# Patient Record
(demographics unavailable — no encounter records)

---

## 2017-01-24 NOTE — CONSULT
Consult Detox [facility]


Reason for Current Admission/Consult: ALCOHOL WITHDRAWAL SX.?


Referred by:: RYAN BULLARD MD





- History


History of Present Illness: 


61 Y/O MAN WITH A LONG HX. OF ALCOHOLISM WAS BROUGHT TO ED BECAUSE OF CONFUSION 

AND AGITATION.


ACCORDING TO MEDICAL RECORD HE WAS ON RESTORIL BUT RAN OUT A FEW DAYS AGO. HE 

RECEIVED VALIUM 10MG 


IM 2X. DURING MY INTERVIEW WITH PT. HE WAS CALMER BUT ANXIOUS,HE INSISTS THAT 

HE HAS NOT BEEN DRINKING 


IN 3 1/2 YRS.





- History Source


History Provided By: Patient, Medical Record


Limitations to Obtaining History: No Limitations





- Alcohol/Substance Use


Hx Alcohol Use: No (X4 YEARS AGO STOPPED)





- Current Drug/Alcohol Use


  ** Alcohol


Route: Oral


Frequency: Daily


Amount used: BEER 4(6PACKS)


Age of first use: 18 (LAST USED 3 1/2 YRS. AGO)





- Past Medical History


Cardio/Vascular: Yes: HTN


Psych: Yes: Addictions





- Significant


Medical Findings: 


 Laboratory Last Values











WBC  9.3 K/mm3 (4.0-10.0)  D 17  22:20    


 


RBC  4.66 M/mm3 (4.00-5.60)   17  22:20    


 


Hgb  14.6 GM/dL (11.7-16.9)  D 17  22:20    


 


Hct  42.3 % (35.4-49)  D 17  22:20    


 


MCV  90.9 fl (80-96)   17  22:20    


 


MCHC  34.4 g/dl (32.0-35.9)   17  22:20    


 


RDW  13.1 % (11.9-15.9)   17  22:20    


 


Plt Count  184 K/MM3 (134-434)  D 17  22:20    


 


MPV  8.4 fl (7.5-11.1)   17  22:20    


 


Neutrophils %  79.5 % (42.8-82.8)  D 17  22:20    


 


Lymphocytes %  12.7 % (8-40)  D 17  22:20    


 


Monocytes %  7.3 % (3.8-10.2)   17  22:20    


 


Eosinophils %  0.1 % (0-4.5)  D 17  22:20    


 


Basophils %  0.4 % (0-2.0)   17  22:20    


 


Sodium  133 mmol/L (136-145)  L  17  22:20    


 


Potassium  4.6 mmol/L (3.5-5.1)   17  22:20    


 


Chloride  96 mmol/L ()  L D 17  22:20    


 


Carbon Dioxide  25 mmol/L (21-32)   17  22:20    


 


Anion Gap  12  (8-16)   17  22:20    


 


BUN  40 mg/dL (7-18)  H D 17  22:20    


 


Creatinine  1.6 mg/dL (0.7-1.3)  H D 17  22:20    


 


Creat Clearance w eGFR  44.31  (>60)   17  22:20    


 


POC Glucometer  311 UNITS (())   17  13:14    


 


Random Glucose  283 mg/dL ()  H D 17  22:20    


 


Calcium  9.4 mg/dL (8.5-10.1)   17  22:20    


 


Total Bilirubin  1.0 mg/dL (0.2-1.0)  D 17  22:20    


 


AST  41 U/L (15-37)  H D 17  22:20    


 


ALT  44 U/L (12-78)  D 17  22:20    


 


Alkaline Phosphatase  138 U/L ()  H D 17  22:20    


 


Ammonia  < 10.0 umol/L (11-32)  L  17  22:20    


 


Total Protein  9.2 g/dl (6.4-8.2)  H D 17  22:20    


 


Albumin  4.7 g/dl (3.4-5.0)  D 17  22:20    


 


Opiates Screen  Negative ng/ml (BHABDK=010)   17  00:05    


 


Methadone Screen  Negative ng/ml (EAFFQN=381)   17  00:05    


 


Barbiturate Screen  Negative ng/ml (EDRIYO=721)   17  00:05    


 


Phencyclidine Screen  Negative ng/ml (CUTOFF=25)   17  00:05    


 


Ur Amphetamines Screen  Negative ng/ml (MMMSFJ=373)   17  00:05    


 


MDMA (Ecstasy) Screen  Negative ng/ml (ERRHYS=067)   17  00:05    


 


Benzodiazepines Screen  Negative ng/ml (DHGNHL=339)   17  00:05    


 


Cocaine Screen  Negative ng/ml (GCGUCC=854)   17  00:05    


 


U Marijuana (THC) Screen  Negative ng/ml (CUTOFF=50)   17  00:05    


 


Alcohol, Quantitative  < 5.0 mg/dl (0-5)   17  22:20    








LABS NOTED





CIWA Score





- CIWA Score


Nausea/Vomitin-No Nausea/No Vomiting


Muscle Tremors: 3


Anxiety: 4-Mod. Anxious/Guarded


Agitation: 3


Paroxysmal Sweats: 3


Orientation: 0-Oriented


Tacttile Disturbances: 0-None


Auditory Disturbances: 0-None


Visual Disturbances: 0-None


Headache: 0-None Present


CIWA-Ar Total Score: 13





Assessment Plan





- Diagnosis


(1) Sedative, hypnotic or anxiolytic dependence with withdrawal, uncomplicated


Status: Acute








- Plan


Plan: 


GIVEN PRESENTING SYMPTOMS AND RESPONSE TO VALIUM, IT IS REASONABLE TO ASSUME 

THAT PT. WITHDRAWAL SX. IS FROM NOT TAKING RESTORIL(TEMAZEPAM) WHICH A 

BENZODIAZEPINE.





- Medication


Detox Regimen/Protocol: Librium

## 2017-01-24 NOTE — HP
Admitting History and Physical





- Admission


Chief Complaint: brought in for agitation


History of Present Illness: 


60 Y M DM, HTN, depresion, HX OF ETOH ABUSE.  HAS NOT had a drink in 3.5 YEARS 

BUT RECENT ADMIT CASTS SOME DOUBT. came to ER after an episode of agitation, 

pacing, decreased sleep. pt on ambien. ran out of it several days ago. denies 

drinking or using any other meds or drugs of abuse. in ed, confused, 

disoriented in time. hypertensive and tachycardic. no fever/ denies head ache. 

tremor of upper extremities, per patient he does not remember anything and was 

told by his son that he was pacing the house and was agitated so cleve  to ER


found to have elevated bun/cr 


started on librium protocol


History Source: Medical Record





- Past Medical History


Cardiovascular: Yes: HTN


Psych: Yes: Addictions





- Smoking History


Smoking history: Never smoked


Have you smoked in the past 12 months: No


Aproximately how many cigarettes per day: 0





- Alcohol/Substance Use


Hx Alcohol Use: No (X4 YEARS AGO STOPPED)





Home Medications





- Allergies


Allergies/Adverse Reactions: 


 Allergies











Allergy/AdvReac Type Severity Reaction Status Date / Time


 


No Known Allergies Allergy   Verified 01/23/17 22:06














- Home Medications


Home Medications: 


Ambulatory Orders





Ambien 2.5 mg PO HS 11/15/16 


Cholecalciferol (Vitamin D3) [Vitamin D3] 50,000 unit PO WEEKLY 11/15/16 


Empagliflozin [Jardiance] 25 mg PO DAILY 11/15/16 


Escitalopram Oxalate [Lexapro -] 10 mg PO DAILY 11/15/16 


Gabapentin 400 mg PO BID 11/15/16 


Linaclotide [Linzess] 145 mcg PO DAILY 11/15/16 


Lisinopril [Zestril] 30 mg PO DAILY 11/15/16 


Nifedipine [Adalat cc] 60 mg PO DAILY 11/15/16 


Oxycodone HCl/Acetaminophen [Percocet 5-325 mg Tablet] 1 tab PO Q4H 11/15/16 


Pantoprazole Sodium 40 mg PO DAILY 11/15/16 


Quetiapine Fumarate [Seroquel] 100 tab DAILY 11/15/16 


Temazepam [Restoril] 30 mg PO DAILY 11/15/16 


Vitamin B Comp W-C [Total B with C] 1 each PO DAILY 11/15/16 











Review of Systems





- Review of Systems


Constitutional: reports: No Symptoms


Eyes: reports: No Symptoms


Cardiovascular: reports: No Symptoms


Respiratory: reports: No Symptoms


Gastrointestinal: reports: No Symptoms





Physical Examination


Vital Signs: 


 Vital Signs











Temperature  98.0 F   01/23/17 22:07


 


Pulse Rate  92 H  01/24/17 10:24


 


Respiratory Rate  18   01/24/17 10:24


 


Blood Pressure  165/103   01/24/17 10:24


 


O2 Sat by Pulse Oximetry (%)  97   01/24/17 10:24














Problem List





- Problems


(1) EMILIE (acute kidney injury)


Assessment/Plan: 


iv hydration


hyponatermia sec to dehydration


Code(s): N17.9 - ACUTE KIDNEY FAILURE, UNSPECIFIED





(2) Alcoholic liver disease


Assessment/Plan: 


ultrasound of liver


Code(s): K70.9 - ALCOHOLIC LIVER DISEASE, UNSPECIFIED





(3) Diabetes


Assessment/Plan: 


bgm


hga1c


contineu meds


Code(s): E11.9 - TYPE 2 DIABETES MELLITUS WITHOUT COMPLICATIONS   Qualifiers: 


     Diabetes mellitus type: type 2





(4) Hypertension


Assessment/Plan: 


adalat and lisinoril


Code(s): I10 - ESSENTIAL (PRIMARY) HYPERTENSION   Qualifiers: 


   





(5) Withdrawal symptoms, alcohol


Assessment/Plan: 


librium protocol


atrivan as needed


Code(s): F10.239 - ALCOHOL DEPENDENCE WITH WITHDRAWAL, UNSPECIFIED   Qualifiers

: 


     Complication of substance-induced condition: uncomplicated     Qualified 

Code(s): F10.230 - Alcohol dependence with withdrawal, uncomplicated  





(6) Depression


Assessment/Plan: 


lexapro,restoril


Code(s): F32.9 - MAJOR DEPRESSIVE DISORDER, SINGLE EPISODE, UNSPECIFIED   





(7) Peripheral neuropathy


Assessment/Plan: 


neurontin


Code(s): G62.9 - POLYNEUROPATHY, UNSPECIFIED   Qualifiers:

## 2017-01-24 NOTE — CONSULT
Admitting History and Physical





- Primary Care Physician


PCP: Roopa Reeder





- Admission


History of Present Illness: 


Per EMR:


"Admission


Chief Complaint: brought in for agitation


History of Present Illness: 


60 Y M DM, HTN, depresion, HX OF ETOH ABUSE.  HAS NOT had a drink in 3.5 YEARS 

BUT RECENT ADMIT CASTS SOME DOUBT. came to ER after an episode of agitation, 

pacing, decreased sleep. pt on ambien. ran out of it several days ago. denies 

drinking or using any other meds or drugs of abuse. in ed, confused, 

disoriented in time. hypertensive and tachycardic. no fever/ denies head ache. 

tremor of upper extremities, per patient he does not remember anything and was 

told by his son that he was pacing the house and was agitated so cleve  to ER


found to have elevated bun/cr 


started on librium protocol"


History Source: Patient, Medical Record


Limitations to Obtaining History: No Limitations





- Past Medical History


Cardiovascular: Yes: HTN


Psych: Yes: Addictions





- Smoking History


Smoking history: Never smoked


Have you smoked in the past 12 months: No


Aproximately how many cigarettes per day: 0





- Alcohol/Substance Use


Hx Alcohol Use: No (X4 YEARS AGO STOPPED)





History





- Admission


Reason For Visit: ETOH WITHDRAWAL SYMPTOMS





- Diagnostics


CT Scan: Report Reviewed





- General


Mental Status: Alert and Oriented, Awake and Alert, Able to Follow Commands


Attention: Intact


Ability to Follow Directions: Good


Head/Neck Control: WFL





- Hearing


Hearing: Normal





Speech Evaluation





- Communication


Primary Language: Turkish


Secondary Language: ENGLISH (ESL)


Communication: Yes: Within Normal Limits, Language Barrier


Oral Expression Ability: Yes: No Impairment





- Speech Production


Able to Make Needs Known: Yes: WNL


Intelligibility: Yes: WNL (ESL)





- Speech Characteristics


Voice Loudness: Normal


Voice Pitch: Yes: Normal


Voice Phonatory-based Quality: Yes: Normal


Speech Pattern: Normal


Nasal Resonance: Normal


Articulation: Yes: Precise


Rate of Speech: Intact





- Language/Auditory Comprehension


Follows: Yes: 1 Stage Simple Commands





- Language/Verbal Expression


Able to Communicate Wants and Needs: Yes: WNL


Functional Communication Status: Yes: WNL


Attention: Yes: Intact





- Swallow Evaluation/Bedside Assessment


Current Nutritional Intake: Regular, Thin Liquids


Oral Secretions: Yes: WFL


Dentition: Yes: Missing Teeth


Facial Symmetry at Rest: Symmetrical


Facial Symmetry on Retraction: Symmetrical


Facial Movement: Controlled


Sensation: Normal


Against Resistance Opening: Normal


Against Resistance Closing: Normal


Pucker Lips: Normal


Smile: Normal


Lingual Movement: Normal


Lingual Speed of Movement: Normal


Lingual Movement Strgth Against Opposition: Normal


Lingual Movement Characteristics: Normal


Velopharyngeal Movement: Normal


Laryngeal Elevation: WFL


Laryngeal Movement: Able to Palpate


Rate of Intake: WFL


Bolus Size: WFL


Labial Seal: WFL


Chewing: WFL


Oral Prep Time: WFL


A-P Transit: WFL


Pocketing: None


Timing of Swallow: WFL


Coughing/Throat Clear: No


Change in Voice: No





Recommendations





- Speech Evaluation, Impression/Plan


Impression: Difficult at times to understand sec to English as a second 

language.  Swallowing overtly intact. Missing dentition





- Dysphagia Impressions/Plan


Swallowing Skills: WFL


Dysphagia Impressions: No Impairment, Ongoing Evaluation


*Silent aspiration: cannot be R/O at bedside





- Recommendations


Diet Consistency: Regular (soft. Missing dentition)


Medication Administration: Whole with water


Liquids: Thin Liquids

## 2017-01-24 NOTE — CONSULT
Consult - text type





- Consultation


Consultation Note: 


NEUROLOGY CONSULTATION is greatly appreciated:





This 59 yo RH man with h/o DM, HTN, GERD, depression and pain has extensive 

ETOH history but claims none in 3 1/2 years.


Meds include: Jardiance, lexapro, gabapentine (400 BID), linzess, lisinopril, 

nifedipine, pantoprazole, oxycodone, temazepam and zolpidem.


Apparently ran out of meds including temazepam, about 1 week ago.


Admitted with 3 days of sleeplessness, tachycardia, agitation, and confusion 

which notes suggest improved after Valium (10 mg IV).





CT of head (reviewed): normal


Labs: Elevated glucose, Cr/BUN





JEFFREY: No head trauma, No bruits, Cor: Reg, afebrile.


NEURO: Mild OMS. Repeats himself. Recalls 1 of 3 at 3, - Frontal release 

findings.


           Speech fluent


           CN's normal without nystagmus.


           Motor: +sustention tremor. No drift. Normal strength. Brisk 

reflexes. Toes downgoing.


           Coord: Minimal FTN dystaxia wilth tremor.


           Sensory:Normal


           Gait: Sl wide-based and shortened strides.





IMP: Non-focal exam sig for mild encephalopathy with tremors.


       Clinical picture c/w ETOH and/or benzodiazepine withdrawal.





SUGGEST: Encourage compliance with Librium detox protocol.


              Check B12, B1, Mg++, TSH


              Keep well-hydrated.


              Give thiamine 250 mg IVSS in 100 cc NS over 1 hour via pump q 8 

hrs x 3 days.


              Mobilize OOB to chair. [place] with TV, radio.





Thank you very much,


Felix Cao MD

## 2017-01-24 NOTE — PDOC
History of Present Illness





- General


History Source: Patient


Exam Limitations: Clinical Condition





- History of Present Illness


Initial Comments: 





01/24/17 00:28


60 Y M DM, HTN, depresion, HX OF ETOH ABUSE. STS HAS NOT DRENK FOR 3 YEARS BUT 

RECENT ADMIT CASTS SOME DOUBT. pte after an episode of agitation, pacing, 

decreased sleep. pt on ambien. ran out of it several days ago. denies drinking 

or using any other meds or drugs of abuse. in ed, confused, disoriented in 

time. hypertensive and tachycardic. no fever/ denies head ache. tremor of upper 

extremities





<Jeffy Henderson - Last Filed: 01/24/17 00:53>





<Sharlene Kaba - Last Filed: 01/24/17 01:46>





- General


Chief Complaint: Blood Pressure Problem


Stated Complaint: AMS


Time Seen by Provider: 01/23/17 22:10





Past History





- Past Medical History


Diabetes: Yes


Dialysis: Yes


HTN: Yes


Liver Disease: Yes


Seizures: Yes





- Immunization History


Immunization Up to Date: Yes





- Psycho/Social/Smoking Cessation Hx


Anxiety: No


Suicidal Ideation: No


Smoking Status: No


Smoking History: Unknown if ever smoked


Have you smoked in the past 12 months: No


Number of Cigarettes Smoked Daily: 0


Cigars Per Day: 0


Information on smoking cessation initiated: No


Hx Alcohol Use: No


Drug/Substance Use Hx: No


Substance Use Type: None


Hx Substance Use Treatment: No





<Jeffy Henderson - Last Filed: 01/24/17 00:53>





<Sharlene Kaba - Last Filed: 01/24/17 01:46>





- Past Medical History


Allergies/Adverse Reactions: 


 Allergies











Allergy/AdvReac Type Severity Reaction Status Date / Time


 


No Known Allergies Allergy   Verified 01/23/17 22:06











Home Medications: 


Ambulatory Orders





Ambien 2.5 mg PO HS 11/15/16 


Cholecalciferol (Vitamin D3) [Vitamin D3] 50,000 unit PO WEEKLY 11/15/16 


Empagliflozin [Jardiance] 25 mg PO DAILY 11/15/16 


Escitalopram Oxalate [Lexapro -] 10 mg PO DAILY 11/15/16 


Gabapentin 400 mg PO BID 11/15/16 


Linaclotide [Linzess] 145 mcg PO DAILY 11/15/16 


Lisinopril [Zestril] 30 mg PO DAILY 11/15/16 


Nifedipine [Adalat cc] 60 mg PO DAILY 11/15/16 


Oxycodone HCl/Acetaminophen [Percocet 5-325 mg Tablet] 1 tab PO Q4H 11/15/16 


Pantoprazole Sodium 40 mg PO DAILY 11/15/16 


Quetiapine Fumarate [Seroquel] 100 tab DAILY 11/15/16 


Temazepam [Restoril] 30 mg PO DAILY 11/15/16 


Vitamin B Comp W-C [Total B with C] 1 each PO DAILY 11/15/16 











**Review of Systems





- Review of Systems


Able to Perform ROS?: No





<Jeffy Henderson - Last Filed: 01/24/17 00:53>





*Physical Exam





- Vital Signs


 Last Vital Signs











Temp Pulse Resp BP Pulse Ox


 


 98.0 F   116 H  20   178/124   100 


 


 01/23/17 22:07  01/23/17 22:07  01/23/17 22:07  01/23/17 22:07  01/23/17 22:07














- Physical Exam


General Appearance: Yes: Nourished, Appropriately Dressed, Moderate Distress


HEENT: positive: Normal ENT Inspection, Other (tongue fasciculation)


Neck: positive: Supple.  negative: Tender


Respiratory/Chest: positive: Lungs Clear, Normal Breath Sounds.  negative: 

Respiratory Distress


Cardiovascular: positive: Regular Rhythm, Regular Rate, Tachycardia


Gastrointestinal/Abdominal: positive: Soft.  negative: Tender


Musculoskeletal: positive: Normal Inspection.  negative: Vertebral Tenderness


Extremity: positive: Normal Capillary Refill, Normal Range of Motion


Integumentary: positive: Normal Color.  negative: Rash


Neurologic: positive: CNs II-XII NML intact, Alert, Motor Strength 5/5, Confused

, Disoriented.  negative: Fully Oriented





<Jeffy Henderson - Last Filed: 01/24/17 00:53>





- Vital Signs


 Last Vital Signs











Temp Pulse Resp BP Pulse Ox


 


 98.0 F   116 H  20   178/124   100 


 


 01/23/17 22:07  01/23/17 22:07  01/23/17 22:07  01/23/17 22:07  01/23/17 22:07














<Sharlene Kaba - Last Filed: 01/24/17 01:46>





ED Treatment Course





- LABORATORY


CBC & Chemistry Diagram: 


 01/23/17 22:20





 01/23/17 22:20





- ADDITIONAL ORDERS


Additional order review: 


 Laboratory  Results











  01/23/17 01/23/17 01/23/17





  22:20 22:20 22:20


 


Sodium    133 L


 


Potassium    4.6


 


Chloride    96 L D


 


Carbon Dioxide    25


 


Anion Gap    12


 


BUN    40 H D


 


Creatinine    1.6 H D


 


Creat Clearance w eGFR    44.31


 


POC Glucometer   


 


Random Glucose    283 H D


 


Calcium    9.4


 


Total Bilirubin    1.0  D


 


AST    41 H D


 


ALT    44  D


 


Alkaline Phosphatase    138 H D


 


Ammonia   < 10.0 L 


 


Total Protein    9.2 H D


 


Albumin    4.7  D


 


Alcohol, Quantitative  < 5.0  














  01/23/17





  22:08


 


Sodium 


 


Potassium 


 


Chloride 


 


Carbon Dioxide 


 


Anion Gap 


 


BUN 


 


Creatinine 


 


Creat Clearance w eGFR 


 


POC Glucometer  283.39554


 


Random Glucose 


 


Calcium 


 


Total Bilirubin 


 


AST 


 


ALT 


 


Alkaline Phosphatase 


 


Ammonia 


 


Total Protein 


 


Albumin 


 


Alcohol, Quantitative 








 











  01/23/17 01/23/17





  22:20 22:08


 


RBC  4.66 


 


MCV  90.9 


 


MCHC  34.4 


 


RDW  13.1 


 


MPV  8.4 


 


Neutrophils %  79.5  D 


 


Lymphocytes %  12.7  D 


 


Monocytes %  7.3 


 


Eosinophils %  0.1  D 


 


Basophils %  0.4 


 


POC Glucometer   283.37474














- Medications


Given in the ED: 


ED Medications














Discontinued Medications














Generic Name Dose Route Start Last Admin





  Trade Name Freq  PRN Reason Stop Dose Admin


 


Diazepam  10 mg 01/23/17 22:23 01/23/17 22:31





  Valium Injection -  IVPUSH 01/23/17 22:24  10 mg





  ONCE ONE   Administration


 


Sodium Chloride  1,000 mls @ 1,000 mls/hr 01/23/17 22:14 01/23/17 22:30





  Normal Saline -  IV 01/23/17 23:13  1,000 mls/hr





  ASDIR STA   Administration














<Jeffy Henderson - Last Filed: 01/24/17 00:53>





- LABORATORY


CBC & Chemistry Diagram: 


 01/23/17 22:20





 01/23/17 22:20





- ADDITIONAL ORDERS


Additional order review: 


 Laboratory  Results











  01/24/17 01/23/17 01/23/17





  00:05 22:20 22:20


 


Sodium   


 


Potassium   


 


Chloride   


 


Carbon Dioxide   


 


Anion Gap   


 


BUN   


 


Creatinine   


 


Creat Clearance w eGFR   


 


POC Glucometer   


 


Random Glucose   


 


Calcium   


 


Total Bilirubin   


 


AST   


 


ALT   


 


Alkaline Phosphatase   


 


Ammonia    < 10.0 L


 


Total Protein   


 


Albumin   


 


Opiates Screen  Negative  


 


Methadone Screen  Negative  


 


Barbiturate Screen  Negative  


 


Phencyclidine Screen  Negative  


 


Ur Amphetamines Screen  Negative  


 


MDMA (Ecstasy) Screen  Negative  


 


Benzodiazepines Screen  Negative  


 


Cocaine Screen  Negative  


 


U Marijuana (THC) Screen  Negative  


 


Alcohol, Quantitative   < 5.0 














  01/23/17 01/23/17





  22:20 22:08


 


Sodium  133 L 


 


Potassium  4.6 


 


Chloride  96 L D 


 


Carbon Dioxide  25 


 


Anion Gap  12 


 


BUN  40 H D 


 


Creatinine  1.6 H D 


 


Creat Clearance w eGFR  44.31 


 


POC Glucometer   283.48148


 


Random Glucose  283 H D 


 


Calcium  9.4 


 


Total Bilirubin  1.0  D 


 


AST  41 H D 


 


ALT  44  D 


 


Alkaline Phosphatase  138 H D 


 


Ammonia  


 


Total Protein  9.2 H D 


 


Albumin  4.7  D 


 


Opiates Screen  


 


Methadone Screen  


 


Barbiturate Screen  


 


Phencyclidine Screen  


 


Ur Amphetamines Screen  


 


MDMA (Ecstasy) Screen  


 


Benzodiazepines Screen  


 


Cocaine Screen  


 


U Marijuana (THC) Screen  


 


Alcohol, Quantitative  








 











  01/23/17 01/23/17





  22:20 22:08


 


RBC  4.66 


 


MCV  90.9 


 


MCHC  34.4 


 


RDW  13.1 


 


MPV  8.4 


 


Neutrophils %  79.5  D 


 


Lymphocytes %  12.7  D 


 


Monocytes %  7.3 


 


Eosinophils %  0.1  D 


 


Basophils %  0.4 


 


POC Glucometer   283.93844














- RADIOLOGY


Radiograph Interpretation: 





01/24/17 01:45


EXAM: CT HEAD WITHOUT CONTRAST


Reviewed by Imaging on Call: 


IMPRESSION: 


No CT evidence of acute infarct, hemorrhage, or mass effect.








- Medications


Given in the ED: 


ED Medications














Discontinued Medications














Generic Name Dose Route Start Last Admin





  Trade Name Freq  PRN Reason Stop Dose Admin


 


Diazepam  10 mg 01/23/17 22:23 01/23/17 22:31





  Valium Injection -  IVPUSH 01/23/17 22:24  10 mg





  ONCE ONE   Administration


 


Diazepam  10 mg 01/24/17 00:00 01/24/17 00:17





  Valium Injection -  IVPUSH 01/24/17 00:01  10 mg





  ONCE ONE   Administration


 


Sodium Chloride  1,000 mls @ 1,000 mls/hr 01/23/17 22:14 01/23/17 22:30





  Normal Saline -  IV 01/23/17 23:13  1,000 mls/hr





  ASDIR STA   Administration














<Sharlene Kaba - Last Filed: 01/24/17 01:46>





Medical Decision Making





- Medical Decision Making





01/24/17 00:35


unclear trigger to his presentaton which suggest etoh/benzo withdrawal


could be explained by acute psychosis +/-ambien withdrawal


will cover w/ benzos/ivf/admit





<Jeffy Henderson - Last Filed: 01/24/17 00:53>





- Medical Decision Making


01/24/17 00:05


Paged Dr. Matteo Wallace covering for Dr. Marsha Hernandez (via answering service) at 

24:05, 24:46


Awaiting call back


Patient's case was discussed with Dr. Wallace at 24:50








<Sharlene Kaba - Last Filed: 01/24/17 01:46>





*DC/Admit/Observation/Transfer





- Discharge Dispostion


Admit: Yes





<Jeffy Henderson - Last Filed: 01/24/17 00:53>





<Sharlene Kaba - Last Filed: 01/24/17 01:46>


Diagnosis at time of Disposition: 


Withdrawal symptoms, alcohol


Qualifiers:


 Complication of substance-induced condition: uncomplicated Qualified Code(s): 

F10.230 - Alcohol dependence with withdrawal, uncomplicated





- Discharge Dispostion


Condition at time of disposition: Guarded





- Referrals


Referrals: 


STAFF,NOT ON [Primary Care Provider] -

## 2017-01-25 NOTE — EKG
Test Reason : 

Blood Pressure : ***/*** mmHG

Vent. Rate : 081 BPM     Atrial Rate : 081 BPM

   P-R Int : 164 ms          QRS Dur : 102 ms

    QT Int : 380 ms       P-R-T Axes : [phone] degrees

   QTc Int : 441 ms

 

NORMAL SINUS RHYTHM

NORMAL ECG

WHEN COMPARED WITH ECG OF 23-JAN-2017 22:09,

NO SIGNIFICANT CHANGE WAS FOUND

Confirmed by HIREN MARY MD ([id number]) on 1/25/2017 3:34:19 PM

 

Referred By: ROEL COOL           Confirmed By:HIREN MARY MD

## 2017-01-26 NOTE — PN
Progress Note, Physician





- Current Medication List


Current Medications: 


Active Medications





Acetaminophen (Tylenol -)  650 mg PO Q6H PRN


   PRN Reason: FEVER OR PAIN


Chlordiazepoxide HCl (Librium -)  25 mg PO Q4H PRN


   PRN Reason: WITHDRAWAL(CONT SUBST)


   Stop: 01/27/17 14:49


Chlordiazepoxide HCl (Librium -)  25 mg PO [id number] [facility]


   Stop: 01/26/17 11:01


   Last Admin: 01/26/17 05:23 Dose:  25 mg


Chlordiazepoxide HCl (Librium -)  15 mg PO [id number] [facility]


   Stop: 01/27/17 11:01


Escitalopram Oxalate (Lexapro -)  10 mg PO DAILY [facility]


   Last Admin: 01/25/17 10:28 Dose:  10 mg


Gabapentin (Neurontin -)  100 mg PO TID [facility]


   Last Admin: 01/26/17 05:23 Dose:  100 mg


Sodium Chloride (Normal Saline -)  1,000 mls @ 75 mls/hr IV ASDIR [facility]


   Last Admin: 01/25/17 11:24 Dose:  Not Given


Insulin Aspart (Novolog Vial Sliding Scale -)  1 vial SQ ACHS [facility]


   PRN Reason: Protocol


   Last Admin: 01/26/17 06:38 Dose:  Not Given


Insulin Detemir (Levemir Vial)  30 units SQ AM [facility]


   Last Admin: 01/26/17 07:41 Dose:  30 units


Lisinopril (Prinivil)  5 mg PO DAILY [facility]


   Last Admin: 01/25/17 10:28 Dose:  5 mg


Lorazepam (Ativan Injection -)  1 mg IM Q6H PRN


   PRN Reason: ANXIETY


Nifedipine (Procardia Xl -)  60 mg PO DAILY [facility]


   Last Admin: 01/25/17 10:32 Dose:  60 mg


Pantoprazole Sodium (Protonix -)  40 mg PO DAILY [facility]


   Last Admin: 01/25/17 10:28 Dose:  40 mg


Quetiapine Fumarate (Seroquel -)  100 mg PO [facility]


   Last Admin: 01/25/17 21:28 Dose:  100 mg


Sitagliptin Phosphate (Januvia -)  50 mg PO DAILY@0700 [facility]


   Last Admin: 01/25/17 06:05 Dose:  50 mg


Temazepam (Restoril -)  15 mg PO HS [facility]


   Last Admin: 01/25/17 21:29 Dose:  15 mg


Thiamine HCl (Vitamin B1 Injection -)  200 mg IVPB DAILY [facility]


   Last Admin: 01/25/17 10:32 Dose:  200 mg











- Objective


Vital Signs: 


 Vital Signs











Temperature  97.7 F   01/26/17 06:00


 


Pulse Rate  82   01/26/17 06:00


 


Respiratory Rate  18   01/26/17 06:00


 


Blood Pressure  143/88   01/26/17 06:00


 


O2 Sat by Pulse Oximetry (%)  97   01/25/17 22:00











Cardiovascular: Yes: Regular Rate and Rhythm


Respiratory: Yes: Regular, CTA Bilaterally


Gastrointestinal: Yes: Normal Bowel Sounds, Soft


Labs: 


 CBC, BMP





 01/26/17 05:35 











Assessment/Plan


- Problems


(1) EMILIE (acute kidney injury)


Assessment/Plan: 


iv hydration


hyponatermia sec to dehydration


us


Code(s): N17.9 - ACUTE KIDNEY FAILURE, UNSPECIFIED





(2) Alcoholic liver disease


Assessment/Plan: 


ultrasound of liver


Code(s): K70.9 - ALCOHOLIC LIVER DISEASE, UNSPECIFIED





(3) Diabetes


Assessment/Plan: 


bgm


hga1c


continue meds


endo


Code(s): E11.9 - TYPE 2 DIABETES MELLITUS WITHOUT COMPLICATIONS   Qualifiers: 


     Diabetes mellitus type: type 2





(4) Hypertension


Assessment/Plan: 


adalat and lisinoril


Code(s): I10 - ESSENTIAL (PRIMARY) HYPERTENSION   Qualifiers: 


   





(5) Withdrawal symptoms, alcohol


Assessment/Plan: 


librium protocol


Ativan as needed


detox and neuro consults noted


Code(s): F10.239 - ALCOHOL DEPENDENCE WITH WITHDRAWAL, UNSPECIFIED   Qualifiers

: 


     Complication of substance-induced condition: uncomplicated     Qualified 

Code(s): F10.230 - Alcohol dependence with withdrawal, uncomplicated  





(6) Depression


Assessment/Plan: 


lexapro,restoril


Code(s): F32.9 - MAJOR DEPRESSIVE DISORDER, SINGLE EPISODE, UNSPECIFIED   





(7) Peripheral neuropathy


Assessment/Plan: 


neurontin


Code(s): G62.9 - POLYNEUROPATHY, UNSPECIFIED   Qualifiers: 


   


(8) Torn Chordae --on echo


cardio

## 2017-01-26 NOTE — EKG
Test Reason : 

Blood Pressure : ***/*** mmHG

Vent. Rate : 116 BPM     Atrial Rate : 116 BPM

   P-R Int : 158 ms          QRS Dur : 090 ms

    QT Int : 334 ms       P-R-T Axes : 065 037 053 degrees

   QTc Int : 464 ms

 

SINUS TACHYCARDIA

POSSIBLE LEFT ATRIAL ENLARGEMENT

BORDERLINE ECG

WHEN COMPARED WITH ECG OF 15-NOV-2016 16:13,

NONSPECIFIC T WAVE ABNORMALITY HAS REPLACED INVERTED T WAVES IN 

INFERIOR LEADS

Confirmed by RADHA RAMIREZ, DAGMAR ([id number]) on 1/26/2017 2:23:02 PM

 

Referred By:             Confirmed By:DAGMAR GARCIA MD

## 2017-01-26 NOTE — CONSULT
Consult


Consult Specialty:: Cardiology


Referred by:: Dr Hernandez


Reason for Consultation:: Abnormal echocardiogram





- History of Present Illness


Chief Complaint: Agitation, insomnia, confusion


History of Present Illness: 


61 yo male, son-smoker, with hx of DM 2, HTN, depresion, insomnia, past alcohol 

abuse -. last drank 3 1/1 yo, here with episode of agitation, confusion , 

insomnia


Patient has been on ambien and says he ran out 3 days ago -. had had been 

taking extra pills sometimes.


He was place don withdrawal protocol (? etoh vs benzo withdrawal) -. seen by 

neuro who thought him to be mildly encephalopathic





Patient has no cardiac history -. he denies hx of MI, CHF or CP syndrome.  he 

walks an hour daily an dtakes stairs in his house daily, w/o limitations.


He has no HOOD, PND or orthopnea.





He had an echo -. read as redundant or torn chordae; hence this consultation





- History Source


History Provided By: Patient





- Past Medical History


Cardio/Vascular: Yes: HTN


Psych: Yes: Addictions


Endocrine: Yes: Diabetes Mellitus


Additional Medical History: falls -> neck pain





- Alcohol/Substance Use


Hx Alcohol Use: Yes (X4 YEARS AGO STOPPED)





- Smoking History


Smoking history: Never smoked


Have you smoked in the past 12 months: No


Aproximately how many cigarettes per day: 0





- Social History


Usual Living Arrangement: With Spouse (lives with wife in apartment in house 

with 20-24 steps to enter; previously independent in ADLs, ambulated without 

Assistive device)


Place of Birth: [place] ([place])





Home Medications





- Allergies


Allergies/Adverse Reactions: 


 Allergies











Allergy/AdvReac Type Severity Reaction Status Date / Time


 


No Known Allergies Allergy   Verified 01/23/17 22:06














- Home Medications


Home Medications: 


Ambulatory Orders





Ambien 2.5 mg PO HS 11/15/16 


Cholecalciferol (Vitamin D3) [Vitamin D3] 50,000 unit PO WEEKLY 11/15/16 


Empagliflozin [Jardiance] 25 mg PO DAILY 11/15/16 


Escitalopram Oxalate [Lexapro -] 10 mg PO DAILY 11/15/16 


Gabapentin 400 mg PO BID 11/15/16 


Linaclotide [Linzess] 145 mcg PO DAILY 11/15/16 


Lisinopril [Zestril] 30 mg PO DAILY 11/15/16 


Nifedipine [Adalat cc] 60 mg PO DAILY 11/15/16 


Oxycodone HCl/Acetaminophen [Percocet 5-325 mg Tablet] 1 tab PO Q4H 11/15/16 


Pantoprazole Sodium 40 mg PO DAILY 11/15/16 


Quetiapine Fumarate [Seroquel] 100 tab DAILY 11/15/16 


Temazepam [Restoril] 30 mg PO DAILY 11/15/16 


Vitamin B Comp W-C [Total B with C] 1 each PO DAILY 11/15/16 


Insulin (Levemir) [Levemir Vial] 100 units ACBK 01/24/17 











Family Disease History





- Family Disease History


Family History: Denies (premature CAD)





Review of Systems





- Review of Systems


Constitutional: reports: No Symptoms


Eyes: reports: No Symptoms


HENT: reports: No Symptoms


Neck: reports: Other (neck pain)


Cardiovascular: reports: No Symptoms


Respiratory: reports: No Symptoms


Gastrointestinal: reports: No Symptoms


Genitourinary: reports: No Symptoms


Musculoskeletal: reports: Back Pain


Neurological: reports: Tremors (mild)


Psychiatric: reports: Depression





Physical Exam


Vital Signs: 


 Vital Signs











Temperature  97.9 F   01/26/17 10:00


 


Pulse Rate  80   01/26/17 10:00


 


Respiratory Rate  18   01/26/17 10:00


 


Blood Pressure  135/83   01/26/17 10:00


 


O2 Sat by Pulse Oximetry (%)  97   01/26/17 10:00











Constitutional: Yes: No Distress


Eyes: Yes: Conjunctiva Clear


HENT: Yes: Atraumatic


Neck: Yes: Supple


Cardiovascular: Yes: Regular Rate and Rhythm.  No: Murmur


Respiratory: Yes: CTA Bilaterally


Gastrointestinal: Yes: Normal Bowel Sounds, Soft.  No: Tenderness


Extremities: Yes: Other (warm)


Edema: No


Peripheral Pulses WNL: Yes


Neurological: Yes: Alert, Oriented, Tremors (mild)


Psychiatric: Yes: Alert, Oriented


Labs: 


 CBC, BMP





 01/26/17 05:35 











Imaging





- Results


EKG: Report Reviewed, Image Reviewed (SR)


Other: Other (tele -> SR, artifacts  Echo -. Normal LV size and systolic 

function, mild DD, mild concentric LVH, Torn or redundant chordae with trace MR

, PPM in RV)





Assessment/Plan


61 yo male with the above history, here with ? etoh vs benzo withdrawal -. on 

precaution, found with mild encephalopathy


He is more alert now, oer nursing


He denies any cardiac history and has no cardiac complaints





He was mildly tachycardic on admission (ST) -. c/w presentation





Echo read as torn or redundant chordae, but only with trace MR -. no 

intervention or further evaluation warranted at this time.  He will need follow-

up however.


Also pacemaker leads reported-. likely artifact, as pt has no PPM





Rec:


No further cardiac evaluation


I will review echo and have report amended as necessary


Continue DM and BP meds


Check lipids if no recent ones -. should be on statin given DM, if no 

contraindications (LFTs normal now, though mildly elevated on admission)


May d/c tele -> HR has been controlled


Per IM/neuro





Thanks! Will see prn!

## 2017-01-27 NOTE — CONSULT
Consult


Consult Specialty:: endocrine/diabetes mellitus


Referred by:: dr.saba khoury


Reason for Consultation:: uncontrolled diabetes mellitus





- History of Present Illness


Chief Complaint: confused and restless


History of Present Illness: 


60 Y M DM, HTN, depresion, HX OF ETOH ABUSE.  HAS NOT had a drink in 3.5 YEARS 

BUT RECENT ADMIT CASTS SOME DOUBT. came to ER after an episode of agitation, 

pacing, decreased sleep. pt on ambien. ran out of it several days ago. denies 

drinking or using any other meds or drugs of abuse. in ed, confused, has 

elevated bs for past several days,unsure of taking insulin and doses,has 

episode sugar in 300mg/dl from poor diet and restriction incarbs,no 

hypoglycemia in past several months.





- History Source


History Provided By: Patient





- Past Medical History


Cardio/Vascular: Yes: HTN


Psych: Yes: Addictions


Endocrine: Yes: Diabetes Mellitus


Additional Medical History: falls -> neck pain





- Alcohol/Substance Use


Hx Alcohol Use: Yes (X4 YEARS AGO STOPPED)





- Smoking History


Smoking history: Never smoked


Have you smoked in the past 12 months: No


Aproximately how many cigarettes per day: 0





- Social History


Usual Living Arrangement: With Spouse (lives with wife in apartment in house 

with 20-24 steps to enter; previously independent in ADLs, ambulated without 

Assistive device)





Home Medications





- Allergies


Allergies/Adverse Reactions: 


 Allergies











Allergy/AdvReac Type Severity Reaction Status Date / Time


 


No Known Allergies Allergy   Verified 01/23/17 22:06














- Home Medications


Home Medications: 


Ambulatory Orders





Cholecalciferol (Vitamin D3) [Vitamin D3] 50,000 unit PO WEEKLY 11/15/16 


Empagliflozin [Jardiance] 25 mg PO DAILY 11/15/16 


Escitalopram Oxalate [Lexapro -] 10 mg PO DAILY 11/15/16 


Linaclotide [Linzess] 145 mcg PO DAILY 11/15/16 


Nifedipine [Adalat cc] 60 mg PO DAILY 11/15/16 


Pantoprazole Sodium 40 mg PO DAILY 11/15/16 


Quetiapine Fumarate [Seroquel] 100 tab DAILY 11/15/16 


Vitamin B Comp W-C [Total B with C -] 1 each PO DAILY 11/15/16 


Gabapentin [Neurontin -] 100 mg PO TID #90 capsule 01/27/17 


Insulin (Levemir) [Levemir Vial] 35 units SQ AM  ml 01/27/17 


Lisinopril [Prinivil] 5 mg PO DAILY #30 tablet 01/27/17 


Temazepam [Restoril -] 15 mg PO HS  capsule MDD 1 01/27/17 











Review of Systems





- Review of Systems


Constitutional: reports: Loss of Appetite


Eyes: reports: Recent Change in Vision


HENT: reports: No Symptoms


Neck: reports: No Symptoms


Cardiovascular: reports: No Symptoms


Respiratory: reports: No Symptoms


Gastrointestinal: reports: Bloating, Constipation


Genitourinary: reports: No Symptoms


Breasts: reports: No Symptoms Reported


Musculoskeletal: reports: Muscle Cramps, Muscle Weakness


Integumentary: reports: No Symptoms


Neurological: reports: Tremors, Unsteady Gait, Weakness





Physical Exam


Vital Signs: 


 Vital Signs











Temperature  97.8 F   01/27/17 16:25


 


Pulse Rate  69   01/27/17 16:25


 


Respiratory Rate  20   01/27/17 16:25


 


Blood Pressure  120/69   01/27/17 16:25


 


O2 Sat by Pulse Oximetry (%)  98   01/27/17 09:00











Constitutional: Yes: Anxious


Eyes: Yes: EOM Intact


HENT: Yes: Normocephalic


Neck: Yes: Trachea Midline


Cardiovascular: Yes: Regular Rate and Rhythm


Respiratory: Yes: CTA Bilaterally


Gastrointestinal: Yes: Normal Bowel Sounds


...Rectal Exam: Yes: Deferred


Renal/: Yes: WNL


Breast(s): Yes: WNL


Musculoskeletal: Yes: WNL


Extremities: Yes: WNL


Edema: No


Peripheral Pulses WNL: Yes


Neurological: Yes: WNL, Alert, Oriented


Labs: 


 CBC, BMP





 01/26/17 05:35 











Problem List





- Problems


(1) Alcoholic liver disease


Code(s): K70.9 - ALCOHOLIC LIVER DISEASE, UNSPECIFIED





(2) Diabetes


Code(s): E11.9 - TYPE 2 DIABETES MELLITUS WITHOUT COMPLICATIONS   Qualifiers: 


     Diabetes mellitus type: type 2





(3) Hypertension


Code(s): I10 - ESSENTIAL (PRIMARY) HYPERTENSION   Qualifiers: 


   





(4) Alcohol dependence with uncomplicated withdrawal


Code(s): F10.230 - ALCOHOL DEPENDENCE WITH WITHDRAWAL, UNCOMPLICATED








Assessment/Plan


Current Active Problems





Alcoholic liver disease (Acute) 


Diabetes (Acute) 


Hypertension (Acute) 


Syncope and collapse (Acute) 





 Laboratory Results - last 24 hr











  01/27/17 01/27/17 01/27/17





  06:30 13:18 17:58


 


POC Glucometer  196  272  260








diabetes mellitus uncontrolle\diabetic neuropathy


alcoholic sequella


ckd\


 Laboratory Tests











  01/25/17 01/26/17 01/26/17





  05:35 05:35 11:34


 


Sodium   138 


 


Potassium   4.1 


 


Chloride   104 


 


Carbon Dioxide   23 


 


Anion Gap   11 


 


POC Glucometer    286


 


Random Glucose   244 H D 


 


Hemoglobin A1c %  8.8 H D  








plan:


levemir 35 units am will need follow up as outpatient for titration insulin dose

\


continue novlog coverage achs scale

## 2017-01-27 NOTE — DS
Physical Examination


Vital Signs: 


 Vital Signs











Temperature  97.6 F   01/27/17 06:00


 


Pulse Rate  69   01/27/17 06:00


 


Respiratory Rate  20   01/27/17 06:00


 


Blood Pressure  112/65   01/27/17 06:00


 


O2 Sat by Pulse Oximetry (%)  97   01/26/17 22:00











Neck: Yes: Supple


Cardiovascular: Yes: Regular Rate and Rhythm, Murmur


Respiratory: Yes: Regular, CTA Bilaterally


Edema: No


Neurological: Yes: Alert, Oriented


Labs: 


 CBC, BMP





 01/26/17 05:35 











Discharge Summary


Reason For Visit: ETOH WITHDRAWAL SYMPTOMS


Current Active Problems





Alcohol dependence with uncomplicated withdrawal (Acute) 


Alcoholic liver disease (Acute) 


Diabetes (Acute) 


Hypertension (Acute) 


Sedative, hypnotic or anxiolytic dependence with withdrawal, uncomplicated (

Acute) 


Syncope and collapse (Acute) 


Withdrawal symptoms, alcohol (Acute) 








Hospital Course: 


60 Y M DM, HTN, depresion, HX OF ETOH ABUSE.  HAS NOT had a drink in 3.5 YEARS 

BUT RECENT ADMIT CASTS SOME DOUBT. came to ER after an episode of agitation, 

pacing, decreased sleep. pt on ambien. ran out of it several days ago. denies 

drinking or using any other meds or drugs of abuse. in ed, confused, 

disoriented in time. hypertensive and tachycardic. no fever/ denies head ache. 

tremor of upper extremities, per patient he does not remember anything and was 

told by his son that he was pacing the house and was agitated so cleve  to ER


found to have elevated bun/cr 


started on librium protocol


History Source: Medical Record





- Past Medical History


Cardiovascular: Yes: HTN


Psych: Yes: Addictions





- Smoking History


Smoking history: Never smoked


Have you smoked in the past 12 months: No


Aproximately how many cigarettes per day: 0





- Alcohol/Substance Use


Hx Alcohol Use: No (X4 YEARS AGO STOPPED)





- Problems


(1) EMILIE (acute kidney injury)


Assessment/Plan: 


IMPROVED


 Laboratory Tests











  01/25/17 01/26/17





  05:35 05:35


 


BUN  42 H  36 H


 


Creatinine  1.5 H  1.3











Code(s): N17.9 - ACUTE KIDNEY FAILURE, UNSPECIFIED





(2) Alcoholic liver disease


Assessment/Plan: 


ultrasound of liver


Code(s): K70.9 - ALCOHOLIC LIVER DISEASE, UNSPECIFIED





(3) Diabetes


Assessment/Plan: 


bgm


 Laboratory Tests











  01/26/17 01/26/17 01/26/17





  11:34 16:48 22:35


 


POC Glucometer  286  298  259














  01/27/17





  06:30


 


POC Glucometer  196








INCREASE LEVEMIR TO 35


hga1c


continue meds


endo


Code(s): E11.9 - TYPE 2 DIABETES MELLITUS WITHOUT COMPLICATIONS   Qualifiers: 


     Diabetes mellitus type: type 2





(4) Hypertension


Assessment/Plan: 


adalat and lisinoril


Code(s): I10 - ESSENTIAL (PRIMARY) HYPERTENSION   Qualifiers: 


   





(5) Withdrawal symptoms, alcohol


Assessment/Plan: 


librium protocol FINISHED


detox and neuro consults noted


Code(s): F10.239 - ALCOHOL DEPENDENCE WITH WITHDRAWAL, UNSPECIFIED   Qualifiers

: 


     Complication of substance-induced condition: uncomplicated     Qualified 

Code(s): F10.230 - Alcohol dependence with withdrawal, uncomplicated  





(6) Depression


Assessment/Plan: 


lexapro,restoril


Code(s): F32.9 - MAJOR DEPRESSIVE DISORDER, SINGLE EPISODE, UNSPECIFIED   





(7) Peripheral neuropathy


Assessment/Plan: 


neurontin


Code(s): G62.9 - POLYNEUROPATHY, UNSPECIFIED   Qualifiers: 


   


(8) Torn Chordae --on echo


cardio noted--outpatient follow up


Condition: Improved





- Instructions


Diet, Activity, Other Instructions: 








follow up with dr vang--cardiology follow up


Referrals: 


STAFF,NOT ON [Primary Care Provider] - 


Milena Vang MD [Staff Physician] - 


Disposition: HOME





- Home Medications


Comprehensive Discharge Medication List: 


Ambulatory Orders





Cholecalciferol (Vitamin D3) [Vitamin D3] 50,000 unit PO WEEKLY 11/15/16 


Empagliflozin [Jardiance] 25 mg PO DAILY 11/15/16 


Escitalopram Oxalate [Lexapro -] 10 mg PO DAILY 11/15/16 


Linaclotide [Linzess] 145 mcg PO DAILY 11/15/16 


Nifedipine [Adalat cc] 60 mg PO DAILY 11/15/16 


Pantoprazole Sodium 40 mg PO DAILY 11/15/16 


Quetiapine Fumarate [Seroquel] 100 tab DAILY 11/15/16 


Vitamin B Comp W-C [Total B with C -] 1 each PO DAILY 11/15/16 


Gabapentin [Neurontin -] 100 mg PO TID #90 capsule 01/27/17 


Insulin (Levemir) [Levemir Vial] 35 units SQ AM  ml 01/27/17 


Lisinopril [Prinivil] 5 mg PO DAILY #30 tablet 01/27/17 


Temazepam [Restoril -] 15 mg PO HS  capsule MDD 1 01/27/17

## 2017-02-04 NOTE — PDOC
History of Present Illness





- General


Chief Complaint: Urinary Problem


Stated Complaint: UNABLE TO PASS URINE


Time Seen by Provider: 02/04/17 15:34


History Source: Patient


Exam Limitations: No Limitations





- History of Present Illness


Travel History: No


Initial Comments: 


02/04/17 16:18


60-year-old male presents to the emergency department with complaints of 

urinary retention and difficulty over the past 3 days only dribbling when he 

tries to go. Patient denies hematuria, fever or chills but does complain of 

suprapubic pressure presently. Patient states history of BPH and is followed by 

Dr. Jose Manuel Tong . patient also states has not had a good bowel movement in 6 

days and complaining of generalized abdominal distention without decreased 

flatulence, nausea, or change in appetite patient states has had constipation 

before and has not taken anything for this episode.   





Timing/Duration: reports: constant, getting worse


Quality: reports: moderate, cramping


Abdominal Pain Onset Location: reports: suprapubic, generalized abdomen


Aggravating Factors: improves with: None


Alleviating Factors: improves with: None





Past History





- Past Medical History


Allergies/Adverse Reactions: 


 Allergies











Allergy/AdvReac Type Severity Reaction Status Date / Time


 


No Known Allergies Allergy   Verified 02/04/17 15:28











Home Medications: 


Ambulatory Orders





Cholecalciferol (Vitamin D3) [Vitamin D3] 50,000 unit PO WEEKLY 11/15/16 


Empagliflozin [Jardiance] 25 mg PO DAILY 11/15/16 


Escitalopram Oxalate [Lexapro -] 10 mg PO DAILY 11/15/16 


Linaclotide [Linzess] 145 mcg PO DAILY 11/15/16 


Nifedipine [Adalat cc] 60 mg PO DAILY 11/15/16 


Pantoprazole Sodium 40 mg PO DAILY 11/15/16 


Quetiapine Fumarate [Seroquel] 100 tab DAILY 11/15/16 


Vitamin B Comp W-C [Total B with C -] 1 each PO DAILY 11/15/16 


Gabapentin [Neurontin -] 100 mg PO TID #90 capsule 01/27/17 


Insulin (Levemir) [Levemir Vial] 35 units SQ AM  ml 01/27/17 


Lisinopril [Prinivil] 5 mg PO DAILY #30 tablet 01/27/17 


Temazepam [Restoril -] 15 mg PO HS  capsule MDD 1 01/27/17 


Polyethylene Glycol 3350 [Miralax (For Bowel Prep) -] 17 gm PO ONCE #1 bottle 02 /04/17 








Diabetes: Yes


HTN: Yes


Liver Disease: Yes


Psychiatric Problems: Yes


Seizures: Yes





- Immunization History


Immunization Up to Date: Yes





- Psycho/Social/Smoking Cessation Hx


Anxiety: No


Suicidal Ideation: No


Smoking Status: No


Smoking History: Never smoked


Have you smoked in the past 12 months: No


Number of Cigarettes Smoked Daily: 0


Cigars Per Day: 0


Hx Alcohol Use: Yes (IN RECOVERY)


Drug/Substance Use Hx: No


Substance Use Type: None


Hx Substance Use Treatment: No


Patient Lives Alone: No


Lives with/in: spouse/SO





**Review of Systems





- Review of Systems


Able to Perform ROS?: Yes


Constitutional: No: Symptoms Reported


HEENTM: No: Symptoms Reported


Respiratory: No: Symptoms reported


Cardiac (ROS): No: Symptoms Reported


ABD/GI: Yes: Constipated, Abdominal cramping


: Yes: See HPI


Musculoskeletal: No: Symptoms Reported


Integumentary: No: Symptoms Reported


Neurological: No: Symptoms reported


Endocrine: No: Symptoms Reported


Hematologic/Lymphatic: No: Symptoms Reported





*Physical Exam





- Vital Signs


 Last Vital Signs











Temp Pulse Resp BP Pulse Ox


 


 97.6 F   100 H  20   102/69   100 


 


 02/04/17 15:22  02/04/17 15:22  02/04/17 15:22  02/04/17 15:22  02/04/17 15:22














- Physical Exam


General Appearance: Yes: Nourished, Appropriately Dressed.  No: Apparent 

Distress


HEENT: negative: Pale Conjunctivae


Neck: positive: Supple


Respiratory/Chest: positive: Lungs Clear, Normal Breath Sounds, Respiratory 

Distress.  negative: Accessory Muscle Use


Cardiovascular: positive: Regular Rhythm, Regular Rate.  negative: Murmur


Gastrointestinal/Abdominal: positive: Normal Bowel Sounds, Soft, Tenderness (

generalized wiuprapubic distention).  negative: Guarding, Rebound


Male Genitalia: positive: normal genitalia


Musculoskeletal: negative: CVA Tenderness


Extremity: positive: Normal Capillary Refill.  negative: Pedal Edema


Integumentary: positive: Normal Color, Dry, Warm


Neurologic: positive: Motor Strength 5/5 (ambulatory)





ED Treatment Course





- LABORATORY


CBC & Chemistry Diagram: 


 02/04/17 15:54





 02/04/17 15:54





- RADIOLOGY


Radiology Studies Ordered: 














 Category Date Time Status


 


 KUB (KID UR & BLAD) [RAD] Stat Radiology  02/04/17 15:35 Ordered














Medical Decision Making





- Medical Decision Making


02/04/17 16:21


Patient with history of end-stage renal disease presents with urinary dribbling 

for the past 3 days associated now with constipation for the past 6 days. 

Patient has no other complaints at this time including fever, nausea or 

weakness.


Patient on exam did have suprapubic distention.


Velez catheter placed without difficulty and drained immediately 700 mL of 

clear yellow urine. Patient ordered for labs including a CBC, comp, urinalysis 

urine culture and ordered for a KUB to assess for abdominal obstruction or 

colonic dilatation.





02/04/17 17:14


 Laboratory Tests











  02/04/17 02/04/17 02/04/17





  15:54 15:54 15:54


 


WBC  6.1  D  


 


Hgb  12.7  D  


 


Hct  37.6  


 


Neutrophils %  57.8  D  


 


Sodium    139


 


Potassium    4.1


 


Chloride    103


 


Carbon Dioxide    26


 


Anion Gap    10


 


BUN    15  D


 


Creatinine    1.3


 


Creat Clearance w eGFR    56.31


 


Calcium    9.1


 


AST    19  D


 


ALT    29  D


 


Alkaline Phosphatase    133 H


 


Urine Glucose (UA)   3+ H 


 


Urine Nitrite   Negative 


 


Ur Leukocyte Esterase   Negative 














02/04/17 17:20


KUB shows retained stool throughout the colon without signs of obstruction with 

diffuse gas pattern predominantly in the sigmoid region. Patient ordered for 

MiraLAX and will be discharged home with a velez to leg bag. Pt to follow up 

with his urologist on Monday. Case also discussed with Dr. Jose Manuel Tong upon 

patient's arrival to the ED and agrees if labs are normal, patient may follow-

up. Patient with an additional 300 mL of clear yellow urine in the canister.





*DC/Admit/Observation/Transfer


Diagnosis at time of Disposition: 


 Urinary retention due to benign prostatic hyperplasia, Encounter for ureteral 

catheter placement





Constipation


Qualifiers:


 Constipation type: other constipation type Qualified Code(s): K59.09 - Other 

constipation





- Discharge Dispostion


Disposition: HOME


Condition at time of disposition: Improved





- Prescriptions


Prescriptions: 


Polyethylene Glycol 3350 [Miralax (For Bowel Prep) -] 17 gm PO ONCE #1 bottle





- Referrals


Referrals: 


Milena Vang MD [Primary Care Provider] - 


Jose Manuel Tong MD [Staff Physician] - 





- Patient Instructions


Printed Discharge Instructions:  DI for Constipation, DI for Urinary Retention 

in Men, How to Care for Your Velez Catheter -- Male


Additional Instructions: 


Please take MiraLAX as prescribed and may repeat the dose in one hour if no 

bowel movement is produced.


Please keep Velez catheter bag empty to avoid overfilling and follow-up with 

urologist on Monday. 


Return to ED if symptoms worsen

## 2017-02-04 NOTE — PDOC
[id number] TO PASS URINE


Time Seen by Provider: 02/04/17 15:34


History Source: Patient, Old Records


Exam Limitations: No Limitations





Past History





- Past Medical History


Allergies/Adverse Reactions: 


 Allergies











Allergy/AdvReac Type Severity Reaction Status Date / Time


 


No Known Allergies Allergy   Verified 02/04/17 15:28











Home Medications: 


Ambulatory Orders





Cholecalciferol (Vitamin D3) [Vitamin D3] 50,000 unit PO WEEKLY 11/15/16 


Empagliflozin [Jardiance] 25 mg PO DAILY 11/15/16 


Escitalopram Oxalate [Lexapro -] 10 mg PO DAILY 11/15/16 


Linaclotide [Linzess] 145 mcg PO DAILY 11/15/16 


Nifedipine [Adalat cc] 60 mg PO DAILY 11/15/16 


Pantoprazole Sodium 40 mg PO DAILY 11/15/16 


Quetiapine Fumarate [Seroquel] 100 tab DAILY 11/15/16 


Vitamin B Comp W-C [Total B with C -] 1 each PO DAILY 11/15/16 


Gabapentin [Neurontin -] 100 mg PO TID #90 capsule 01/27/17 


Insulin (Levemir) [Levemir Vial] 35 units SQ AM  ml 01/27/17 


Lisinopril [Prinivil] 5 mg PO DAILY #30 tablet 01/27/17 


Temazepam [Restoril -] 15 mg PO HS  capsule MDD 1 01/27/17 


Polyethylene Glycol 3350 [Miralax (For Bowel Prep) -] 17 gm PO ONCE #1 bottle 02 /04/17 








Diabetes: Yes


Dialysis: Yes


HTN: Yes


Liver Disease: Yes


Seizures: Yes





- Immunization History


Immunization Up to Date: Yes





- Psycho/Social/Smoking Cessation Hx


Anxiety: No


Suicidal Ideation: No


Smoking Status: No


Smoking History: Never smoked


Have you smoked in the past 12 months: No


Number of Cigarettes Smoked Daily: 0


Cigars Per Day: 0


Hx Alcohol Use: Yes (IN RECOVERY)


Drug/Substance Use Hx: No


Substance Use Type: None


Hx Substance Use Treatment: No





*Physical Exam





- Vital Signs


 Last Vital Signs











Temp Pulse Resp BP Pulse Ox


 


 97.6 F   100 H  20   102/69   100 


 


 02/04/17 15:22  02/04/17 15:22  02/04/17 15:22  02/04/17 15:22  02/04/17 15:22














ED Treatment Course





- LABORATORY


CBC & Chemistry Diagram: 


 02/04/17 15:54





 02/04/17 15:54





*DC/Admit/Observation/Transfer


Diagnosis at time of Disposition: 


 Urinary retention due to benign prostatic hyperplasia, Encounter for ureteral 

catheter placement





Constipation


Qualifiers:


 Constipation type: other constipation type Qualified Code(s): K59.09 - Other 

constipation





- Discharge Dispostion


Disposition: HOME


Condition at time of disposition: Improved





- Prescriptions


Prescriptions: 


Polyethylene Glycol 3350 [Miralax (For Bowel Prep) -] 17 gm PO ONCE #1 bottle





- Referrals


Referrals: 


Milena Vang MD [Primary Care Provider] - 


Jose Manuel Tong MD [Staff Physician] - 





- Patient Instructions


Printed Discharge Instructions:  How to Care for Your Hanley Catheter -- Male, 

DI for Constipation, DI for Urinary Retention in Men


Additional Instructions: 


Please take MiraLAX as prescribed and may repeat the dose in one hour if no 

bowel movement is produced.


Please keep Hanley catheter bag empty to avoid overfilling and follow-up with 

urologist on Monday. 


Return to ED if symptoms worsen

## 2017-07-27 NOTE — PDOC
History of Present Illness





- General


Chief Complaint: Altered Mental Status


Stated Complaint: SEIZURE


Time Seen by Provider: 07/27/17 15:23





- History of Present Illness


Initial Comments: 





07/27/17 20:15


Patient is a 61 year old male with a history of HTN, DM who presents following 

a seizure with AMS.  Patient reportedly had a seizure at home before his son 

called EMS to take him to the ED.  The patient states that he has not slept in 

3 days because he ran out of a medication he usually takes for sleep.  He does 

not know what medication he takes.  He states that he otherwise feels fine.  He 

denies any tongue biting, fevers, chills, chest pain, SOB, abdominal pain, or 

changes with urination or bowel movements.  The patient has a history of 

alcohol abuse, but states that he has been sober for 3-4 years now.  Of note, 

the patient had an admission in January for the exact same complaints with the 

same history provided.  He was determined to be in benzo withdrawal and 

responded well to valium.





Past History





- Past Medical History


Allergies/Adverse Reactions: 


 Allergies











Allergy/AdvReac Type Severity Reaction Status Date / Time


 


No Known Allergies Allergy   Verified 02/04/17 15:28











Home Medications: 


Ambulatory Orders





Cholecalciferol (Vitamin D3) [Vitamin D3] 50,000 unit PO WEEKLY 11/15/16 


Empagliflozin [Jardiance] 25 mg PO DAILY 11/15/16 


Escitalopram Oxalate [Lexapro -] 10 mg PO DAILY 11/15/16 


Linaclotide [Linzess] 145 mcg PO DAILY 11/15/16 


Nifedipine [Adalat cc] 60 mg PO DAILY 11/15/16 


Pantoprazole Sodium 40 mg PO DAILY 11/15/16 


Quetiapine Fumarate [Seroquel] 100 tab DAILY 11/15/16 


Vitamin B Comp W-C [Total B with C -] 1 each PO DAILY 11/15/16 


Gabapentin [Neurontin -] 100 mg PO TID #90 capsule 01/27/17 


Insulin (Levemir) [Levemir Vial] 35 units SQ AM  ml 01/27/17 


Lisinopril [Prinivil] 5 mg PO DAILY #30 tablet 01/27/17 


Temazepam [Restoril -] 15 mg PO HS  capsule MDD 1 01/27/17 


Polyethylene Glycol 3350 [Miralax (For Bowel Prep) -] 17 gm PO ONCE #1 bottle 02 /04/17 








Diabetes: Yes


Dialysis: Yes


HTN: Yes


Liver Disease: Yes


Psychiatric Problems: Yes


Seizures: Yes





- Immunization History


Immunization Up to Date: Yes





- Psycho/Social/Smoking Cessation Hx


Anxiety: No


Suicidal Ideation: No


Smoking Status: No


Smoking History: Never smoked


Have you smoked in the past 12 months: No


Number of Cigarettes Smoked Daily: 0


Cigars Per Day: 0


Information on smoking cessation initiated: No


Hx Alcohol Use: Yes


Drug/Substance Use Hx: Yes


Substance Use Type: None


Hx Substance Use Treatment: No





**Review of Systems





- Review of Systems


Constitutional: No: Chills, Fever


HEENTM: No: Recent change in vision


Respiratory: No: Cough, Shortness of Breath


Cardiac (ROS): No: Chest Pain, Palpitations, Chest Tightness


ABD/GI: No: Constipated, Diarrhea, Nausea, Vomiting


: No: Burning, Dysuria


Integumentary: No: Rash


Neurological: No: Headache, Numbness, Tingling, Weakness, Dizziness





*Physical Exam





- Vital Signs


 Last Vital Signs











Temp Pulse Resp BP Pulse Ox


 


 98.8 F   90   22   110/65   99 


 


 07/27/17 15:34  07/27/17 15:34  07/27/17 15:34  07/27/17 15:34  07/27/17 15:34














- Physical Exam


Comments: 





07/27/17 20:32


General Appearance: Nourished.  No Apparent Distress


HEENT:  ALVIN.  No Pharyngeal Erythema, Tonsillar Exudate, Tonsillar Erythema


Respiratory/Chest:  Lungs Clear, Normal Breath Sounds.  No Crackles, Rales, 

Rhonchi, Wheezing


Cardiovascular:  Regular Rhythm, Regular Rate.  No Murmur, Gallop/S3, Gallop/S4


Gastrointestinal/Abdominal:  Normal Bowel Sounds, Soft.  No Guarding, Rebound, 

Tenderness


Extremity:  Normal Capillary Refill


Integumentary:  Normal Color, Dry, Warm


Neurologic:  CNs II-XII NML intact, Fully Oriented, Alert, Normal Mood/Affect, 

Normal Response, Motor Strength 5/5





ED Treatment Course





- LABORATORY


CBC & Chemistry Diagram: 


 07/28/17 07:00





 07/28/17 07:00





- ADDITIONAL ORDERS


Additional order review: 


 Laboratory  Results











  07/27/17





  15:27


 


POC Glucometer  211.24778








 











  07/27/17





  15:27


 


POC Glucometer  211.17012














- RADIOLOGY


Radiology Studies Ordered: 














 Category Date Time Status


 


 HEAD CT WITHOUT CONTRAST [CT] Stat CT Scan  07/27/17 17:00 Ordered














Medical Decision Making





- Medical Decision Making


07/27/17 20:33


Patient is a 61 year old male who presents with AMS following a possible 

seizure.  It is unclear from the patient's history what the events were that 

led to his presentation or if he did have a seizure.  The patient is oriented 

x3 here in the ED and does not appear post ictal on exam.  He has no focal 

neurological deficits and his physical exam is benign.  He does not have any 

complaints, however he appears to be somewhat slurring his speech during 

interview.  He is a poor historian and we will need to contact the family to 

obtain a better history of the events that led to his presentation.  However 

given that his presentation is very similar to his presentation in January, we 

suspect that he is once again experiencing benzo withdrawal.  We will get a CT 

head to evaluate for intracranial process as well as a cbc, cmp, alcohol level, 

ammonia level, UA, and urine tox for further evaluation.  We will treat with 

10mg of PO Valium to evaluate for his response.











07/27/17 20:57


Patient's cbc, cmp, alcohol level, and ammonia level are unremarkable.  CT head 

is unremarkable as read by our radiologist.  UA and urine tox are pending.  

Patient appears somewhat improved with 10mg of Valium.  We are still waiting to 

get in contact with the family for better history.





07/27/17 20:58


Patient signed out to Dr. Finn.





*DC/Admit/Observation/Transfer


Diagnosis at time of Disposition: 


 Seizure disorder





- Discharge Dispostion


Disposition: AGAINST MEDICAL ADVICE





- Attestations


Physician Attestion: 





07/31/17 19:19








I, Dr. Damaso Gonzalez, attest that this document has been prepared under my 

direction and personally reviewed by me in its entirety.   I further attest, 

that it accurately reflects all work, treatment, procedures and medical decision

-making performed by me.

## 2017-07-28 NOTE — PDOC
*Physical Exam





- Vital Signs


 Last Vital Signs











Temp Pulse Resp BP Pulse Ox


 


 98.1 F   84   20   158/95   99 


 


 07/27/17 23:31  07/27/17 23:31  07/27/17 23:31  07/27/17 23:31  07/27/17 23:44














07/28/17 00:02


GENERAL: Awake, alert, in no acute distress


HEAD: No signs of trauma, normocephalic, atraumatic 


EYES: PERRLA, EOMI, sclera anicteric, conjunctiva clear


ENT: Auricles normal inspection, hearing grossly normal, nares patent, 

oropharynx clear without


exudates. Moist mucosa


NECK: Normal ROM, supple, no lymphadenopathy, JVD, or masses


LUNGS: No distress, speaks full sentences, clear to auscultation bilaterally 


HEART: Regular rate and rhythm, normal S1 and S2, no murmurs, rubs or gallops, 

peripheral pulses normal and equal bilaterally. 


ABDOMEN: Soft, nontender, normoactive bowel sounds.  No guarding, no rebound.  

No masses


EXTREMITIES: Normal inspection, Normal range of motion, no edema.  No clubbing 

or cyanosis. 


NEUROLOGICAL: Cranial nerves II through XII grossly intact.  Normal speech, 

normal gait, no focal sensorimotor deficits 


SKIN: Warm, Dry, normal turgor, no rashes or lesions noted. 








- Physical Exam


Comments: 


07/28/17 01:15


GENERAL: Awake, and in no acute distress


HEAD: No signs of trauma, normocephalic, atraumatic 


EYES: PERRLA, EOMI, sclera anicteric, conjunctiva clear


ENT: Auricles normal inspection, hearing grossly normal, nares patent, 

oropharynx clear without


exudates. Moist mucosa


NECK: Normal ROM, supple, no lymphadenopathy, JVD, or masses


LUNGS: No distress, speaks full sentences, clear to auscultation bilaterally 


HEART: Regular rate and rhythm, normal S1 and S2, no murmurs, rubs or gallops, 

peripheral pulses normal and equal bilaterally. 


ABDOMEN: Soft, nontender, normoactive bowel sounds.  No guarding, no rebound.  

No masses


EXTREMITIES: Normal inspection, Normal range of motion, no edema.  No clubbing 

or cyanosis. 


NEUROLOGICAL: Cranial nerves II through XII grossly intact.  Normal speech, 

normal gait, no focal sensorimotor deficits 


SKIN: Warm, Dry, normal turgor, no rashes or lesions noted. 











ED Treatment Course





- LABORATORY


CBC & Chemistry Diagram: 


 07/27/17 17:11





 07/27/17 17:11





- ADDITIONAL ORDERS


Additional order review: 


 Laboratory  Results











  07/27/17 07/27/17 07/27/17





  17:11 17:11 15:27


 


Sodium   131 L 


 


Potassium   4.5 


 


Chloride   96 L 


 


Carbon Dioxide   28 


 


Anion Gap   7 L 


 


BUN   18 


 


Creatinine   1.2 


 


Creat Clearance w eGFR   > 60 


 


POC Glucometer    211.37180


 


Random Glucose   166 H D 


 


Calcium   9.5 


 


Total Bilirubin   1.0  D 


 


AST   29  D 


 


ALT   37  D 


 


Alkaline Phosphatase   114 


 


Ammonia  21.55  


 


Total Protein   7.8 


 


Albumin   4.0 








 











  07/27/17 07/27/17





  17:11 15:27


 


RBC  4.41 


 


MCV  89.6 


 


MCHC  33.7 


 


RDW  13.1 


 


MPV  8.2 


 


Neutrophils %  76.7  D 


 


Lymphocytes %  13.2  D 


 


Monocytes %  9.5 


 


Eosinophils %  0.5 


 


Basophils %  0.1 


 


POC Glucometer   211.53216














- Medications


Given in the ED: 


ED Medications














Discontinued Medications














Generic Name Dose Route Start Last Admin





  Trade Name Eliezer  PRN Reason Stop Dose Admin


 


Diazepam  10 mg 07/27/17 16:59 07/27/17 17:37





  Valium -  PO 07/27/17 17:00  10 mg





  ONCE ONE   Administration














Progress Note





- Progress Note


Progress Note: 


62 yo M with h/o HTN, DM, Insomnia, and alcohol abuse who presents with AMS. 

Pt. Italian speaking and daughter at bedside to assist in history. States that 

her father has not been sleeping for past 3 days and has been acting out of 

character. He has been repeatedly walking up the steps and making incoherent 

statements. She did not witness pt. seizure, but reports him having h/o drop 

attacks to floor. He recently ran out of Temazepam 100 mg QD, and has had 

trouble sleeping as a result. H/o recent admission 1/2017 for benzodiazepene 

withdrawal. Denies SOB, cough, lightheadedness, N/V, fevers/chills, GI 

complaints, or urinary complaints. Denies recent alcohol use or illicit drug 

use. 





Medical Decision Making





- Medical Decision Making


07/28/17 01:18


62 yo M with h/o HTN, DM, alcohol abuse who presents with AMS. Pt. currently 

stable, and physical exam is benign. No associated symptoms. Per pt. family he 

has been experiencing change in behavior for the past 3 days. He has also been 

experiencing insomnia since running out of medication Temazepam 100 mg PO QD. 

There are conflicting reports of witnessing pt. seizure. Per pt. daughter she 

states that her father experiences frequent drop attacks, and has had one 

episode witnessed convulsions. Recently no one has witnessed pt. seizure and it 

is believed he has had a drop attack as opposed to generalized tonic clonic 

seizure.  Per check out from Dr. Gonzalez pt has received full metabolic workup 

with unremarkable laboratory values, and a negative head CT scan. He has 

received 10 mg Diazepam. 





ED Course: 


07/28/17 02:05


UDS Negative 


Urine: 1 + Leuk Esterase, Negative Nitrite


Alcohol Quant <5





Admit pt. to Med/Surg





*DC/Admit/Observation/Transfer


Diagnosis at time of Disposition: 


 Seizure disorder

## 2017-07-28 NOTE — EKG
Test Reason : 

Blood Pressure : ***/*** mmHG

Vent. Rate : 081 BPM     Atrial Rate : 081 BPM

   P-R Int : 166 ms          QRS Dur : 094 ms

    QT Int : 384 ms       P-R-T Axes : 068 032 035 degrees

   QTc Int : 446 ms

 

NORMAL SINUS RHYTHM

NORMAL ECG

WHEN COMPARED WITH ECG OF 25-JAN-2017 12:18,

NO SIGNIFICANT CHANGE WAS FOUND

Confirmed by XANDER TRACY MD ([id number]) on 7/28/2017 9:18:30 AM

 

Referred By:             Confirmed By:XANDER TRACY MD

## 2017-07-28 NOTE — EKG
Test Reason : 

Blood Pressure : ***/*** mmHG

Vent. Rate : 069 BPM     Atrial Rate : 069 BPM

   P-R Int : 168 ms          QRS Dur : 098 ms

    QT Int : 406 ms       P-R-T Axes : 063 020 016 degrees

   QTc Int : 435 ms

 

NORMAL SINUS RHYTHM

NORMAL ECG

WHEN COMPARED WITH ECG OF 27-JUL-2017 22:52,

NO SIGNIFICANT CHANGE WAS FOUND

Confirmed by XANDER TRACY MD ([id number]) on 7/28/2017 9:16:07 AM

 

Referred By: YASMANI KIRBY           Confirmed By:XANDER TRACY MD

## 2017-07-28 NOTE — HP
Admitting History and Physical





- Admission


History of Present Illness: 


62 yo M with h/o HTN, DM, alcohol abuse who presents with AMS.  Per pt. family 

he has been experiencing change in behavior for the past 3 days. He has also 

been experiencing insomnia since running out of medication Temazepam 100 mg PO 

QD. There are conflicting reports of witnessing pt. seizure. Per pt. daughter 

she states that her father experiences frequent drop attacks, and has had one 

episode witnessed convulsions. Recently no one has witnessed pt. seizure and it 

is believed he has had a drop attack as opposed to generalized tonic clonic 

seizure.   pt has received full metabolic workup with unremarkable laboratory 

values, and a negative head CT scan. He has received 10 mg Diazepam. 








- Past Medical History


Cardiovascular: Yes: HTN


Psych: Yes: Addictions


Endocrine: Yes: Diabetes Mellitus





- Smoking History


Smoking history: Never smoked


Have you smoked in the past 12 months: No


Aproximately how many cigarettes per day: 0





- Alcohol/Substance Use


Hx Alcohol Use: Yes





Home Medications





- Allergies


Allergies/Adverse Reactions: 


 Allergies











Allergy/AdvReac Type Severity Reaction Status Date / Time


 


No Known Allergies Allergy   Verified 02/04/17 15:28














- Home Medications


Home Medications: 


Ambulatory Orders





Cholecalciferol (Vitamin D3) [Vitamin D3] 50,000 unit PO WEEKLY 11/15/16 


Empagliflozin [Jardiance] 25 mg PO DAILY 11/15/16 


Escitalopram Oxalate [Lexapro -] 10 mg PO DAILY 11/15/16 


Linaclotide [Linzess] 145 mcg PO DAILY 11/15/16 


Nifedipine [Adalat cc] 60 mg PO DAILY 11/15/16 


Pantoprazole Sodium 40 mg PO DAILY 11/15/16 


Quetiapine Fumarate [Seroquel] 100 tab DAILY 11/15/16 


Vitamin B Comp W-C [Total B with C -] 1 each PO DAILY 11/15/16 


Gabapentin [Neurontin -] 100 mg PO TID #90 capsule 01/27/17 


Insulin (Levemir) [Levemir Vial] 35 units SQ AM  ml 01/27/17 


Lisinopril [Prinivil] 5 mg PO DAILY #30 tablet 01/27/17 


Temazepam [Restoril -] 15 mg PO HS  capsule MDD 1 01/27/17 


Polyethylene Glycol 3350 [Miralax (For Bowel Prep) -] 17 gm PO ONCE #1 bottle 02 /04/17 











Review of Systems





- Review of Systems


Cardiovascular: denies: Chest Pain, Edema


Respiratory: denies: SOB, Wheezing


Gastrointestinal: reports: No Symptoms


Genitourinary: reports: No Symptoms


Neurological: reports: Change in LOC, Seizure, Syncope





Physical Examination


Vital Signs: 


 Vital Signs











Temperature  97.1 F L  07/28/17 07:48


 


Pulse Rate  78   07/28/17 07:48


 


Respiratory Rate  20   07/28/17 07:48


 


Blood Pressure  159/98   07/28/17 07:48


 


O2 Sat by Pulse Oximetry (%)  99   07/27/17 23:44











Cardiovascular: Yes: S1, S2


Respiratory: Yes: Regular, CTA Bilaterally


Gastrointestinal: Yes: Normal Bowel Sounds, Soft


Edema: No


Neurological: Yes: Alert, Oriented, Tremors





Problem List





- Problems


(1) Diabetes


Assessment/Plan: 


SAME MEDS


MONITOR BGM


Code(s): E11.9 - TYPE 2 DIABETES MELLITUS WITHOUT COMPLICATIONS   Qualifiers: 


     Diabetes mellitus type: type 2





(2) Hypertension


Assessment/Plan: 


MONITOR ON MEDS


Code(s): I10 - ESSENTIAL (PRIMARY) HYPERTENSION   Qualifiers: 


   





(3) Seizure disorder


Assessment/Plan: 


NEURO CONSULT


POSSIBLE WITHDRAWAL


Code(s): G40.909 - EPILEPSY, UNSP, NOT INTRACTABLE, WITHOUT STATUS EPILEPTICUS





(4) Syncope and collapse


Assessment/Plan: 


HOLTER


ECHO


CARDIO


Code(s): R55 - SYNCOPE AND COLLAPSE





(5) Alcohol dependence with uncomplicated withdrawal


Assessment/Plan: 


PT WITH TREMORS


DETOX CONSULT


LIBRIUM


MVI/THIAMINE


Code(s): F10.230 - ALCOHOL DEPENDENCE WITH WITHDRAWAL, UNCOMPLICATED

## 2017-07-28 NOTE — CON.CARD
Consult


Consult Specialty:: Cardiology


Referred by:: Dr Hernandez


Reason for Consultation:: changing mental status





- History of Present Illness


Chief Complaint: unresponsive


History of Present Illness: 


60 yo M with h/o HTN, DM, alcohol abuse who presents with AMS.  


Echo 7/27/17 nlef 





- History Source


History Provided By: Medical Record


Limitations to Obtaining History: Intoxication





- Past Medical History


Cardio/Vascular: Yes: HTN


Psych: Yes: Addictions


Endocrine: Yes: Diabetes Mellitus


Additional Medical History: falls -> neck pain





- Alcohol/Substance Use


Hx Alcohol Use: Yes





- Smoking History


Smoking history: Never smoked


Have you smoked in the past 12 months: No


Aproximately how many cigarettes per day: 0





- Social History


Usual Living Arrangement: With Spouse (lives with wife in apartment in house 

with 20-24 steps to enter; previously independent in ADLs, ambulated without 

Assistive device)





Home Medications





- Allergies


Allergies/Adverse Reactions: 


 Allergies











Allergy/AdvReac Type Severity Reaction Status Date / Time


 


No Known Allergies Allergy   Verified 02/04/17 15:28














- Home Medications


Home Medications: 


Ambulatory Orders





Cholecalciferol (Vitamin D3) [Vitamin D3] 50,000 unit PO WEEKLY 11/15/16 


Empagliflozin [Jardiance] 25 mg PO DAILY 11/15/16 


Escitalopram Oxalate [Lexapro -] 10 mg PO DAILY 11/15/16 


Linaclotide [Linzess] 145 mcg PO DAILY 11/15/16 


Nifedipine [Adalat cc] 60 mg PO DAILY 11/15/16 


Pantoprazole Sodium 40 mg PO DAILY 11/15/16 


Quetiapine Fumarate [Seroquel] 100 tab DAILY 11/15/16 


Vitamin B Comp W-C [Total B with C -] 1 each PO DAILY 11/15/16 


Gabapentin [Neurontin -] 100 mg PO TID #90 capsule 01/27/17 


Insulin (Levemir) [Levemir Vial] 35 units SQ AM  ml 01/27/17 


Lisinopril [Prinivil] 5 mg PO DAILY #30 tablet 01/27/17 


Temazepam [Restoril -] 15 mg PO HS  capsule MDD 1 01/27/17 


Polyethylene Glycol 3350 [Miralax (For Bowel Prep) -] 17 gm PO ONCE #1 bottle 02 /04/17 








Vital Signs: 


 Vital Signs











Temperature  97.9 F   07/28/17 11:40


 


Pulse Rate  70   07/28/17 11:40


 


Respiratory Rate  20   07/28/17 11:40


 


Blood Pressure  161/67   07/28/17 11:40


 


O2 Sat by Pulse Oximetry (%)  99   07/28/17 09:00











Constitutional: Yes: No Distress, Calm


Eyes: Yes: Conjunctiva Clear, EOM Intact


HENT: Yes: Atraumatic, Normocephalic


Respiratory: Yes: CTA Bilaterally


Gastrointestinal: Yes: Normal Bowel Sounds, Soft


Cardiovascular: Yes: Regular Rate and Rhythm


JVD: No


Carotid Bruit: No


PMI: Non-Displaced


Heart Sounds: Yes: S1, S2


Edema: No


Peripheral Pulses WNL: Yes





- Other Data


Labs, Other Data: 


 CBC, BMP





 07/28/17 07:00 





 07/28/17 07:00 





 Troponin, BNP











  07/28/17





  07:00


 


Troponin I  < 0.02








 Troponin, BNP











  07/28/17





  07:00


 


Troponin I  < 0.02














Imaging





- Results


EKG: Report Reviewed (normal ECG.)





Problem List





- Problems


(1) Syncope and collapse


Assessment/Plan: 


ECG and echo unremarkable.


Tele negative.


Likely this is due to withdrawl.


No evidence of ACS or arrhythmia. 





Code(s): R55 - SYNCOPE AND COLLAPSE

## 2017-07-28 NOTE — PDOC
Attending Attestation





- Resident


Resident Name: Damaso Gonzalez





- ED Attending Attestation


I have performed the following: I have examined & evaluated the patient, The 

case was reviewed & discussed with the resident, I agree w/resident's findings 

& plan, Exceptions are as noted





- HPI


HPI: 





07/28/17 09:19


60 yo male with hx of alcohol abuse ran out of benzodiazepine a few days ago 

and had a seizure at home just prior to the family summoning 911-





- Physicial Exam


PE: 





07/28/17 09:20


Non Toxic, NAD, Non-focal neuro exam





- Medical Decision Making





07/28/17 09:20


I agree with the residents assessment, workup and plan

## 2017-11-26 NOTE — PDOC
*Physical Exam





- Vital Signs


 Last Vital Signs











Temp Pulse Resp BP Pulse Ox


 


 97.9 F   94 H  18   149/94   99 


 


 11/26/17 02:01  11/26/17 02:01  11/26/17 02:01  11/26/17 02:01  11/26/17 02:01














- Physical Exam


General Appearance: Yes: Appropriately Dressed.  No: Apparent Distress


Respiratory/Chest: positive: Lungs Clear, Normal Breath Sounds.  negative: 

Respiratory Distress, Accessory Muscle Use


Cardiovascular: positive: Regular Rhythm, Regular Rate


Gastrointestinal/Abdominal: positive: Normal Bowel Sounds, Soft.  negative: 

Tender


Extremity: positive: Normal Capillary Refill, Normal Inspection


Integumentary: positive: Normal Color, Dry


Neurologic: positive: Alert, Normal Mood/Affect





ED Treatment Course





- LABORATORY


CBC & Chemistry Diagram: 


 11/26/17 03:47





 11/26/17 06:31





- ADDITIONAL ORDERS


Additional order review: 


 Laboratory  Results











  11/26/17 11/26/17





  06:31 03:47


 


Sodium  138  135 L


 


Potassium  3.9  3.9


 


Chloride  106  101


 


Carbon Dioxide  25  27


 


Anion Gap  7 L  7 L


 


BUN  17  20 H D


 


Creatinine  1.1  D  1.4 H D


 


Creat Clearance w eGFR  > 60  51.52


 


Random Glucose  255 H D  341 H* D


 


Calcium  8.1 L  8.7


 


Total Bilirubin  0.5  D  0.4  D


 


AST  16  14 L D


 


ALT  29  29  D


 


Alkaline Phosphatase  112  118 H


 


Total Protein  7.1  7.2


 


Albumin  3.5  3.5








 











  11/26/17





  03:47


 


RBC  4.07


 


MCV  92.3


 


MCHC  34.2


 


RDW  13.0


 


MPV  8.5


 


Neutrophils %  53.6


 


Lymphocytes %  32.8


 


Monocytes %  11.5 H


 


Eosinophils %  1.8


 


Basophils %  0.3














- Medications


Given in the ED: 


ED Medications














Discontinued Medications














Generic Name Dose Route Start Last Admin





  Trade Name Freq  PRN Reason Stop Dose Admin


 


Sodium Chloride  1,000 mls @ 1,000 mls/hr  11/26/17 03:13  11/26/17 03:45





  Normal Saline -  IV  11/26/17 04:12  1,000 mls/hr





  ASDIR STA   Administration














Medical Decision Making





- Medical Decision Making





11/26/17 08:04


I have received report from [Tonia DIANA] regarding this patient. 





Pt's initial chief complaint: [Hyperglycemia]





Pt's work up completed prior to sign out:  [ CMP, CBC]





Pt treatment given from prior staff:  [IV fluids, repeat CMP ]





Pt plan to be completed:  [Patient is insulin-dependent, complaining, repeat 

CMP showed blood sugar of 255 will DC patient home to follow up with endocrine 

strict monitoring of blood glucose]











*DC/Admit/Observation/Transfer


Diagnosis at time of Disposition: 


 Hyperglycemia








- Discharge Dispostion


Disposition: HOME


Condition at time of disposition: Stable


Admit: No





- Referrals


Referrals: 


Milena Vang MD [Primary Care Provider] - 





- Patient Instructions


Printed Discharge Instructions:  DI for Hyperglycemia -- Adult


Additional Instructions: 


Be sure to follow up with your endocrinologist


Return to the Er for any concerns


Strict monitoring of your blood glucose and coverage with insulin as per your 

endocrinologist





- Post Discharge Activity

## 2017-11-26 NOTE — PDOC
History of Present Illness





- General


Chief Complaint: Blood Sugar Problem


Stated Complaint: HYPERGLYCEMIA


Time Seen by Provider: 11/26/17 03:02


History Source: Patient


Exam Limitations: No Limitations





- History of Present Illness


Initial Comments: 





11/26/17 04:55


61-year-old male with a history of IDDM presents to the emergency department 

complaining of hyperglycemia. Patient states when he took his glucose at home 

registered at 399. Patient states he knew he was going to be high because he 

was eating bread all day yesterday. Patient denies fever, chills, nausea/

vomiting, body aches, general malaise, visual disturbance, diplopia, facial 

pains, neck pains, back pains, chest pain, shortness of breath, abdominal pains

, urinary symptoms, extremity numbness or tingling sensation. Patient states he 

feels fine and has no physical complaints


Timing/Duration: 1-3 hours





Past History





- Past Medical History


Allergies/Adverse Reactions: 


 Allergies











Allergy/AdvReac Type Severity Reaction Status Date / Time


 


No Known Allergies Allergy   Verified 11/26/17 02:04











Home Medications: 


Ambulatory Orders





Cholecalciferol (Vitamin D3) [Vitamin D3] 50,000 unit PO WEEKLY 11/15/16 


Empagliflozin [Jardiance] 25 mg PO DAILY 11/15/16 


Escitalopram Oxalate [Lexapro -] 10 mg PO DAILY 11/15/16 


Linaclotide [Linzess] 145 mcg PO DAILY 11/15/16 


Nifedipine [Adalat cc] 60 mg PO DAILY 11/15/16 


Pantoprazole Sodium 40 mg PO DAILY 11/15/16 


Quetiapine Fumarate [Seroquel] 100 tab DAILY 11/15/16 


Vitamin B Comp W-C [Total B with C -] 1 each PO DAILY 11/15/16 


Gabapentin [Neurontin -] 100 mg PO TID #90 capsule 01/27/17 


Insulin (Levemir) [Levemir Vial] 35 units SQ AM  ml 01/27/17 


Lisinopril [Prinivil] 5 mg PO DAILY #30 tablet 01/27/17 


Temazepam [Restoril -] 15 mg PO HS  capsule MDD 1 01/27/17 


Polyethylene Glycol 3350 [Miralax (For Bowel Prep) -] 17 gm PO ONCE #1 bottle 02 /04/17 








COPD: No


Diabetes: Yes


Dialysis: Yes


HTN: Yes


Liver Disease: Yes


Psychiatric Problems: Yes


Seizures: Yes





- Immunization History


Immunization Up to Date: Yes





- Suicide/Smoking/Psychosocial Hx


Smoking Status: No


Smoking History: Never smoked


Have you smoked in the past 12 months: No


Number of Cigarettes Smoked Daily: 0


Cigars Per Day: 0


Hx Alcohol Use: Yes


Drug/Substance Use Hx: Yes


Substance Use Type: None


Hx Substance Use Treatment: No





**Review of Systems





- Review of Systems


Able to Perform ROS?: Yes


Comments:: 





11/26/17 04:57


CONSTITUTIONAL: 


Absent: fever, chills, diaphoresis, generalized weakness, malaise, loss of 

appetite


HEENT: 


Absent: rhinorrhea, nasal congestion, throat pain, throat swelling, difficulty 

swallowing, mouth swelling, ear pain, eye pain, visual Changes


CARDIOVASCULAR: 


Absent: chest pain, loss of consciousness, palpitations, irregular heart rate, 

peripheral edema


RESPIRATORY: 


Absent: cough, shortness of breath, dyspnea with exertion, orthopnea, wheezing, 

stridor, hemoptysis


GASTROINTESTINAL:


Absent: abdominal pain, abdominal distension, nausea, vomiting, diarrhea, 

constipation, melena, hematochezia


GENITOURINARY: 


Absent: dysuria, frequency, urgency, hesitancy, hematuria, flank pain, genital 

pain


MUSCULOSKELETAL: 


Absent: myalgia, arthralgia, joint swelling


SKIN: 


Absent: rash, itching, pallor


HEMATOLOGIC/IMMUNOLOGIC: 


Absent: easy bleeding, easy bruising, lymphadenopathy, frequent infections


ENDOCRINE:


Absent: unexplained weight gain, unexplained weight loss, heat intolerance, 

cold intolerance


NEUROLOGIC: 


Absent: headache, focal weakness or paresthesias, dizziness, unsteady gait, 

seizure, mental status changes, bladder or bowel incontinence


PSYCHIATRIC: 


Absent: anxiety, depression, suicidal or homicidal ideation, hallucinations.


Is the patient limited English proficient: No





*Physical Exam





- Vital Signs


 Last Vital Signs











Temp Pulse Resp BP Pulse Ox


 


 97.9 F   94 H  18   149/94   99 


 


 11/26/17 02:01  11/26/17 02:01  11/26/17 02:01  11/26/17 02:01  11/26/17 02:01














- Physical Exam


Comments: 





11/26/17 04:57


GENERAL:


Well developed, well nourished. Awake and alert. No acute distress.


HEENT:


Normocephalic, atraumatic. PERRLA, EOMI. No conjunctival pallor. Sclera are non-

icteric. Moist mucous membranes. Oropharynx is clear.


NECK: 


Supple. Full ROM. No JVD. Carotid pulses 2+ and symmetric, without bruits. No 

thyromegaly. No lymphadenopathy.


CARDIOVASCULAR:


Regular rate and rhythm. No murmurs, rubs, or gallops. Distal pulses are 2+ and 

symmetric. 


PULMONARY: 


No evidence of respiratory distress. Lungs clear to auscultation bilaterally. 

No wheezing, rales or rhonchi.


ABDOMINAL:


Soft. Non-tender. Non-distended. No rebound or guarding. No organomegaly. 

Normoactive bowel sounds. 


MUSCULOSKELETAL 


Normal range of motion at all joints. No bony deformities or tenderness. No CVA 

tenderness.


EXTREMITIES: 


No cyanosis. No clubbing. No edema. No calf tenderness.


SKIN: 


Warm and dry. Normal capillary refill. No rashes. No jaundice. 


NEUROLOGICAL: 


Alert, awake, appropriate. Cranial nerves 2-12 intact. No deficits to light 

touch and temperature in face, upper extremities and lower extremities. No 

motor deficits in the in face, upper extremities and lower extremities. 

Normoreflexic in the upper and lower extremities. Normal speech. Toes are down-

going bilaterally. Gait is normal without ataxia.


PSYCHIATRIC: 


Cooperative. Good eye contact. Appropriate mood and affect.





ED Treatment Course





- LABORATORY


CBC & Chemistry Diagram: 


 11/26/17 03:47





 11/26/17 06:31





*DC/Admit/Observation/Transfer


Diagnosis at time of Disposition: 


 Hyperglycemia








- Discharge Dispostion


Disposition: HOME


Condition at time of disposition: Stable





- Referrals


Referrals: 


Milena Vang MD [Primary Care Provider] - 





- Patient Instructions


Printed Discharge Instructions:  DI for Hyperglycemia -- Adult


Additional Instructions: 


Be sure to follow up with your endocrinologist


Return to the Er for any concerns


Strict monitoring of your blood glucose and coverage with insulin as per your 

endocrinologist





- Post Discharge Activity

## 2018-02-22 NOTE — HP
Admitting History and Physical





- Primary Care Physician


PCP: Marsha Hernandez





- Admission


Chief Complaint: back pain, incontinence


History of Present Illness: 


HPI 


The patient is a 61 year old male with a significant PMH of HTN, HLD, DM II, 

seizures, hx of alcohol abuse. Per eval in ED, pt states he came to the ED with 

R lower back pain and incontinence. Per ED report the patient has been 

progressively weak in the past month with increased falls. The patient does 

endorse he doesn't like the hospital. Today, he complains of lower L back pain. 

He accepts velez insertion.


Denies nausea, vomiting, fever, chills, cp, sob. 


History Source: Patient, Medical Record


Limitations to Obtaining History: No Limitations





- Past Medical History


Cardiovascular: Yes: HTN


Psych: Yes: Addictions


Endocrine: Yes: Diabetes Mellitus





- Smoking History


Smoking history: Never smoked


Have you smoked in the past 12 months: No


Aproximately how many cigarettes per day: 0





- Alcohol/Substance Use


Hx Alcohol Use: Yes





Home Medications





- Allergies


Allergies/Adverse Reactions: 


 Allergies











Allergy/AdvReac Type Severity Reaction Status Date / Time


 


No Known Allergies Allergy   Verified 11/26/17 02:04














- Home Medications


Home Medications: 


Ambulatory Orders





Empagliflozin [Jardiance] 25 mg PO DAILY 11/15/16 


Nifedipine [Adalat cc] 60 mg PO DAILY 11/15/16 


Gabapentin [Neurontin -] 100 mg PO TID #90 capsule 01/27/17 


Insulin (Levemir) [Levemir Vial] 35 units SQ AM  ml 01/27/17 


Lisinopril [Prinivil] 5 mg PO DAILY #30 tablet 01/27/17 











Review of Systems





- Review of Systems


Constitutional: reports: Weakness


Eyes: reports: No Symptoms


HENT: reports: No Symptoms


Neck: reports: No Symptoms


Cardiovascular: reports: No Symptoms


Respiratory: reports: No Symptoms


Gastrointestinal: reports: No Symptoms


Genitourinary: reports: Incontinence


Musculoskeletal: reports: Back Pain


Integumentary: reports: No Symptoms


Neurological: reports: No Symptoms


Endocrine: reports: No Symptoms


Hematology/Lymphatic: reports: No Symptoms


Psychiatric: reports: No Symptoms





Physical Examination


Vital Signs: 


 Vital Signs











Temperature  98.2 F   02/22/18 10:58


 


Pulse Rate  114 H  02/22/18 10:58


 


Respiratory Rate  17   02/22/18 10:58


 


Blood Pressure  143/95   02/22/18 10:58


 


O2 Sat by Pulse Oximetry (%)  100   02/22/18 10:58











Constitutional: Yes: Calm


Eyes: Yes: Conjunctiva Clear


HENT: Yes: Atraumatic


Neck: Yes: Supple, Trachea Midline


Cardiovascular: Yes: Regular Rate and Rhythm, S1, S2


Respiratory: Yes: Regular, CTA Bilaterally


Gastrointestinal: Yes: Normal Bowel Sounds, Soft, Distention (mild)


Renal/: Yes: Other (no flank tenderness)


Musculoskeletal: Yes: Back Pain


Extremities: Yes: WNL


Edema: Yes


Edema: LLE: 2+, RLE: 2+


Neurological: Yes: Alert, Oriented


Labs: 


 CBC, BMP





 02/22/18 12:15 





 02/22/18 12:15 











Imaging





- Results


Cat Scan: Report Reviewed (CT head no acute pathology  CTAP: b/l hydro and 

urinary retention (800cc) mild prostate enlargement)





Problem List





- Problems


(1) Hyperglycemia


Code(s): R73.9 - HYPERGLYCEMIA, UNSPECIFIED   





(2) UTI (urinary tract infection)


Code(s): N39.0 - URINARY TRACT INFECTION, SITE NOT SPECIFIED   


Qualifiers: 


   Urinary tract infection type: site unspecified   Hematuria presence: without 

hematuria   Qualified Code(s): N39.0 - Urinary tract infection, site not 

specified   





(3) Urinary retention


Code(s): R33.9 - RETENTION OF URINE, UNSPECIFIED   





(4) EMILIE (acute kidney injury)


Code(s): N17.9 - ACUTE KIDNEY FAILURE, UNSPECIFIED   





(5) Alcoholic liver disease


Code(s): K70.9 - ALCOHOLIC LIVER DISEASE, UNSPECIFIED   





Assessment/Plan





Assessment: 61 year old male admitted with increased weakness, falls, 

incontinence 





Plan: 





1. UTI


- Obtain urine cx 


- Ceftriaxone x1, to continue daily 





2. Urinary retention 


- Place velez 





3. Bilateral hydro


- Urology eval (defer to Primary for consult placement) 


- Consider flomax for enlarged prostate 





4. Hyperglycemia 


- Repeat BMP now 


- Received 1 liter fluids in ED 


- Insulin 5 units 


- UA negative for ketones 





5. Hyperkalemia


- Repeat BMP now 





6. EMILIE


- Likely post renal due to above 


- Continue fluids 





7. Weakness/unsteady gait


-  to assign VNS 





8. HTN


- Nifedipine


- Lisinopril 





9. DM II 


- Levemir 35units in aM 


- ISS, BGM ACHS 


 


10. DVT


- heparin sq 





Visit type





- Emergency Visit


Emergency Visit: Yes


ED Registration Date: 02/22/18


Care time: The patient presented to the Emergency Department on the above date 

and was hospitalized for further evaluation of their emergent condition.





- New Patient


This patient is new to me today: Yes


Date on this admission: 02/22/18





- Critical Care


Critical Care patient: No





Hospitalist Screening





- Colonoscopy Questionnaire


Colonoscopy Questionnaire: 





Colonoscopy Questionnaire








-   Patient:


50 - 75 years old and never had a screening colonoscopy: Unknown


History of colon or rectal polyps, or CA: Unknown


History of IBD, Crohn's disease or UC: Unknown


History of abdominal radiation therapy as a child: Unknown





-   Relative:


1 with colon or rectal CA, or polyps at age 60 or younger: Unknown


Colon or rectal CA diagnosed at age 45 or younger: Unknown


Multiple relatives with colon or rectal CA: Unknown





-   Outcome:


Screening Result: Negative Screen

## 2018-02-22 NOTE — PDOC
History of Present Illness





- General


Stated Complaint: SUGAR PROBLEM


Time Seen by Provider: 02/22/18 10:54





- History of Present Illness


Initial Comments: 





02/22/18 10:55


Mr. Lloyd is a 62 yo male w/ pmh of HTN, DM, and alcohol abuse BIBA with 

altered speech. Family reports they called EMS as over the last month he has 

been experiencing increased falls, generalized weakness, dysarthric speech and 

had an episode of incontinence last night where he couldn't make it to the 

bathroom. He has not wanted to go to the doctor but as he is by himself during 

the day family is worried about him and his care.





The patient denies chest pain, shortness of breath, headache and dizziness. 

Denies fever, chills, nausea, vomit, diarrhea and constipation. Denies dysuria, 

frequency, urgency and hematuria.





Allergies: NKDA





Past History





- Past Medical History


Allergies/Adverse Reactions: 


 Allergies











Allergy/AdvReac Type Severity Reaction Status Date / Time


 


No Known Allergies Allergy   Verified 11/26/17 02:04











Home Medications: 


Ambulatory Orders





Empagliflozin [Jardiance] 25 mg PO DAILY 11/15/16 


Nifedipine [Adalat cc] 60 mg PO DAILY 11/15/16 


Gabapentin [Neurontin -] 100 mg PO TID #90 capsule 01/27/17 


Insulin (Levemir) [Levemir Vial] 35 units SQ AM  ml 01/27/17 


Lisinopril [Prinivil] 5 mg PO DAILY #30 tablet 01/27/17 








COPD: No


Diabetes: Yes


Dialysis: Yes


HTN: Yes


Liver Disease: Yes


Psychiatric Problems: Yes


Seizures: Yes





- Immunization History


Immunization Up to Date: Yes





- Suicide/Smoking/Psychosocial Hx


Smoking Status: No


Smoking History: Never smoked


Have you smoked in the past 12 months: No


Number of Cigarettes Smoked Daily: 0


Cigars Per Day: 0


Hx Alcohol Use: Yes


Drug/Substance Use Hx: Yes


Substance Use Type: None


Hx Substance Use Treatment: No





**Review of Systems





- Review of Systems


Comments:: 





02/22/18 11:17


GENERAL/CONSTITUTIONAL: No fever or chills. No weakness.


HEAD, EYES, EARS, NOSE AND THROAT: No change in vision. No ear pain or 

discharge. No sore throat.


CARDIOVASCULAR: No chest pain or shortness of breath


RESPIRATORY: No cough, wheezing, or hemoptysis.


GASTROINTESTINAL: No nausea, vomiting, diarrhea or constipation.


GENITOURINARY: No dysuria, frequency, or change in urination.


MUSCULOSKELETAL: No joint or muscle swelling or pain. No neck or back pain.


SKIN: No rash


NEUROLOGIC: No headache, vertigo, loss of consciousness, or change in strength/

sensation.


ENDOCRINE: No increased thirst. No abnormal weight change


HEMATOLOGIC/LYMPHATIC: No anemia, easy bleeding, or history of blood clots.


ALLERGIC/IMMUNOLOGIC: No hives or skin allergy.











*Physical Exam





- Physical Exam


Comments: 





02/22/18 11:17


GENERAL: Awake, alert, and fully oriented, in no acute distress


HEAD: No signs of trauma, normocephalic, atraumatic


EYES: PERRLA, EOMI, sclera anicteric, conjunctiva clear


ENT: Auricles normal inspection, hearing grossly normal, nares patent, 

oropharynx clear without exudates. Moist mucosa


NECK: Normal ROM, supple, no lymphadenopathy, JVD, or masses


LUNGS: No distress, speaks full sentences, clear to auscultation bilaterally


HEART: Regular rate and rhythm, normal S1 and S2, no murmurs, rubs or gallops, 

peripheral pulses normal and equal bilaterally.


ABDOMEN: +Abdomen generally painful to palpation. normoactive bowel sounds.  No 

guarding, no rebound.  No masses


EXTREMITIES: Normal inspection, Normal range of motion, no edema.  No clubbing 

or cyanosis.


NEUROLOGICAL: Cranial nerves II through XII grossly intact.  Normal speech, 

normal gait, no focal sensorimotor deficits


SKIN: Warm, Dry, normal turgor, no rashes or lesions noted.


02/22/18 16:22








ED Treatment Course





- LABORATORY


CBC & Chemistry Diagram: 


 02/22/18 12:15





 02/22/18 12:15





Medical Decision Making





- Medical Decision Making





02/22/18 16:35


Mr. Lloyd is a 62 yo male w/ pmh as described who presents for increasing 

slurred speech, weakness, and falls over the past month. UA positive for UTI; 

CT abdomen ordered to evaluate abdominal pain and positive for urinary 

retention and bilateral hydronephrosis. Will admit for observation for further 

care/workup.





02/22/18 17:09


1gm rocephin given to patient for treatment with placement of velez catheter.





*DC/Admit/Observation/Transfer


Diagnosis at time of Disposition: 


 Hyperglycemia, Urinary retention





UTI (urinary tract infection)


Qualifiers:


 Urinary tract infection type: site unspecified Hematuria presence: without 

hematuria Qualified Code(s): N39.0 - Urinary tract infection, site not specified








- Discharge Dispostion


Admit: Yes





- Referrals


Referrals: 


Milena Vang MD [Primary Care Provider] - 





- Patient Instructions





- Post Discharge Activity

## 2018-02-22 NOTE — PDOC
Attending Attestation





- [facility]


HPI: 





02/22/18 11:28


The patient is a 61 year old male with a significant PMH of HTN, diabetes, 

seizures, alcohol abuse who presents to the emergency department via EMS with 1 

month of worsening speech and other progressively worsening symptoms over the 

past month. The family reports the patient has been falling more frequently, 

has been generally weak with altered speech, and had an episode of urinary 

incontinence yesterday night and couldnt reach the bathroom. The family reports 

the patient has been reluctant to go the doctor or ED and the family is worried 

about their ability to properly care for him. 


 


Allergies: NKA











- Physicial Exam


PE: 





02/22/18 11:28


Vitals: Triage Vital signs reviewed


General Appearance:  no acute distress, well nourished well developed,


Head: Atraumatic, normocephalic


Cardiac: Regular rate and rhythm, no murmurs, no rubs, no gallops,


Lungs: Clear to auscultation bilateral, good air movement bilaterally,


Abdomen: (+) Diffuse tenderness to palpation. (+) Tympanitic abdomen. (+) 

Slightly distended. 


Rectal: Exam deferred


Extremities: Full range of motion to all extremities, no cyanosis, clubbing, or 

edema


Neuro: AOX3; Cranial Nerves 2-12 grossly intact, Strength intact to all 

extremities, Sensation intact to all extremities


Psych:  normal mood, normal affect








<Tai Chandler - Last Filed: 02/22/18 11:59>





- Resident


Resident Name: Filiberto Zavala





- ED Attending Attestation


I have performed the following: I have examined & evaluated the patient, The 

case was reviewed & discussed with the resident, I agree w/resident's findings 

& plan, Exceptions are as noted





- HPI


HPI: 








02/22/18 11:57





61-year-old gentleman past medical history significant for hypertension 

diabetes seizure alcohol abuse CVA in the past sent into the emergency 

department by family for increased falls at home week and last night he was 

unable to make it to the bathroom was incontinent home. Lives in the upstairs 

area of an apartment with wife who is unable to care appropriate for the 

patient sentences emergency department by daughters. Here in the emergency 

department patient with a nonfocal neurologic examination his speech does 

appear slurred blood per the family conversation on the phone this appears to 

be his baseline.





His examination is otherwise nonfocal except for somewhat diffuse abdominal 

tenderness we will check labs EKG head CT abdominal CT observe reassess








- Medical Decision Making





02/22/18 15:12





61 years old with past medical history significant for hypertension diabetes 

seizures alcohol abuse presents to the ED with several week history of 

increasing falls at home incontinence worsening speech





It appears that family is unable to care for patient adequately





His lab work is grossly unremarkable he had some tenderness on his abdominal 

examination a CAT scan of his abdomen is pending





Dr. Li to  follow up CAT scan plan is to admit








<Arnold Bermudez - Last Filed: 02/22/18 15:12>

## 2018-02-23 NOTE — PN
Progress Note, Physician


Chief Complaint: 





admitted for urinary incontinence and unsteady gait





- Current Medication List


Current Medications: 


Active Medications





Acetaminophen (Tylenol -)  650 mg PO Q4H PRN


   PRN Reason: PAIN LEVEL 1-5


Gabapentin (Neurontin -)  100 mg PO TID [facility]


   Last Admin: 02/23/18 06:33 Dose:  100 mg


Heparin Sodium (Porcine) (Heparin -)  5,000 unit SQ TID [facility]


   Last Admin: 02/23/18 06:33 Dose:  5,000 unit


CEFTRIAXONE 1 G/50 ML PREMIX (Ceftriaxone 1 Gm-D5w Bag)  50 mls @ 100 mls/hr 

IVPB DAILY [facility]


   Last Admin: 02/23/18 09:22 Dose:  100 mls/hr


Sodium Chloride (Normal Saline -)  1,000 mls @ 83 mls/hr IV ASDIR [facility]


   Last Admin: 02/22/18 22:02 Dose:  83 mls/hr


Insulin Aspart (Novolog Vial Sliding Scale -)  1 vial SQ ACHS [facility]


   PRN Reason: Protocol


   Last Admin: 02/23/18 11:21 Dose:  6 unit


Insulin Detemir (Levemir Vial)  35 units SQ AM [facility]


   Last Admin: 02/23/18 06:34 Dose:  35 unit


Insulin Detemir (Levemir Vial)  10 units SQ HS [facility]


   Last Admin: 02/22/18 22:03 Dose:  10 unit


Lisinopril (Prinivil)  5 mg PO DAILY [facility]


   Last Admin: 02/23/18 09:22 Dose:  5 mg


Nifedipine (Procardia Xl -)  60 mg PO DAILY [facility]


   Last Admin: 02/23/18 09:22 Dose:  60 mg


Non-Formulary Medication (Empagliflozin [Jardiance])  25 mg PO DAILY [facility]


Quetiapine Fumarate (Seroquel -)  200 mg PO HS [facility]


   Last Admin: 02/22/18 23:09 Dose:  200 mg











- Objective


Vital Signs: 


 Vital Signs











Temperature  98.4 F   02/23/18 09:00


 


Pulse Rate  94 H  02/23/18 09:00


 


Respiratory Rate  18   02/23/18 09:00


 


Blood Pressure  151/97   02/23/18 09:00


 


O2 Sat by Pulse Oximetry (%)  94 L  02/23/18 09:00











Constitutional: Yes: Calm


Neck: Yes: Trachea Midline


Cardiovascular: Yes: Regular Rate and Rhythm, S1, S2


Respiratory: Yes: CTA Bilaterally


Gastrointestinal: Yes: Normal Bowel Sounds, Soft


Genitourinary: Yes: Velez Present


Edema: Yes


Neurological: Yes: Alert, Oriented


Labs: 


 CBC, BMP





 02/23/18 10:17 











Problem List





- Problems


(1) Hyperglycemia


Assessment/Plan: 


levemir bid


januvia


bgm


sliding scale


hgba1c


Code(s): R73.9 - HYPERGLYCEMIA, UNSPECIFIED   





(2) UTI (urinary tract infection)


Assessment/Plan: 


iv abx


Code(s): N39.0 - URINARY TRACT INFECTION, SITE NOT SPECIFIED   


Qualifiers: 


   Urinary tract infection type: site unspecified   Hematuria presence: without 

hematuria   Qualified Code(s): N39.0 - Urinary tract infection, site not 

specified   





(3) Urinary retention


Assessment/Plan: 


velez placed


urology evaluation


repeat bmp pending


trial of flomax


Code(s): R33.9 - RETENTION OF URINE, UNSPECIFIED   





(4) Hypertension


Assessment/Plan: 


lisinopril dose increase to 10mg


porcardia


Code(s): I10 - ESSENTIAL (PRIMARY) HYPERTENSION   


Qualifiers: 


 





(5) Weakness


Assessment/Plan: 


PT evaluation


Code(s): R53.1 - WEAKNESS

## 2018-02-24 NOTE — PN
Progress Note, Physician


Chief Complaint: 





urinary retention





- Current Medication List


Current Medications: 


Active Medications





Acetaminophen (Tylenol -)  650 mg PO Q4H PRN


   PRN Reason: PAIN LEVEL 1-5


Gabapentin (Neurontin -)  100 mg PO TID [facility]


   Last Admin: 02/24/18 06:07 Dose:  100 mg


Heparin Sodium (Porcine) (Heparin -)  5,000 unit SQ TID [facility]


   Last Admin: 02/24/18 06:07 Dose:  5,000 unit


CEFTRIAXONE 1 G/50 ML PREMIX (Ceftriaxone 1 Gm-D5w Bag)  50 mls @ 100 mls/hr 

IVPB DAILY [facility]


   Last Admin: 02/24/18 09:09 Dose:  100 mls/hr


Sodium Chloride (Normal Saline -)  1,000 mls @ 83 mls/hr IV ASDIR [facility]


   Last Admin: 02/24/18 08:47 Dose:  83 mls/hr


Insulin Aspart (Novolog Vial Sliding Scale -)  1 vial SQ ACHS [facility]


   PRN Reason: Protocol


   Last Admin: 02/24/18 06:17 Dose:  2 unit


Insulin Detemir (Levemir Vial)  35 units SQ AM [facility]


   Last Admin: 02/24/18 06:16 Dose:  35 unit


Insulin Detemir (Levemir Vial)  10 units SQ HS [facility]


   Last Admin: 02/23/18 21:43 Dose:  10 unit


Lisinopril (Prinivil)  10 mg PO DAILY [facility]


   Last Admin: 02/24/18 09:10 Dose:  10 mg


Nifedipine (Procardia Xl -)  60 mg PO DAILY [facility]


   Last Admin: 02/24/18 09:10 Dose:  60 mg


Quetiapine Fumarate (Seroquel -)  200 mg PO HS [facility]


   Last Admin: 02/23/18 21:44 Dose:  200 mg


Sitagliptin Phosphate (Januvia -)  50 mg PO DAILY@0700 [facility]


   Last Admin: 02/24/18 06:07 Dose:  50 mg











- Objective


Vital Signs: 


 Vital Signs











Temperature  97.8 F   02/24/18 08:53


 


Pulse Rate  90   02/24/18 08:53


 


Respiratory Rate  18   02/24/18 08:53


 


Blood Pressure  118/71   02/24/18 08:53


 


O2 Sat by Pulse Oximetry (%)  99   02/23/18 21:00











Constitutional: Yes: Well Nourished, No Distress, Calm


Eyes: Yes: WNL, Conjunctiva Clear


HENT: Yes: WNL, Atraumatic


Neck: Yes: WNL, Supple, Trachea Midline


Cardiovascular: Yes: WNL, Regular Rate and Rhythm


Respiratory: Yes: WNL, Regular, CTA Bilaterally


Gastrointestinal: Yes: WNL, Normal Bowel Sounds, Soft


...Rectal Exam: Yes: Deferred


Edema: Yes


Edema: LLE: Trace, RLE: Trace


Peripheral Pulses WNL: Yes


Integumentary: Yes: WNL


Wound/Incision: Yes: Clean/Dry, Well Approximated


Neurological: Yes: WNL, Alert, Oriented


...Motor Strength: WNL


Psychiatric: Yes: WNL, Alert, Oriented


Labs: 


 CBC, BMP





 02/23/18 10:17 





 02/23/18 10:17 











Problem List





- Problems


(1) Hyperglycemia


Assessment/Plan: 


c/w sitaglipitn 


c/w insulin sliding scale 


DMII diet 


Code(s): R73.9 - HYPERGLYCEMIA, UNSPECIFIED   





(2) UTI (urinary tract infection)


Assessment/Plan: 


patient with BPH and acute urinary retention with EMILIE 


c/w ceftriaxone 1 g for complicated cyctitis x 7 days 





Code(s): N39.0 - URINARY TRACT INFECTION, SITE NOT SPECIFIED   


Qualifiers: 


   Urinary tract infection type: site unspecified   Hematuria presence: without 

hematuria   Qualified Code(s): N39.0 - Urinary tract infection, site not 

specified   





(3) EMILIE (acute kidney injury)


Assessment/Plan: 


EMILIE post-renal obstruction s/p velez catheter placement 


f/u with urology 


c/w antibiotivs for 7 days due to complicated UTI 


Code(s): N17.9 - ACUTE KIDNEY FAILURE, UNSPECIFIED   





(4) Alcohol dependence with uncomplicated withdrawal


Assessment/Plan: 


no signs of withdrawal noted on the patient 


lorazepam 2mg IVP prn for agitation 


Code(s): F10.230 - ALCOHOL DEPENDENCE WITH WITHDRAWAL, UNCOMPLICATED   





(5) Hypertension


Assessment/Plan: 


c/w nifedipine 


will hold lisinopril in the setting of EMILIE 


will restart it when the patient is back to normal levels 


Code(s): I10 - ESSENTIAL (PRIMARY) HYPERTENSION   


Qualifiers: 


 





(6) Seizure disorder


Assessment/Plan: 


possible 2/2 to alcohol withdrawal 


will have IVP lorazepam 2mg PRN 


Code(s): G40.909 - EPILEPSY, UNSP, NOT INTRACTABLE, WITHOUT STATUS EPILEPTICUS 

  





(7) Urinary retention due to benign prostatic hyperplasia


Assessment/Plan: 


s/p velez cath


f/u with urology 


c/w monitoring creatinine level 


Code(s): N28.89 - OTHER SPECIFIED DISORDERS OF KIDNEY AND URETER; R33.8 - OTHER 

RETENTION OF URINE

## 2018-02-25 NOTE — PN
Progress Note, Physician


Chief Complaint: 





urinary retention





- Current Medication List


Current Medications: 


Active Medications





Acetaminophen (Tylenol -)  650 mg PO Q4H PRN


   PRN Reason: PAIN LEVEL 1-5


Diphenhydramine HCl (Benadryl -)  25 mg PO [facility]


   Last Admin: 02/24/18 22:50 Dose:  25 mg


Gabapentin (Neurontin -)  100 mg PO TID [facility]


   Last Admin: 02/25/18 06:56 Dose:  100 mg


Heparin Sodium (Porcine) (Heparin -)  5,000 unit SQ TID [facility]


   Last Admin: 02/25/18 06:56 Dose:  5,000 unit


Insulin Aspart (Novolog Vial Sliding Scale -)  1 vial SQ ACHS [facility]


   PRN Reason: Protocol


   Last Admin: 02/25/18 06:56 Dose:  2 unit


Insulin Detemir (Levemir Vial)  35 units SQ AM [facility]


   Last Admin: 02/25/18 06:56 Dose:  35 unit


Insulin Detemir (Levemir Vial)  10 units SQ HS [facility]


   Last Admin: 02/24/18 21:15 Dose:  10 unit


Lisinopril (Prinivil)  10 mg PO DAILY [facility]


   Last Admin: 02/24/18 09:10 Dose:  10 mg


Lorazepam (Ativan Injection -)  2 mg IVPUSH Q6H PRN


   PRN Reason: AGITATION


Nifedipine (Procardia Xl -)  60 mg PO DAILY [facility]


   Last Admin: 02/25/18 10:10 Dose:  60 mg


Quetiapine Fumarate (Seroquel -)  200 mg PO [facility]


   Last Admin: 02/24/18 22:50 Dose:  200 mg


Sitagliptin Phosphate (Januvia -)  50 mg PO DAILY@0700 [facility]


   Last Admin: 02/25/18 06:56 Dose:  50 mg


Trimethoprim/Sulfamethoxazole (Bactrim Ds -)  1 each PO BID [facility]











- Objective


Vital Signs: 


 Vital Signs











Temperature  97.9 F   02/25/18 10:14


 


Pulse Rate  103 H  02/25/18 10:14


 


Respiratory Rate  18   02/25/18 10:14


 


Blood Pressure  152/86   02/25/18 10:14


 


O2 Sat by Pulse Oximetry (%)  97   02/24/18 21:00











Constitutional: Yes: Well Nourished, No Distress, Calm


Eyes: Yes: WNL, Conjunctiva Clear, EOM Intact


HENT: Yes: WNL, Atraumatic, Normocephalic


Neck: Yes: WNL


Cardiovascular: Yes: WNL, Regular Rate and Rhythm


Respiratory: Yes: WNL


Gastrointestinal: Yes: WNL


...Rectal Exam: Yes: Deferred


Genitourinary: Yes: WNL


Labs: 


 CBC, BMP





 02/25/18 06:00 





 02/25/18 06:00 











Problem List





- Problems


(1) Hyperglycemia


Assessment/Plan: 


c/w sitaglipitn 


c/w insulin sliding scale 


DMII diet 


Code(s): R73.9 - HYPERGLYCEMIA, UNSPECIFIED   





(2) UTI (urinary tract infection)


Assessment/Plan: 


patient with BPH and acute urinary retention with EMILIE 


d/c ceftriaxone will start the patient on bactrim DS q12hrs 


monitor K+ level as the drug tend to increase the K+ level if it interfere with 

the clearance of the K+, also it might falsely elevate the level of Cr. 


patient is stable to be switched to PO 


FLUROQUNILONES would interfere with seroquel as both drugs can can prolong QT 

thus increasing the chances of torsades 





Code(s): N39.0 - URINARY TRACT INFECTION, SITE NOT SPECIFIED   


Qualifiers: 


   Urinary tract infection type: site unspecified   Hematuria presence: without 

hematuria   Qualified Code(s): N39.0 - Urinary tract infection, site not 

specified   





(3) EMILIE (acute kidney injury)


Assessment/Plan: 


EMILIE post-renal obstruction s/p velez catheter placement 


f/u with urology 


c/w antibiotivs for 7 days due to complicated UTI 


Code(s): N17.9 - ACUTE KIDNEY FAILURE, UNSPECIFIED   





(4) Alcohol dependence with uncomplicated withdrawal


Assessment/Plan: 


no signs of withdrawal noted on the patient 


lorazepam 2mg IVP prn for agitation 


Code(s): F10.230 - ALCOHOL DEPENDENCE WITH WITHDRAWAL, UNCOMPLICATED   





(5) Hypertension


Assessment/Plan: 


c/w nifedipine 


will hold lisinopril in the setting of EMILIE 


will restart it when the patient is back to normal levels 


Code(s): I10 - ESSENTIAL (PRIMARY) HYPERTENSION   


Qualifiers: 


 





(6) Seizure disorder


Assessment/Plan: 


possible 2/2 to alcohol withdrawal 


will have IVP lorazepam 2mg PRN 


Code(s): G40.909 - EPILEPSY, UNSP, NOT INTRACTABLE, WITHOUT STATUS EPILEPTICUS 

  





(7) Urinary retention due to benign prostatic hyperplasia


Assessment/Plan: 


s/p velez cath


f/u with urology 


c/w monitoring creatinine level 


Code(s): N28.89 - OTHER SPECIFIED DISORDERS OF KIDNEY AND URETER; R33.8 - OTHER 

RETENTION OF URINE

## 2018-02-26 NOTE — PN
Progress Note, Physician





- Current Medication List


Current Medications: 


Active Medications





Acetaminophen (Tylenol -)  650 mg PO Q4H PRN


   PRN Reason: PAIN LEVEL 1-5


Diphenhydramine HCl (Benadryl -)  50 mg PO HS PRN


   PRN Reason: INSOMNIA


   Last Admin: 02/25/18 23:02 Dose:  50 mg


Gabapentin (Neurontin -)  100 mg PO TID [facility]


   Last Admin: 02/26/18 07:08 Dose:  100 mg


Heparin Sodium (Porcine) (Heparin -)  5,000 unit SQ TID [facility]


   Last Admin: 02/26/18 07:08 Dose:  5,000 unit


Insulin Aspart (Novolog Vial Sliding Scale -)  1 vial SQ ACHS [facility]


   PRN Reason: Protocol


   Last Admin: 02/26/18 07:09 Dose:  Not Given


Insulin Detemir (Levemir Vial)  35 units SQ AM [facility]


   Last Admin: 02/26/18 07:09 Dose:  35 unit


Insulin Detemir (Levemir Vial)  10 units SQ HS [facility]


   Last Admin: 02/25/18 23:04 Dose:  10 unit


Lisinopril (Prinivil)  10 mg PO DAILY [facility]


   Last Admin: 02/24/18 09:10 Dose:  10 mg


Lorazepam (Ativan Injection -)  2 mg IVPUSH Q6H PRN


   PRN Reason: AGITATION


Nifedipine (Procardia Xl -)  60 mg PO DAILY [facility]


   Last Admin: 02/25/18 10:10 Dose:  60 mg


Quetiapine Fumarate (Seroquel -)  200 mg PO HS [facility]


   Last Admin: 02/25/18 23:03 Dose:  200 mg


Sitagliptin Phosphate (Januvia -)  50 mg PO DAILY@0700 [facility]


   Last Admin: 02/26/18 07:08 Dose:  50 mg


Trimethoprim/Sulfamethoxazole (Bactrim Ds -)  1 each PO BID [facility]


   Last Admin: 02/25/18 23:02 Dose:  1 each











- Objective


Vital Signs: 


 Vital Signs











Temperature  98.3 F   02/26/18 06:00


 


Pulse Rate  91 H  02/26/18 06:00


 


Respiratory Rate  16   02/26/18 06:00


 


Blood Pressure  150/91   02/26/18 06:00


 


O2 Sat by Pulse Oximetry (%)  97   02/25/18 21:00











Cardiovascular: Yes: Regular Rate and Rhythm


Respiratory: Yes: Regular, CTA Bilaterally


Gastrointestinal: Yes: Normal Bowel Sounds, Soft


Labs: 


 CBC, BMP





 02/25/18 06:00 





 02/25/18 06:00 











Assessment/Plan





- Problems


(1) Hyperglycemia


Assessment/Plan: 


c/w sitaglipitn 


c/w insulin sliding scale 


DMII diet 


Code(s): R73.9 - HYPERGLYCEMIA, UNSPECIFIED   





(2) UTI (urinary tract infection)


Assessment/Plan: 


patient with BPH and acute urinary retention with EMILIE 


d/c ceftriaxone will start the patient on bactrim DS q12hrs 


monitor K+ level as the drug tend to increase the K+ level if it interfere with 

the clearance of the K+, also it might falsely elevate the level of Cr. 


patient is stable to be switched to PO 


FLUROQUNILONES would interfere with seroquel as both drugs can can prolong QT 

thus increasing the chances of torsades 





Code(s): N39.0 - URINARY TRACT INFECTION, SITE NOT SPECIFIED   


Qualifiers: 


   Urinary tract infection type: site unspecified   Hematuria presence: without 

hematuria   Qualified Code(s): N39.0 - Urinary tract infection, site not 

specified   





(3) EMILIE (acute kidney injury)


Assessment/Plan: 


EMILIE post-renal obstruction s/p velez catheter placement 


f/u with urology 


c/w antibiotivs for 7 days due to complicated UTI 


Code(s): N17.9 - ACUTE KIDNEY FAILURE, UNSPECIFIED   





(4) Alcohol dependence with uncomplicated withdrawal


Assessment/Plan: 


no signs of withdrawal noted on the patient 


lorazepam 2mg IVP prn for agitation 


Code(s): F10.230 - ALCOHOL DEPENDENCE WITH WITHDRAWAL, UNCOMPLICATED   





(5) Hypertension


Assessment/Plan: 


c/w nifedipine 


will hold lisinopril in the setting of EMILIE 


will restart it when the patient is back to normal levels 


Code(s): I10 - ESSENTIAL (PRIMARY) HYPERTENSION   


Qualifiers: 


 





(6) Seizure disorder


Assessment/Plan: 


possible 2/2 to alcohol withdrawal 


will have IVP lorazepam 2mg PRN 


Code(s): G40.909 - EPILEPSY, UNSP, NOT INTRACTABLE, WITHOUT STATUS EPILEPTICUS 

  





(7) Urinary retention due to benign prostatic hyperplasia


Assessment/Plan: 


s/p velez cath


f/u with urology 


c/w monitoring creatinine level 


satrt flomax


trial of voiding


Code(s): N28.89 - OTHER SPECIFIED DISORDERS OF KIDNEY AND URETER; R33.8 - OTHER 

RETENTION OF URINE

## 2018-02-27 NOTE — EKG
Test Reason : 

Blood Pressure : ***/*** mmHG

Vent. Rate : 106 BPM     Atrial Rate : 106 BPM

   P-R Int : 176 ms          QRS Dur : 096 ms

    QT Int : 346 ms       P-R-T Axes : [phone] degrees

   QTc Int : 459 ms

 

SINUS TACHYCARDIA

OTHERWISE NORMAL ECG

WHEN COMPARED WITH ECG OF 28-JUL-2017 08:58,

VENT. RATE HAS INCREASED BY  37 BPM

Confirmed by MD Edwards Daniel ([id number]) on 2/27/2018 1:31:30 PM

 

Referred By:             Confirmed By:Damaso Edwards MD

## 2018-02-27 NOTE — DS
Physical Examination


Vital Signs: 


 Vital Signs











Temperature  98.4 F   02/27/18 06:11


 


Pulse Rate  88   02/27/18 06:11


 


Respiratory Rate  18   02/27/18 06:11


 


Blood Pressure  104/59   02/27/18 06:11


 


O2 Sat by Pulse Oximetry (%)  99   02/26/18 21:00











Cardiovascular: Yes: Regular Rate and Rhythm


Respiratory: Yes: Regular, CTA Bilaterally


Gastrointestinal: Yes: Normal Bowel Sounds, Soft.  No: Tenderness


Labs: 


 CBC, BMP





 02/25/18 06:00 





 02/25/18 06:00 











Discharge Summary


Reason For Visit: UTI; HYPERGLYCEMIA; RETENTION OF URINE


Current Active Problems





Hyperglycemia (Acute)


UTI (urinary tract infection) (Acute)


Urinary retention (Acute)


Weakness (Acute)








Hospital Course: 





The patient is a 61 year old male with a significant PMH of HTN, HLD, DM II, 

seizures, hx of alcohol abuse. Per eval in ED, pt states he came to the ED with 

R lower back pain and incontinence. Per ED report the patient has been 

progressively weak in the past month with increased falls. The patient does 

endorse he doesn't like the hospital. Today, he complains of lower L back pain. 

He accepts velez insertion.


Denies nausea, vomiting, fever, chills, cp, sob. 


History Source: Patient, Medical Record


Limitations to Obtaining History: No Limitations





- Past Medical History


Cardiovascular: Yes: HTN


Psych: Yes: Addictions


Endocrine: Yes: Diabetes Mellitus





- Problems


(1) Hyperglycemia


Assessment/Plan: 


c/w sitaglipitn 


c/w insulin sliding scale 


DMII diet 


Code(s): R73.9 - HYPERGLYCEMIA, UNSPECIFIED   





(2) UTI (urinary tract infection)


Assessment/Plan: 


patient with BPH and acute urinary retention with EMILIE --improved velez out


d/c ceftriaxone will start the patient on bactrim DS q12hrs 


monitor K+ level as the drug tend to increase the K+ level if it interfere with 

the clearance of the K+, also it might falsely elevate the level of Cr. 


patient is stable to be switched to PO 


FLUROQUNILONES would interfere with seroquel as both drugs can can prolong QT 

thus increasing the chances of torsades 





Code(s): N39.0 - URINARY TRACT INFECTION, SITE NOT SPECIFIED   


Qualifiers: 


   Urinary tract infection type: site unspecified   Hematuria presence: without 

hematuria   Qualified Code(s): N39.0 - Urinary tract infection, site not 

specified   





(3) EMILIE (acute kidney injury)


Assessment/Plan: 


EMILIE post-renal obstruction s/p velez catheter placement --velez dc--urinating 

on flomax


f/u with urology 


c/w antibiotivs for 7 days due to complicated UTI 


Code(s): N17.9 - ACUTE KIDNEY FAILURE, UNSPECIFIED   





(4) Alcohol dependence with uncomplicated withdrawal


Assessment/Plan: 


no signs of withdrawal noted on the patient 


lorazepam 2mg IVP prn for agitation 


Code(s): F10.230 - ALCOHOL DEPENDENCE WITH WITHDRAWAL, UNCOMPLICATED   





(5) Hypertension


Assessment/Plan: 


c/w nifedipine 


will hold lisinopril in the setting of EMILIE 


will restart it when the patient is back to normal levels 


Code(s): I10 - ESSENTIAL (PRIMARY) HYPERTENSION   


Qualifiers: 


 





(6) Seizure disorder


Assessment/Plan: 


possible 2/2 to alcohol withdrawal 


will have IVP lorazepam 2mg PRN 


Code(s): G40.909 - EPILEPSY, UNSP, NOT INTRACTABLE, WITHOUT STATUS EPILEPTICUS 

  





(7) Urinary retention due to benign prostatic hyperplasia


Assessment/Plan: 


s/p velez cath--removed--urinating


on flomax


f/u with urology this week


c/w monitoring creatinine level 


satrt flomax


trial of voiding--pt states flow is good--bladder scan


Code(s): N28.89 - OTHER SPECIFIED DISORDERS OF KIDNEY AND URETER; R33.8 - OTHER 

RETENTION OF URINE   








Condition: Improved





- Instructions


Referrals: 


Milena Vang MD [Primary Care Provider] - 1 Week


Jose Manuel Tong MD [Staff Physician] - 02/27/18


Disposition: HOME





- Home Medications


Comprehensive Discharge Medication List: 


Ambulatory Orders





Empagliflozin [Jardiance] 25 mg PO DAILY 11/15/16 


Nifedipine [Adalat cc] 60 mg PO DAILY 11/15/16 


Gabapentin [Neurontin -] 100 mg PO TID #90 capsule 01/27/17 


Insulin (Levemir) [Levemir Vial] 35 units SQ AM  ml 01/27/17 


Lisinopril [Prinivil] 5 mg PO DAILY #30 tablet 01/27/17 


Quetiapine Fumarate [Seroquel] 200 mg PO HS 02/22/18 


Insulin (Levemir) [Levemir Vial] 10 units SQ HS  ml 02/26/18 


Sulfamethoxazole/Trimethoprim [Bactrim DS -] 1 each PO BID #10 tablet 02/26/18 


Tamsulosin HCl [Flomax -] 0.4 mg PO DAILY@0830 #30 cap.er.24h 02/26/18

## 2018-03-18 NOTE — CON.NEURO
Consult


Consult Specialty:: Neurology


Referred by:: RAFY Stewart


Reason for Consultation:: Altered Mentation





- History of Present Illness


Chief Complaint: Found Unresponsive in Home





- History Source


History Provided By: Medical Record


Limitations to Obtaining History: Clinical Condition





- Past Medical History


CNS: Yes: Seizure


Cardio/Vascular: Yes: HTN, Hyperlipdemia


Psych: Yes: Addictions


Endocrine: Yes: Diabetes Mellitus


Additional Medical History: falls -> neck pain





- Alcohol/Substance Use


Hx Alcohol Use: No





- Smoking History


Smoking history: Unknown if ever smoked


Have you smoked in the past 12 months: No


Aproximately how many cigarettes per day: 0





- Social History


Usual Living Arrangement: With Spouse





Home Medications





- Allergies


Allergies/Adverse Reactions: 


 Allergies











Allergy/AdvReac Type Severity Reaction Status Date / Time


 


No Known Allergies Allergy   Verified 11/26/17 02:04














- Home Medications


Home Medications: 


Ambulatory Orders





Nifedipine [Adalat cc] 60 mg PO DAILY 11/15/16 


Gabapentin [Neurontin -] 100 mg PO TID #90 capsule 01/27/17 


Insulin (Levemir) [Levemir Vial] 35 units SQ AM  ml 01/27/17 


Lisinopril [Prinivil] 5 mg PO DAILY #30 tablet 01/27/17 


Quetiapine Fumarate [Seroquel] 200 mg PO HS 02/22/18 


Insulin (Levemir) [Levemir Vial] 10 units SQ HS  ml 02/26/18 


Tamsulosin HCl [Flomax -] 0.4 mg PO DAILY@0830 #30 cap.er.24h 02/26/18 


Atorvastatin Ca [Lipitor] 10 mg PO HS 03/18/18 


Cholecalciferol (Vitamin D3) [Vitamin D3 -] 400 unit PO DAILY 03/18/18 


Citalopram Hydrobromide [Citalopram HBr] 10 mg PO DAILY 03/18/18 


Pantoprazole Sodium [Protonix -] 20 mg PO DAILY 03/18/18 


Thiamine HCl 50 mg PO DAILY 03/18/18 











Physical Exam-Neuro


Vital Signs: 


 Vital Signs











Temperature  94 F L  03/18/18 16:01


 


Pulse Rate  104 H  03/18/18 17:06


 


Respiratory Rate  16   03/18/18 17:06


 


Blood Pressure  130/88   03/18/18 17:06


 


O2 Sat by Pulse Oximetry (%)  100   03/18/18 16:01











Constitutional: Yes: Poor Hygeine


Neck: Yes: WNL, Supple


Labs: 


 CBC, BMP





 03/18/18 15:52 





 03/18/18 15:52 





 INR, PTT











INR  1.08  (0.82-1.09)   03/18/18  15:52    














- Neuro Exam


Level Of Consciousness: Yes: Obtunded (Patient lethargic, tends to go back to 

sleep, though increasingly awake as I examined him)


Eyes: Yes: ALVIN


Speech: Slurred (difficult to understand much of what he says, partially chewed 

food in mouth)


Cranial Nerves II-XII Intact: Yes


DTR's: 1+ Left Achilles, 1+ Right Achilles, 2+ Left Bicep, 2+ Right Bicep, 2+ 

Left Tricep, 2+ Right Tricep, 2+ Left Brachioradialis, 2+ Right Brachioradialis


Babinski: Absent


Motor Strength: 4/5: Left Arm, Right Arm, Left Leg, Right Leg (moves all limbs 

well, but cooperation with full strength limited)


Gait: Deferred





Imaging





- Results


Cat Scan: Report Reviewed, Image Reviewed (no acute pathology)





Problem List





- Problems


(1) Seizure


Code(s): R56.9 - UNSPECIFIED CONVULSIONS   





(2) Alcoholic liver disease


Code(s): K70.9 - ALCOHOLIC LIVER DISEASE, UNSPECIFIED   





(3) Delirium


Code(s): R41.0 - DISORIENTATION, UNSPECIFIED   





Assessment/Plan


Patient found in home unresponsive, hypothermic with gradual improvement in 

alertness.  No focal deficits and no evidence of structural abnormality on CT 

head.  he may be postictal, or may have overdosed on medication.  In any case, 

I would recommend against feeding him until he is more alert as he may be 

aspiration risk at this time. Resume medication and once he is more alert he 

should also be questioned about possible medication indiscretions or possible 

suicidal ideation.

## 2018-03-18 NOTE — HP
Admitting History and Physical





- Primary Care Physician


PCP: Marsha Hernandez





- Admission


Chief Complaint: Unresponsive, Hypothermia, Post Tictal


History of Present Illness: 





61 y/o man with Seizure Disorder, HTN, HLD, Alcohol Abuse. BIBa found 

unresponsive. On arrival patient was hypothermic 94.0


Patient now responsive requesting to leave. Patient is alert to name, , place

, month, year. Patient unsure of name of meds, but reports taking his 

medications today. Patient reports having a productive cough, chest congestion. 

Patient reports being seen at recently for Urinary Retention- BPH.


Patient denies recent alcohol or illicit drug use. Patient denies fever, SOB, CP

, palpitations, AP, N/V/D.








History Source: Patient, Medical Record


Limitations to Obtaining History: Poor Historian





- Past Medical History


CNS: Yes: Seizure


Cardiovascular: Yes: HTN, Hyperlipdemia


Renal/: Yes: BPH


Psych: Yes: Addictions


Endocrine: Yes: Diabetes Mellitus





- Smoking History


Smoking history: Unknown if ever smoked


Have you smoked in the past 12 months: No


Aproximately how many cigarettes per day: 0





- Alcohol/Substance Use


Hx Alcohol Use: No


History of Substance Use: reports: None





- Social History


Usual Living Arrangement: Yes: With Spouse


History of Recent Travel: No





Home Medications





- Allergies


Allergies/Adverse Reactions: 


 Allergies











Allergy/AdvReac Type Severity Reaction Status Date / Time


 


No Known Allergies Allergy   Verified 17 02:04














- Home Medications


Home Medications: 


Ambulatory Orders





Nifedipine [Adalat cc] 60 mg PO DAILY 11/15/16 


Gabapentin [Neurontin -] 100 mg PO TID #90 capsule 17 


Insulin (Levemir) [Levemir Vial] 35 units SQ AM  ml 17 


Lisinopril [Prinivil] 5 mg PO DAILY #30 tablet 17 


Quetiapine Fumarate [Seroquel] 200 mg PO HS 18 


Insulin (Levemir) [Levemir Vial] 10 units SQ HS  ml 18 


Tamsulosin HCl [Flomax -] 0.4 mg PO DAILY@0830 #30 cap.er.24h 18 


Atorvastatin Ca [Lipitor] 10 mg PO HS 18 


Cholecalciferol (Vitamin D3) [Vitamin D3 -] 400 unit PO DAILY 18 


Citalopram Hydrobromide [Citalopram HBr] 10 mg PO DAILY 18 


Pantoprazole Sodium [Protonix -] 20 mg PO DAILY 18 


Thiamine HCl 50 mg PO DAILY 18 











Family Disease History





- Family Disease History


Family History: Unable to Obtain





Review of Systems





- Review of Systems


Constitutional: reports: Chills


Eyes: reports: No Symptoms


HENT: reports: Throat Pain


Neck: reports: No Symptoms


Cardiovascular: reports: No Symptoms


Respiratory: reports: No Symptoms


Gastrointestinal: reports: No Symptoms


Genitourinary: reports: No Symptoms


Breasts: reports: No Symptoms Reported


Musculoskeletal: reports: No Symptoms


Integumentary: reports: No Symptoms


Neurological: reports: Confusion, Tremors


Endocrine: reports: No Symptoms


Hematology/Lymphatic: reports: No Symptoms


Psychiatric: reports: No Symptoms





Physical Examination


Vital Signs: 


 Vital Signs











Temperature  97.3 F L  18 18:26


 


Pulse Rate  122 H  18 19:20


 


Respiratory Rate  18   18 19:20


 


Blood Pressure  150/87   18 19:20


 


O2 Sat by Pulse Oximetry (%)  97   18 19:20











Constitutional: Yes: Anxious, Poor Hygeine


Eyes: Yes: WNL, Conjunctiva Clear, PERRL


HENT: Yes: WNL, Atraumatic, Normocephalic


Neck: Yes: WNL, Supple, Trachea Midline


Cardiovascular: Yes: Tachycardia, S1, S2


Respiratory: Yes: On Nasal O2, Rhonchi


Gastrointestinal: Yes: Soft, Abdomen, Obese, Hypoactive Bowel Sounds


Renal/: Yes: WNL


Breast(s): Yes: WNL


Musculoskeletal: Yes: WNL


Extremities: Yes: WNL


Edema: Yes


Edema: LLE: 1+, RLE: 2+


Peripheral Pulses WNL: Yes


Neurological: Yes: Alert, Oriented, Dysarthria (slurred speech- at baseline), 

Tremors, Unsteady Gait


...Motor Strength: WNL (4/5)


Psychiatric: Yes: Alert, Oriented


Labs: 


 CBC, BMP





 18 15:52 





 18 15:52 





 Laboratory Results - last 24 hr











  18





  15:50 15:52 15:52


 


WBC   10.3 H D 


 


RBC   3.77 L 


 


Hgb   12.0 


 


Hct   35.2 L 


 


MCV   93.4 


 


MCH   31.9 


 


MCHC   34.1 


 


RDW   13.6 


 


Plt Count   167 


 


MPV   8.9 


 


Neutrophils %   80.6  D 


 


Lymphocytes %   9.8  D 


 


Monocytes %   8.5 


 


Eosinophils %   1.0 


 


Basophils %   0.1 


 


PT with INR    12.20 H


 


INR    1.08


 


VBG pH   


 


POC VBG pCO2   


 


POC VBG pO2   


 


Mixed VBG HCO3   


 


Sodium   


 


Potassium   


 


Chloride   


 


Carbon Dioxide   


 


Anion Gap   


 


BUN   


 


Creatinine   


 


Creat Clearance w eGFR   


 


POC Glucometer  [phone]  


 


Random Glucose   


 


Lactic Acid   


 


Calcium   


 


Total Bilirubin   


 


AST   


 


ALT   


 


Alkaline Phosphatase   


 


Ammonia   


 


Creatine Kinase   


 


Creatine Kinase Index   


 


CK-MB (CK-2)   


 


Troponin I   


 


Total Protein   


 


Albumin   


 


Triglycerides   


 


Cholesterol   


 


Total LDL Cholesterol   


 


HDL Cholesterol   


 


Urine Color   


 


Urine Appearance   


 


Urine pH   


 


Ur Specific Gravity   


 


Urine Protein   


 


Urine Glucose (UA)   


 


Urine Ketones   


 


Urine Blood   


 


Urine Nitrite   


 


Urine Bilirubin   


 


Urine Urobilinogen   


 


Ur Leukocyte Esterase   


 


Salicylates   


 


Opiates Screen   


 


Methadone Screen   


 


Acetaminophen   


 


Barbiturate Screen   


 


Phencyclidine Screen   


 


Ur Amphetamines Screen   


 


MDMA (Ecstasy) Screen   


 


Benzodiazepines Screen   


 


Cocaine Screen   


 


U Marijuana (THC) Screen   


 


Alcohol, Quantitative   


 


Acetone, Qual   


 


Blood Type   


 


Antibody Screen   














  18





  15:52 15:52 15:52


 


WBC   


 


RBC   


 


Hgb   


 


Hct   


 


MCV   


 


MCH   


 


MCHC   


 


RDW   


 


Plt Count   


 


MPV   


 


Neutrophils %   


 


Lymphocytes %   


 


Monocytes %   


 


Eosinophils %   


 


Basophils %   


 


PT with INR   


 


INR   


 


VBG pH   


 


POC VBG pCO2   


 


POC VBG pO2   


 


Mixed VBG HCO3   


 


Sodium   138 


 


Potassium   4.2 


 


Chloride   104 


 


Carbon Dioxide   27 


 


Anion Gap   7 L 


 


BUN   27 H D 


 


Creatinine   1.6 H D 


 


Creat Clearance w eGFR   44.02 


 


POC Glucometer   


 


Random Glucose   226 H D 


 


Lactic Acid   


 


Calcium   8.9 


 


Total Bilirubin   0.3  D 


 


AST   18  D 


 


ALT   30  D 


 


Alkaline Phosphatase   126 H 


 


Ammonia   


 


Creatine Kinase   181 


 


Creatine Kinase Index   3.2 


 


CK-MB (CK-2)   5.901 H 


 


Troponin I   < 0.02 


 


Total Protein   7.5 


 


Albumin   3.9 


 


Triglycerides   205 H D 


 


Cholesterol   171 


 


Total LDL Cholesterol   92 


 


HDL Cholesterol   43 


 


Urine Color  Yellow  


 


Urine Appearance  Clear  


 


Urine pH  5.0  


 


Ur Specific Gravity  1.015  


 


Urine Protein  Negative  


 


Urine Glucose (UA)  3+ H  


 


Urine Ketones  Negative  


 


Urine Blood  Negative  


 


Urine Nitrite  Negative  


 


Urine Bilirubin  Negative  


 


Urine Urobilinogen  Negative  


 


Ur Leukocyte Esterase  Negative  


 


Salicylates   


 


Opiates Screen   


 


Methadone Screen   


 


Acetaminophen   


 


Barbiturate Screen   


 


Phencyclidine Screen   


 


Ur Amphetamines Screen   


 


MDMA (Ecstasy) Screen   


 


Benzodiazepines Screen   


 


Cocaine Screen   


 


U Marijuana (THC) Screen   


 


Alcohol, Quantitative   


 


Acetone, Qual   


 


Blood Type    O POSITIVE


 


Antibody Screen    Negative














  18





  15:52 15:52 15:52


 


WBC   


 


RBC   


 


Hgb   


 


Hct   


 


MCV   


 


MCH   


 


MCHC   


 


RDW   


 


Plt Count   


 


MPV   


 


Neutrophils %   


 


Lymphocytes %   


 


Monocytes %   


 


Eosinophils %   


 


Basophils %   


 


PT with INR   


 


INR   


 


VBG pH   


 


POC VBG pCO2   


 


POC VBG pO2   


 


Mixed VBG HCO3   


 


Sodium   


 


Potassium   


 


Chloride   


 


Carbon Dioxide   


 


Anion Gap   


 


BUN   


 


Creatinine   


 


Creat Clearance w eGFR   


 


POC Glucometer   


 


Random Glucose   


 


Lactic Acid    1.2


 


Calcium   


 


Total Bilirubin   


 


AST   


 


ALT   


 


Alkaline Phosphatase   


 


Ammonia  18.38  


 


Creatine Kinase   


 


Creatine Kinase Index   


 


CK-MB (CK-2)   


 


Troponin I   


 


Total Protein   


 


Albumin   


 


Triglycerides   


 


Cholesterol   


 


Total LDL Cholesterol   


 


HDL Cholesterol   


 


Urine Color   


 


Urine Appearance   


 


Urine pH   


 


Ur Specific Gravity   


 


Urine Protein   


 


Urine Glucose (UA)   


 


Urine Ketones   


 


Urine Blood   


 


Urine Nitrite   


 


Urine Bilirubin   


 


Urine Urobilinogen   


 


Ur Leukocyte Esterase   


 


Salicylates   < 1.700 


 


Opiates Screen   


 


Methadone Screen   


 


Acetaminophen   < 2.000 


 


Barbiturate Screen   


 


Phencyclidine Screen   


 


Ur Amphetamines Screen   


 


MDMA (Ecstasy) Screen   


 


Benzodiazepines Screen   


 


Cocaine Screen   


 


U Marijuana (THC) Screen   


 


Alcohol, Quantitative   < 5.0 


 


Acetone, Qual   Negative 


 


Blood Type   


 


Antibody Screen   














  18





  16:00 18:16 22:30


 


WBC   


 


RBC   


 


Hgb   


 


Hct   


 


MCV   


 


MCH   


 


MCHC   


 


RDW   


 


Plt Count   


 


MPV   


 


Neutrophils %   


 


Lymphocytes %   


 


Monocytes %   


 


Eosinophils %   


 


Basophils %   


 


PT with INR   


 


INR   


 


VBG pH  7.29 L  


 


POC VBG pCO2  52.7 H  


 


POC VBG pO2  37.5  


 


Mixed VBG HCO3  24.7  


 


Sodium   


 


Potassium   


 


Chloride   


 


Carbon Dioxide   


 


Anion Gap   


 


BUN   


 


Creatinine   


 


Creat Clearance w eGFR   


 


POC Glucometer   


 


Random Glucose   


 


Lactic Acid   


 


Calcium   


 


Total Bilirubin   


 


AST   


 


ALT   


 


Alkaline Phosphatase   


 


Ammonia   


 


Creatine Kinase   


 


Creatine Kinase Index   


 


CK-MB (CK-2)   


 


Troponin I    < 0.02


 


Total Protein   


 


Albumin   


 


Triglycerides   


 


Cholesterol   


 


Total LDL Cholesterol   


 


HDL Cholesterol   


 


Urine Color   


 


Urine Appearance   


 


Urine pH   


 


Ur Specific Gravity   


 


Urine Protein   


 


Urine Glucose (UA)   


 


Urine Ketones   


 


Urine Blood   


 


Urine Nitrite   


 


Urine Bilirubin   


 


Urine Urobilinogen   


 


Ur Leukocyte Esterase   


 


Salicylates   


 


Opiates Screen   Negative 


 


Methadone Screen   Negative 


 


Acetaminophen   


 


Barbiturate Screen   Negative 


 


Phencyclidine Screen   Negative 


 


Ur Amphetamines Screen   Negative 


 


MDMA (Ecstasy) Screen   Negative 


 


Benzodiazepines Screen   Positive 


 


Cocaine Screen   Negative 


 


U Marijuana (THC) Screen   Negative 


 


Alcohol, Quantitative   


 


Acetone, Qual   


 


Blood Type   


 


Antibody Screen   














Imaging





- Results


Chest X-ray: Image Reviewed


Cat Scan: Report Reviewed, Image Reviewed





Problem List





- Problems


(1) Seizure


Code(s): R56.9 - UNSPECIFIED CONVULSIONS   





(2) Encephalopathy acute


Code(s): G93.40 - ENCEPHALOPATHY, UNSPECIFIED   





(3) EMILIE (acute kidney injury)


Code(s): N17.9 - ACUTE KIDNEY FAILURE, UNSPECIFIED   





(4) Alcohol dependence with uncomplicated withdrawal


Code(s): F10.230 - ALCOHOL DEPENDENCE WITH WITHDRAWAL, UNCOMPLICATED   





(5) Alcoholic liver disease


Code(s): K70.9 - ALCOHOLIC LIVER DISEASE, UNSPECIFIED   





(6) Asterixis


Code(s): R27.8 - OTHER LACK OF COORDINATION   





(7) Depression


Code(s): F32.9 - MAJOR DEPRESSIVE DISORDER, SINGLE EPISODE, UNSPECIFIED   





(8) Diabetes


Code(s): E11.9 - TYPE 2 DIABETES MELLITUS WITHOUT COMPLICATIONS   


Qualifiers: 


   Diabetes mellitus type: type 2 





(9) GERD (gastroesophageal reflux disease)


Code(s): K21.9 - GASTRO-ESOPHAGEAL REFLUX DISEASE WITHOUT ESOPHAGITIS   


Qualifiers: 


   Esophagitis presence: esophagitis presence not specified   Qualified Code(s)

: K21.9 - Gastro-esophageal reflux disease without esophagitis   





(10) Hypertension


Code(s): I10 - ESSENTIAL (PRIMARY) HYPERTENSION   


Qualifiers: 


 





(11) Sedative, hypnotic or anxiolytic dependence with withdrawal, uncomplicated


Code(s): F13.230 - SEDATV/HYP/ANXIOLYTC DEPENDENCE W WITHDRAWAL, UNCOMPLICATED 

  





(12) Delirium


Code(s): R41.0 - DISORIENTATION, UNSPECIFIED   





(13) DVT prophylaxis


Code(s): [id number] -    





Assessment/Plan





This is a 61 y/o man with a PMHx of:  Seizures, HTN, HLD, DM, Alcohol Abuse, 

Urinary Retention. Admitted to Telemetry for Seizures, Hypothermia, Tachycardia





Plan:


1. Seizure- Likely secondary to non-compliance vs drug use vs metabolic. 

Continue cardiac monitoring, seizure precautions, Neurology following, CT Head- 

neg ICH, chronic microvascular changes, UDT- +benzodiazepine, ETOH- < 5, repeat 

CBC, BMP, seizure precautions, fall precautions.


2. Hypothermia-  now corrected, ?Sepsis, Blood cultures, Urine culture-pending, 

WBC mildly elevated, give warmed blankets in ED- temp now 97.6. monitor vitals


3. Sinus Tachycardia- likely due to infection vs arrhythmia vs PE, cardiac 

monitoring serial enzymes, Appreciate Cardiology consult, Wells Score 1.5, IVF 

given in ED, continue gentle IVF- concern for fluid overload. 


4. Acute Toxic Encephalopathy (ATE)- Likely secondary to medication vs metabolic

, +somnolence, +unsteady gait, +tremors, Ammonia 18, Fall Precautions, PT- gait 

strengthening, monitor CBC, BMP, neuro checks, Neuro following


5. Lower Extremity Edema- Lasix, duplex b/l LLE  r/o DVT, elevate extremities, 

strict INOs, daily weights


6. HTN- stable, monitor BP, continue home meds, monitor renal function


7. Diabetes Mellitus- stable, BGMs, ISS, continue Levemir once meals resumed


8. Hyperlipidemia- stable- resume home med


9. BPH- continue Flomax


10. Alcohol Abuse- continue Thiamine, Folic Acid, MVI


11. FEN- NS@42cc/hr, Replete lytes prn, NPO-tonight 


12. DVT ppx- SCDs, Heparin monitor for thrombocytopenia





Code Status: Full Code





Dispo: Requires Inpatient Care











Visit type





- Emergency Visit


Emergency Visit: Yes


ED Registration Date: 18


Care time: The patient presented to the Emergency Department on the above date 

and was hospitalized for further evaluation of their emergent condition.





- New Patient


This patient is new to me today: Yes


Date on this admission: 18





- Critical Care


Critical Care patient: No





Hospitalist Screening





- Colonoscopy Questionnaire


Colonoscopy Questionnaire: 





Colonoscopy Questionnaire








-   Patient:


50 - 75 years old and never had a screening colonoscopy: Unknown


History of colon or rectal polyps, or CA: Unknown


History of IBD, Crohn's disease or UC: Unknown


History of abdominal radiation therapy as a child: Unknown





-   Relative:


1 with colon or rectal CA, or polyps at age 60 or younger: Unknown


Colon or rectal CA diagnosed at age 45 or younger: Unknown


Multiple relatives with colon or rectal CA: Unknown





-   Outcome:


Screening Result: Negative Screen

## 2018-03-19 NOTE — DS
Physical Examination


Vital Signs: 


 Vital Signs











Temperature  98.9 F   03/19/18 01:42


 


Pulse Rate  113 H  03/19/18 10:30


 


Respiratory Rate  18   03/19/18 10:30


 


Blood Pressure  121/86   03/19/18 10:30


 


O2 Sat by Pulse Oximetry (%)  98   03/19/18 10:30











Midde aged man walking comfortably  wants to go home





HEENT: Mm moist, no anemia, PERRLA EOMI





NECK: No JVD No Bruit





CHEST: CTA B/L





CVS; S1S2 R





ABD: No distention, non tender





EXT: No Jeremiah afeet, no calf tenderness





CNS: AOX3 grossy non focal 


Labs: 


 CBC, BMP





 03/19/18 05:55 





 03/19/18 05:55 











Discharge Summary


Reason For Visit: SEIZURE


Current Active Problems





Seizure (Acute)








Hospital Course: 


62man with Seizure Disorder, HTN, HLD, Alcohol Abuse. BIBa found unresponsive. 

On arrival patient was hypothermic 94.0, patient  was found on the floor as per 

daughter Kristy  , patient lives with her she heard a  bang , he was found on 

the floor, confused staring , gradually improved BIB EMS to ED ,  CT head no 

acute changes labs are unremarkable, evaluated by Neurology recommended  

patient  hospitalization for further w/u 





Patient  wants to go home, hemodynamicaly stable, walking comfortably I 

discussed with the daughter about his decision, daughter aso communicated with 

him to stay but patient insisted to go home signing AMA , he understand risk  

of premature discharge, we educated to cont all his home meds, come to Ed if 

recurrence of symptoms and F/U with his PMD  


Condition: Fair





- Instructions


Diet, Activity, Other Instructions: 


Diabetic Diet


Disposition: AGAINST MEDICAL ADVICE





- Home Medications


Comprehensive Discharge Medication List: 


Ambulatory Orders





Nifedipine [Adalat cc] 60 mg PO DAILY 11/15/16 


Gabapentin [Neurontin -] 100 mg PO TID #90 capsule 01/27/17 


Insulin (Levemir) [Levemir Vial] 35 units SQ AM  ml 01/27/17 


Lisinopril [Prinivil] 5 mg PO DAILY #30 tablet 01/27/17 


Quetiapine Fumarate [Seroquel] 200 mg PO HS 02/22/18 


Insulin (Levemir) [Levemir Vial] 10 units SQ HS  ml 02/26/18 


Tamsulosin HCl [Flomax -] 0.4 mg PO DAILY@0830 #30 cap.er.24h 02/26/18 


Atorvastatin Ca [Lipitor] 10 mg PO HS 03/18/18 


Cholecalciferol (Vitamin D3) [Vitamin D -] 400 unit PO DAILY 03/18/18 


Citalopram Hydrobromide [Citalopram HBr] 10 mg PO DAILY 03/18/18 


Pantoprazole Sodium [Protonix -] 20 mg PO DAILY 03/18/18 


Thiamine HCl 50 mg PO DAILY 03/18/18

## 2018-04-02 NOTE — EKG
Test Reason : 

Blood Pressure : ***/*** mmHG

Vent. Rate : 097 BPM     Atrial Rate : 097 BPM

   P-R Int : 176 ms          QRS Dur : 094 ms

    QT Int : 376 ms       P-R-T Axes : 045 019 005 degrees

   QTc Int : 477 ms

 

NORMAL SINUS RHYTHM

NORMAL ECG

WHEN COMPARED WITH ECG OF 22-FEB-2018 14:27,

NO SIGNIFICANT CHANGE WAS FOUND

Confirmed by CAROLYNE MERCADO MD ([id number]) on 4/2/2018 11:45:08 AM

 

Referred By:             Confirmed By:CAROLYNE EMRCADO MD

## 2018-04-17 NOTE — PDOC
History of Present Illness





- General


Chief Complaint: Urinary Catheter Problem


Stated Complaint: PAIN


Time Seen by Provider: 04/17/18 15:52





- History of Present Illness


Initial Comments: 





04/17/18 16:44


"The patient is a 62 year old male with past medical history of DM, HTN, BPH s/

p velez catheter placement 2 weeks ago with complaints of pain in his penis. Pt 

states that the velez bag continuously slips down his leg, pulling on the 

catheter and causing significant pain. He states that when he pulls the bag up, 

the pain is relieved, but he is constantly pulling the bag up his leg. Upon 

arrival to ED, the nurse noted the patient did not have the straps placed 

properly around his leg. The bag was properly secured, and pt now reports 

complete resolusion of his pain. He denies any complaints at this time and 

reports he has an appointment with his urologist tomorrow. The patient denies 

any hematuria, urgency, or frequency. Denies any fevers or chills. Denies 

abdominal pain.





Urologist: Dr. Jose Manuel Tong 


"





Past History





- Past Medical History


Allergies/Adverse Reactions: 


 Allergies











Allergy/AdvReac Type Severity Reaction Status Date / Time


 


No Known Allergies Allergy   Verified 04/17/18 15:19











Home Medications: 


Ambulatory Orders





Nifedipine [Adalat cc] 60 mg PO DAILY 11/15/16 


Gabapentin [Neurontin -] 100 mg PO TID #90 capsule 01/27/17 


Insulin (Levemir) [Levemir Vial] 35 units SQ AM  ml 01/27/17 


Lisinopril [Prinivil] 5 mg PO DAILY #30 tablet 01/27/17 


Quetiapine Fumarate [Seroquel] 200 mg PO HS 02/22/18 


Insulin (Levemir) [Levemir Vial] 10 units SQ HS  ml 02/26/18 


Tamsulosin HCl [Flomax -] 0.4 mg PO DAILY@0830 #30 cap.er.24h 02/26/18 


Atorvastatin Ca [Lipitor] 10 mg PO HS 03/18/18 


Cholecalciferol (Vitamin D3) [Vitamin D -] 400 unit PO DAILY 03/18/18 


Citalopram Hydrobromide [Citalopram HBr] 10 mg PO DAILY 03/18/18 


Pantoprazole Sodium [Protonix -] 20 mg PO DAILY 03/18/18 


Thiamine HCl 50 mg PO DAILY 03/18/18 








CVA: Yes


COPD: No


Diabetes: Yes


Dialysis: Yes


HTN: Yes


Liver Disease: Yes


Psychiatric Problems: Yes


Seizures: Yes


Other medical history: velez catherer placed 2 weeks ago. 3/30/18





- Immunization History


Immunization Up to Date: Yes





- Suicide/Smoking/Psychosocial Hx


Smoking Status: No


Smoking History: Unknown if ever smoked


Have you smoked in the past 12 months: No


Number of Cigarettes Smoked Daily: 0


Cigars Per Day: 0


Information on smoking cessation initiated: No


Hx Alcohol Use: No


Drug/Substance Use Hx: No


Substance Use Type: None


Hx Substance Use Treatment: No





**Review of Systems





- Review of Systems


Comments:: 





04/17/18 16:47


"GENERAL/CONSTITUTIONAL: No fever or chills. No weakness.


HEAD, EYES, EARS, NOSE AND THROAT: No change in vision. No ear pain or 

discharge. No sore throat.


CARDIOVASCULAR: No chest pain or shortness of breath.


RESPIRATORY: No cough, wheezing, or hemoptysis.


GASTROINTESTINAL: No nausea, vomiting, diarrhea or constipation.


GENITOURINARY: + penis pain, No dysuria, frequency, or change in urination.


MUSCULOSKELETAL: No joint or muscle swelling or pain. No neck or back pain.


SKIN: No rash


NEUROLOGIC: No headache, vertigo, loss of consciousness, or change in strength/

sensation.


ENDOCRINE: No increased thirst. No abnormal weight change.


HEMATOLOGIC/LYMPHATIC: No anemia, easy bleeding, or history of blood clots.


ALLERGIC/IMMUNOLOGIC: No hives or skin allergy.


"





*Physical Exam





- Vital Signs


 Last Vital Signs











Temp Pulse Resp BP Pulse Ox


 


 97.6 F   104 H  18   151/95   96 


 


 04/17/18 15:11  04/17/18 15:11  04/17/18 15:11  04/17/18 15:11  04/17/18 15:11














- Physical Exam


Comments: 





04/17/18 16:47


"GENERAL: Awake, alert, and fully oriented, in no acute distress.


HEAD: No signs of trauma


EYES: PERRLA, EOMI, sclera anicteric, conjunctiva clear


ENT: Auricles normal inspection, hearing grossly normal, nares patent, 

oropharynx clear without exudates. Moist mucosa


NECK: Nontender, no stepoffs, Normal ROM, supple, no lymphadenopathy, JVD, or 

masses


LUNGS: Breath sounds equal, clear to auscultation bilaterally.  No wheezes, and 

no crackles


HEART: Regular rate and rhythm, normal S1 and S2, no murmurs, rubs or gallops


ABDOMEN: Soft, nontender, normoactive bowel sounds.  No guarding, no rebound.  

No masses


EXTREMITIES: Normal range of motion, no edema.  No clubbing or cyanosis. No 

cords, erythema, or tenderness


NEUROLOGICAL: Cranial nerves II through XII intact. 5/5 strength and sensation 

in all extremities, Normal speech, normal gait, normal cerebellar function


SKIN: Warm, Dry, normal turgor, no rashes or lesions noted.


: velez in place draining clear yellow urine, bag secured to R thigh, no 

lesions, erythema, bleeding, or purulence at urethral meatus





ED Treatment Course





- ADDITIONAL ORDERS


Additional order review: 


 Laboratory  Results











  04/17/18





  16:12


 


Urine Color  Straw


 


Urine Appearance  Clear


 


Urine pH  6.0


 


Ur Specific Gravity  1.009


 


Urine Protein  Negative


 


Urine Glucose (UA)  3+ H


 


Urine Ketones  Negative


 


Urine Blood  1+ H


 


Urine Nitrite  Negative


 


Urine Bilirubin  Negative


 


Urine Urobilinogen  Negative


 


Ur Leukocyte Esterase  Trace


 


Urine WBC (Auto)  3


 


Urine RBC (Auto)  5


 


Urine Mucus  Rare














Medical Decision Making





- Medical Decision Making





04/17/18 16:48


62 M with penile pain 2/2 velez catheter, now resolved after securing bag to 

leg.


- UA, UCx


- F/u urology tomorrow





Pt is well appearing, with normal vitals. Clinically stable for DC at this time.


I discussed the physical exam findings, ancillary test results and final 

diagnoses with the patient. I answered all of the patient's questions. The 

patient was satisfied with the care received and felt comfortable with the 

discharge plan and treatment plan.  The patient agrees to follow up with the 

primary care physician within 24-72 hours.








*DC/Admit/Observation/Transfer


Diagnosis at time of Disposition: 


 Velez catheter problem








- Discharge Dispostion


Disposition: HOME





- Referrals


Referrals: 


Milena Vang MD [Primary Care Provider] - 





- Patient Instructions


Printed Discharge Instructions:  How to Care for Your Velez Catheter -- Male


Additional Instructions: 


Follow up with Dr. Tong tomorrow as scheduled.


If you experience any worsening pain, bleeding, fevers, or any other concerning 

symptoms, return to the emergency department immediately.





- Post Discharge Activity





- Attestations


Physician Attestion: 





04/17/18 16:49








I, Dr. Yomi Li MD, attest that this document has been prepared under my 

direction and personally reviewed by me in its entirety.   I further attest, 

that it accurately reflects all work, treatment, procedures and medical decision

-making performed by me.

## 2018-04-20 NOTE — PDOC
Patient Follow-up (Call Back)





- Post ED Follow - Up


Condition at time of discharge: Good


Disposition at time of original discharge: HOME


Reason for Call Back: Abnwl. Microbiology (Spoke with family member who states 

patient returned to the hospital today for admission)

## 2018-04-20 NOTE — PDOC
Patient Follow-up (Call Back)





- Post ED Follow - Up


Condition at time of discharge: Good


Disposition at time of original discharge: HOME


Reason for Call Back: Abnwl. Microbiology (urine cx preliminary shows group d 

or entero coccus 80-90,000 cfu/ml. Has velez catheter. Seem by urology on 4/18 (

dr. Tong). Will await final report.)

## 2018-08-07 NOTE — PDOC
History of Present Illness





- General


Chief Complaint: Altered Mental Status


Stated Complaint: AMS


Time Seen by Provider: 08/07/18 19:17


History Source: Patient





- History of Present Illness


Initial Comments: 





08/07/18 19:32


Patient is a 62 male with a PMH of NIDDM, HTN, GERD, MDD and alcohol abuse 

presents to the ED for AMS.  Patient is a limited historian 2/2 to his AMS but 

notes he got in an argument with his daughter this morning and that is why he 

believes he is in the hospital.  





Allergies: as per EMR, none


Surgical: unable to provide


Social endorses alcohol, denies recreational drgus.





Medications:  Gabapentin, Lisinopril, Mirtazapine, ASA, Tamsulosin, Biscadoyl, 

Folic Acid, Pantaprolazole, Quietiapine





As per EMR, patient was evaluated in our ED 06/29 for a complaint of AMS at 

which time he had an elevated Cr and was admitted for EMILIE. Head and abdominal 

CT were negative. 





08/07/18 20:38


Patient's daughter provides additional history that patient was agitated today 

and had episodes of tangential speech.  Was taken to [facility] where he was 

evaluated and medically cleared for discharge.  Patient has h/o multiple 

episode of AMS and has been evaluated by a neurologist on prior occasion 

however daughter is unaware of any formal diagnoses.  











Past History





- Past Medical History


Allergies/Adverse Reactions: 


 Allergies











Allergy/AdvReac Type Severity Reaction Status Date / Time


 


No Known Allergies Allergy   Verified 08/07/18 18:28











Home Medications: 


Ambulatory Orders





Aspirin [Aspirin EC] 81 mg PO DAILY 08/07/18 


Bisacodyl [Dulcolax] 5 mg PO BID PRN 08/07/18 


Folic Acid 1 mg PO DAILY 08/07/18 


Gabapentin [Neurontin] 400 mg PO BID 08/07/18 


Lisinopril 30 mg PO DAILY 08/07/18 


Mirtazapine [Remeron -] 30 mg PO DAILY 08/07/18 


Pantoprazole Sodium [Protonix] 40 mg PO DAILY 08/07/18 


Quetiapine Fumarate [Seroquel -] 200 mg PO HS 08/07/18 


Tamsulosin HCl [Flomax] 0.4 mg PO HS 08/07/18 








CVA: Yes


COPD: No


DVT: No


Diabetes: Yes


Dialysis: Yes


HTN: Yes


Liver Disease: Yes


Psychiatric Problems: Yes


Seizures: Yes





- Immunization History


Immunization Up to Date: Yes





- Suicide/Smoking/Psychosocial Hx


Smoking Status: No


Smoking History: Unknown if ever smoked


Have you smoked in the past 12 months: No


Number of Cigarettes Smoked Daily: 0


Cigars Per Day: 0


Information on smoking cessation initiated: No


Hx Alcohol Use: No


Drug/Substance Use Hx: No


Substance Use Type: None


Hx Substance Use Treatment: No





**Review of Systems





- Review of Systems


Able to Perform ROS?: No





*Physical Exam





- Vital Signs


 Last Vital Signs











Temp Pulse Resp BP Pulse Ox


 


 99.4 F   106 H  20   110/41   97 


 


 08/07/18 18:29  08/07/18 18:29  08/07/18 18:29  08/07/18 18:29  08/07/18 18:29














- Physical Exam


General Appearance: Yes: Disheveled


Neck: positive: Trachea midline, Supple


Respiratory/Chest: positive: Lungs Clear, Normal Breath Sounds


Cardiovascular: positive: Regular Rate, S1, S2


Vascular Pulses: Dorsalis-Pedis (R): 2+, Doralis-Pedis (L): 2+


Gastrointestinal/Abdominal: positive: Normal Bowel Sounds, Soft.  negative: 

Distended, Guarding, Rebound, Tenderness


Extremity: positive: Normal Capillary Refill, Normal Inspection


Integumentary: positive: Normal Color, Dry, Warm


Neurologic: positive: Alert, Other (slurred speech, A&O x2 - able to self 

identify and identify that it is the month of August, unable to identify 

location)


Deep Tendon Reflexes: Knee (L): 1+, Knee (R): 2+





ED Treatment Course





- LABORATORY


CBC & Chemistry Diagram: 


 08/07/18 20:15





 08/07/18 20:15





Medical Decision Making





- Medical Decision Making





08/07/18 19:38


62 year old male presents with AMS.  Disheveled, A&O x2 @ presentation.  

Documented h/o ETOH abuse.  PE significant for disheveled appearance, slurred 

speech.  Will obtain full w/u including infectious/metabolic for AMS.  Patient'

s daughter @ bedside.





08/07/18 20:44


Case d/w Dr. Resendiz, Psychiatry @ [facility] ED- patient was BIBEMS earlier today 

following a verbal argument with his daughter.  During course of evaluation, 

patient was calm, and noted to be non-psychotic/non-homicidal/non-suicidal.  No 

imaging or labs performed.  Patient was discharged home w/family @ 1730. 


Patient resting comfortably.  Labs pending. 





08/07/18 21:23


Head CT negative for acute intracranial pathology, however imaging notable for B

/L enlarged ventricles - possible NPH? 





08/07/18 21:44


Cr 2.4, will give additional 1 L IV NS.  Admit for EMILIE and further neurological 

evaluation.


Case discussed with inpatient hospitalist team.  Patient admitted to 

observation.














*DC/Admit/Observation/Transfer


Diagnosis at time of Disposition: 


 EMILIE (acute kidney injury)








- Discharge Dispostion


Condition at time of disposition: Fair


Decision to Admit order: Yes





- Referrals


Referrals: 


Raza Abbott [Primary Care Provider] - 





- Patient Instructions





- Post Discharge Activity

## 2018-08-07 NOTE — HP
CHIEF COMPLAINT: altered mental status





PCP: Dr. Vang





HISTORY OF PRESENT ILLNESS:





62 year old male with a hx of NIDDM, HTN, GERD, MDD, alcohol abuse, cirrhosis 

presents to the hospital for altered mental status. He was brought by his 

daughter, who is not present at bedside during the examination. Per record, 

patient had an argument with his daughter, who reported that he was not acting 

like himself and brought him to the ED. Patient reports that he fell earlier in 

the day, noting that he feels uneasy on his feet and felt dizzy. He 

additionally reports urinary incontinence. Denies chest pain, SOB, nausea, 

vomiting, diarrhea, fevers, chills, headache, blurry vision, changes to his 

vision. States that he had a similar presentation last year.





ER course was notable for:


(1) BUN/Cre (53/2.4)


(2) CT head: mild atrophy, ventricular dilation


(3) ammonia/B12 levels normal





Recent Travel: denies





PAST MEDICAL HISTORY: NIDDM, HTN, GERD, MDD, alcohol abuse, cirrhosis





PAST SURGICAL HISTORY: R carotid artery surgery?





Social History:


Smoking: denies


Alcohol: quit 5 years ago according to the patient


Drugs: denies





Family History: cancer in both parents


Allergies





No Known Allergies Allergy (Verified 08/07/18 18:28)


 








HOME MEDICATIONS:


 Home Medications











 Medication  Instructions  Recorded


 


Aspirin [Aspirin EC] 81 mg PO DAILY 08/07/18


 


Bisacodyl [Dulcolax] 5 mg PO BID PRN 08/07/18


 


Folic Acid 1 mg PO DAILY 08/07/18


 


Gabapentin [Neurontin] 400 mg PO BID 08/07/18


 


Lisinopril 30 mg PO DAILY 08/07/18


 


Mirtazapine [Remeron -] 30 mg PO DAILY 08/07/18


 


Pantoprazole Sodium [Protonix] 40 mg PO DAILY 08/07/18


 


Quetiapine Fumarate [Seroquel -] 200 mg PO HS 08/07/18


 


Tamsulosin HCl [Flomax] 0.4 mg PO HS 08/07/18








REVIEW OF SYSTEMS


CONSTITUTIONAL: 


Absent:  fever, chills, diaphoresis, generalized weakness, malaise, loss of 

appetite, weight change


HEENT: 


Absent:  rhinorrhea, nasal congestion, throat pain, throat swelling, difficulty 

swallowing, mouth swelling, ear pain, eye pain, visual changes


CARDIOVASCULAR: 


Absent: chest pain, syncope, palpitations, irregular heart rate, lightheadedness

, peripheral edema


RESPIRATORY: 


Absent: cough, shortness of breath, dyspnea with exertion, orthopnea, wheezing, 

stridor, hemoptysis


GASTROINTESTINAL:


Absent: abdominal pain, abdominal distension, nausea, vomiting, diarrhea, 

constipation, melena, hematochezia


GENITOURINARY: incontinence


Absent: dysuria, frequency, urgency, hesitancy, hematuria, flank pain, genital 

pain


MUSCULOSKELETAL: 


Absent: myalgia, arthralgia, joint swelling, back pain, neck pain


SKIN: 


Absent: rash, itching, pallor


HEMATOLOGIC/IMMUNOLOGIC: 


Absent: easy bleeding, easy bruising, lymphadenopathy, frequent infections


ENDOCRINE:


Absent: unexplained weight gain, unexplained weight loss, heat intolerance, 

cold intolerance


NEUROLOGIC: unsteady gait, dizziness


Absent: headache, focal weakness or paresthesias,  seizure, mental status 

changes, bladder or bowel incontinence


PSYCHIATRIC: 


Absent: anxiety, depression, suicidal or homicidal ideation, hallucinations.








PHYSICAL EXAMINATION


 Vital Signs - 24 hr











  08/07/18





  18:29


 


Temperature 99.4 F


 


Pulse Rate 106 H


 


Respiratory 20





Rate 


 


Blood Pressure 110/41


 


O2 Sat by Pulse 97





Oximetry (%) 











GENERAL: A&Ox4, no acute distress


NECK: R neck surgical scar noted on exam


EYES: PERRLA, EOMI


LUNGS: CTA, no wheezes


HEART: RRR, no murmurs


ABDOMEN: soft, nontender, bs present


NEUROLOGICAL:  Cranial nerves II-XII intact. Normal speech. Normal gait. No 

dysmetria, normal sensation


SKIN: Warm, dry, normal turgor, no rashes or lesions noted, normal capillary 

refill. 








 Laboratory Results - last 24 hr











  08/07/18 08/07/18 08/07/18





  20:15 20:15 20:15


 


WBC  6.6  


 


RBC  4.23  


 


Hgb  13.0  


 


Hct  37.9  D  


 


MCV  89.7  


 


MCH  30.8  


 


MCHC  34.3  


 


RDW  13.0  


 


Plt Count  207  D  


 


MPV  8.4  


 


Absolute Neuts (auto)  3.8  


 


Neutrophils %  57.5  


 


Lymphocytes %  29.1  


 


Monocytes %  10.7 H  


 


Eosinophils %  2.1  


 


Basophils %  0.6  


 


Nucleated RBC %  0  


 


Sodium   137 


 


Potassium   4.7 


 


Chloride   103 


 


Carbon Dioxide   23 


 


Anion Gap   11 


 


BUN   53 H 


 


Creatinine   2.4 H 


 


Creat Clearance w eGFR   27.57 


 


Random Glucose   104  D 


 


Lactic Acid   


 


Calcium   9.5 


 


Total Bilirubin   0.2 


 


AST   22 


 


ALT   32  D 


 


Alkaline Phosphatase   134 H 


 


Ammonia    19.15


 


Total Protein   8.1 


 


Albumin   4.2 


 


Vitamin B12   510  D 


 


TSH   1.46  D 


 


Urine Color   


 


Urine Appearance   


 


Urine pH   


 


Ur Specific Gravity   


 


Urine Protein   


 


Urine Glucose (UA)   


 


Urine Ketones   


 


Urine Blood   


 


Urine Nitrite   


 


Urine Bilirubin   


 


Urine Urobilinogen   


 


Ur Leukocyte Esterase   


 


Urine WBC (Auto)   


 


Urine RBC (Auto)   


 


Ur Epithelial Cells   


 


Urine Bacteria   


 


Hyaline Casts   


 


Urine Mucus   


 


Opiates Screen   


 


Methadone Screen   


 


Barbiturate Screen   


 


Phencyclidine Screen   


 


Ur Amphetamines Screen   


 


MDMA (Ecstasy) Screen   


 


Benzodiazepines Screen   


 


Cocaine Screen   


 


U Marijuana (THC) Screen   


 


Alcohol, Quantitative   














  08/07/18 08/07/18 08/07/18





  20:15 20:15 20:50


 


WBC   


 


RBC   


 


Hgb   


 


Hct   


 


MCV   


 


MCH   


 


MCHC   


 


RDW   


 


Plt Count   


 


MPV   


 


Absolute Neuts (auto)   


 


Neutrophils %   


 


Lymphocytes %   


 


Monocytes %   


 


Eosinophils %   


 


Basophils %   


 


Nucleated RBC %   


 


Sodium   


 


Potassium   


 


Chloride   


 


Carbon Dioxide   


 


Anion Gap   


 


BUN   


 


Creatinine   


 


Creat Clearance w eGFR   


 


Random Glucose   


 


Lactic Acid  0.9  


 


Calcium   


 


Total Bilirubin   


 


AST   


 


ALT   


 


Alkaline Phosphatase   


 


Ammonia   


 


Total Protein   


 


Albumin   


 


Vitamin B12   


 


TSH   


 


Urine Color   


 


Urine Appearance   


 


Urine pH   


 


Ur Specific Gravity   


 


Urine Protein   


 


Urine Glucose (UA)   


 


Urine Ketones   


 


Urine Blood   


 


Urine Nitrite   


 


Urine Bilirubin   


 


Urine Urobilinogen   


 


Ur Leukocyte Esterase   


 


Urine WBC (Auto)   


 


Urine RBC (Auto)   


 


Ur Epithelial Cells   


 


Urine Bacteria   


 


Hyaline Casts   


 


Urine Mucus   


 


Opiates Screen    Negative


 


Methadone Screen    Negative


 


Barbiturate Screen    Negative


 


Phencyclidine Screen    Negative


 


Ur Amphetamines Screen    Negative


 


MDMA (Ecstasy) Screen    Negative


 


Benzodiazepines Screen    Negative


 


Cocaine Screen    Negative


 


U Marijuana (THC) Screen    Negative


 


Alcohol, Quantitative   < 5.0 














  08/07/18





  20:50


 


WBC 


 


RBC 


 


Hgb 


 


Hct 


 


MCV 


 


MCH 


 


MCHC 


 


RDW 


 


Plt Count 


 


MPV 


 


Absolute Neuts (auto) 


 


Neutrophils % 


 


Lymphocytes % 


 


Monocytes % 


 


Eosinophils % 


 


Basophils % 


 


Nucleated RBC % 


 


Sodium 


 


Potassium 


 


Chloride 


 


Carbon Dioxide 


 


Anion Gap 


 


BUN 


 


Creatinine 


 


Creat Clearance w eGFR 


 


Random Glucose 


 


Lactic Acid 


 


Calcium 


 


Total Bilirubin 


 


AST 


 


ALT 


 


Alkaline Phosphatase 


 


Ammonia 


 


Total Protein 


 


Albumin 


 


Vitamin B12 


 


TSH 


 


Urine Color  Ltyellow


 


Urine Appearance  Clear


 


Urine pH  5.0


 


Ur Specific Gravity  1.010


 


Urine Protein  Negative


 


Urine Glucose (UA)  Negative


 


Urine Ketones  Negative


 


Urine Blood  2+ H


 


Urine Nitrite  Negative


 


Urine Bilirubin  Negative


 


Urine Urobilinogen  Negative


 


Ur Leukocyte Esterase  Trace


 


Urine WBC (Auto)  7


 


Urine RBC (Auto)  4


 


Ur Epithelial Cells  Rare


 


Urine Bacteria  Rare


 


Hyaline Casts  9


 


Urine Mucus  Rare


 


Opiates Screen 


 


Methadone Screen 


 


Barbiturate Screen 


 


Phencyclidine Screen 


 


Ur Amphetamines Screen 


 


MDMA (Ecstasy) Screen 


 


Benzodiazepines Screen 


 


Cocaine Screen 


 


U Marijuana (THC) Screen 


 


Alcohol, Quantitative 





 


ASSESSMENT/PLAN:





62 year old male with a hx of NIDDM, HTN, GERD, MDD, alcohol abuse, cirrhosis 

presents to the hospital for altered mental status and kidney injury





#Altered Mental Status: could be related to normal pressure hydrocephalus (

given incontinence, unsteady gait, dementia/delirium enlarged ventricles on CT) 

vs less likely uremic/metabolic encephalopathy due to hx of cirrhosis; did not 

appear altered to me, but baseline to me is unknown; patient is also on many 

neurotropic medications, will need evaluation for these contributing to his 

current clinical presentation


-neurology consulted


-UA not significant for urinary tract infection


-orthostatic vital signs


-ammonia normal


-B12 normal


-TSH normal


-alcohol level normal





#Elevated BUN/Creatinine: EMILIE vs CKD


-Dr. Reid saw patient on previous admission


-hold nephrotoxic medications


-urine lytes


-kidney ultrasound


-bladder scan


-i's/o's


-continue flomax


-NS @ 83cc/hr 1 bag





#Hypertension: normotensive


-lisinopril 30mg





#GERD


-continue protonix





#Diabetes Mellitus


-BGMs


-ISS





#FEN


-NS @ 83cc/hr


-lytes wnl


-diabetic diet





#Prophylaxis


-heparin subQ TID





#Disposition


-admit obs





Visit type





- Emergency Visit


Emergency Visit: Yes


Care time: The patient presented to the Emergency Department on the above date 

and was hospitalized for further evaluation of their emergent condition.





- New Patient


This patient is new to me today: Yes


Date on this admission: 08/08/18





- Critical Care


Critical Care patient: No





Hospitalist Screening





- Colonoscopy Questionnaire


Colonoscopy Questionnaire: 





Colonoscopy Questionnaire








-   Patient:


50 - 75 years old and never had a screening colonoscopy: Unknown


History of colon or rectal polyps, or CA: Unknown


History of IBD, Crohn's disease or UC: Unknown


History of abdominal radiation therapy as a child: Unknown





-   Relative:


1 with colon or rectal CA, or polyps at age 60 or younger: Unknown


Colon or rectal CA diagnosed at age 45 or younger: Unknown


Multiple relatives with colon or rectal CA: Unknown





-   Outcome:


Screening Result: Negative Screen

## 2018-08-08 NOTE — CON.CARD
Consult


Consult Specialty:: Cardiology


Referred by:: Dr. Hernandez


Reason for Consultation:: possible syncope





- History of Present Illness


Chief Complaint: AMS


History of Present Illness: 


62 year old man pmh HTN, DMII, etoh abuse, cirrhosis, CKD prior admissions for 

AMS, admitted again for AMS witnessed by his family.


pt seen and examined today in [facility] in er, pt awake alert appears oriented. 

denies any current complaints. states that he believes he had an episode of 

passing out prior to coming in, states he took a shower, then went to his room 

and briefly lost consciousness but was able to sit in a chair and did not fall 

to the ground. No report of loss of consciousness from family on review of 

chart.


pt denies having any chest pan, sob, palpitations. no pnd, orthopnea, or LE 

edema.


Noted to have MORAIMA on CKD with elevated bun/creat





- History Source


History Provided By: Patient, Medical Record


Limitations to Obtaining History: Poor Historian





- Past Medical History


CNS: Yes: Seizure


Cardio/Vascular: Yes: HTN, Hyperlipdemia


Renal/: Yes: BPH


Psych: Yes: Addictions


Endocrine: Yes: Diabetes Mellitus


Additional Medical History: falls -> neck pain





- Alcohol/Substance Use


Hx Alcohol Use: No


History of Substance Use: reports: None





- Smoking History


Smoking history: Unknown if ever smoked


Have you smoked in the past 12 months: No


Aproximately how many cigarettes per day: 0





- Social History


Usual Living Arrangement: With Spouse


History of Recent Travel: No





Home Medications





- Allergies


Allergies/Adverse Reactions: 


 Allergies











Allergy/AdvReac Type Severity Reaction Status Date / Time


 


No Known Allergies Allergy   Verified 08/07/18 18:28














- Home Medications


Home Medications: 


Ambulatory Orders





Aspirin [Aspirin EC] 81 mg PO DAILY 08/07/18 


Bisacodyl [Dulcolax] 5 mg PO BID PRN 08/07/18 


Folic Acid 1 mg PO DAILY 08/07/18 


Gabapentin [Neurontin] 400 mg PO BID 08/07/18 


Lisinopril 30 mg PO DAILY 08/07/18 


Mirtazapine [Remeron -] 30 mg PO DAILY 08/07/18 


Pantoprazole Sodium [Protonix] 40 mg PO DAILY 08/07/18 


Quetiapine Fumarate [Seroquel -] 200 mg PO HS 08/07/18 


Tamsulosin HCl [Flomax] 0.4 mg PO HS 08/07/18 











Family Disease History





- Family Disease History


Family History: Denies





Review of Systems





- Review of Systems


Constitutional: reports: Weakness.  denies: No Symptoms, Chills, Diaphoresis, 

Fever, Lethargy, Loss of Appetite, Malaise, Night Sweats, Unintentional Wgt. 

Loss, Other


Eyes: denies: No Symptoms, Blind Spots, Blurred Vision, Double Vision, Eye Pain

, Floaters, Photophobia, Recent Change in Vision, Other


HENT: denies: No Symptoms, Difficult Swallowing, Ear Discharge, Ear Pain, 

Epistaxis, Gingival Bleeding, Hearing Loss, Mouth Swelling, Nasal Congestion, 

Ocular Prosthesis, Throat Pain, Toothache, Ringing in Ears, Other


Neck: denies: No Symptoms, Decreased ROM, Lumps, Pain on Movement, Stiffness, 

Swollen Glands, Tenderness, Other


Cardiovascular: denies: No Symptoms, Chest Pain, Edema, Palpitations, Shortness 

of Breath, Other


Respiratory: denies: No Symptoms, Cough, Exercise Intolerance, Hemoptysis, 

Orthopnea, PND, Snoring, SOB, SOB on Exertion, Wheezing, Other


Gastrointestinal: denies: No Symptoms, Abdominal Pain, Bloating, Constipation, 

Diarrhea, Dysphagia, Indigestion, Melena, Nausea, Rectal Bleeding, Vomiting, 

Vomiting Blood, Other


Genitourinary: denies: No Symptoms, Burning, Discharge, Dysuria, Flank Pain, 

Frequency, Hematuria, Incontinence, Lesions, Menses, Pain, Testicular Mass, 

Testicular Pain, Testicular Swelling, Urgency, Vaginal Bleeding, Other


Breasts: denies: No Symptoms Reported, See HPI, Breast Implants, Discharge from 

Nipple, Lumps, Pain, Skin Changes, Other


Musculoskeletal: denies: No Symptoms, Back Pain, Crepitus, Decreased ROM, 

Extremity Pain, Joint Pain, Joint Swelling, Muscle Pain, Muscle Cramps, Muscle 

Weakness, Other


Integumentary: denies: No Symptoms, Blister, Bruising, Change in Color, Eczema, 

Erythema, Incision, Lesions, Lump, Pallor, Pruritis, Rash, Wound, Other


Neurological: reports: Change in LOC, Change in Speech, Confusion, Dizziness, 

Incoordination, Syncope, Unsteady Gait, Weakness.  denies: No Symptoms, Headache

, Numbness, Parasthesia, Pre-Existing Deficit, Seizure, Tremors, Other


Endocrine: denies: No Symptoms, Excessive Sweating, Flushing, Increased Hunger, 

Increased Thirst, Intolerance to Cold, Intolerance to Heat, Unexplained Weight 

Gain, Unexplained Weight Loss, Other


Hematology/Lymphatic: denies: No Symptoms, Easily Bruised, Excessive Bleeding, 

Swollen Glands, Other


Psychiatric: denies: No Symptoms, Altered Sleep Pattern, Anxiety, Depression, 

Hallucinations, Panic, Paranoia, Suicidal, Other


Vital Signs: 


 Vital Signs











Temperature  98.1 F   08/08/18 09:47


 


Pulse Rate  92 H  08/08/18 09:47


 


Respiratory Rate  18   08/08/18 09:47


 


Blood Pressure  135/84   08/08/18 09:47


 


O2 Sat by Pulse Oximetry (%)  100   08/08/18 09:47











Constitutional: Yes: No Distress, Calm


Eyes: Yes: Conjunctiva Clear, EOM Intact, PERRL


HENT: Yes: Atraumatic, Normocephalic


Neck: Yes: Supple, Trachea Midline


Respiratory: Yes: Regular, CTA Bilaterally.  No: Rales, Rhonchi, Wheezes


Gastrointestinal: Yes: Normal Bowel Sounds, Soft.  No: Distention, Tenderness


Cardiovascular: Yes: Regular Rate and Rhythm.  No: Bradycardia, Tachycardia, 

Pulse Irregular, Gallop, Rub, Varicosities


JVD: No


Carotid Bruit: No


PMI: Non-Displaced


Heart Sounds: Yes: S1, S2.  No: Split S2, S3, S4, Clicks, Gallop, Rub, Bruit


Murmur: No: Systolic Murmur, Diastolic Murmur


Edema: No


Peripheral Pulses WNL: Yes


Neurological: Yes: Alert, Oriented


Psychiatric: Yes: Alert, Oriented





- Other Data


Labs, Other Data: 


 CBC, BMP





 08/08/18 08:30 





 08/08/18 08:30 








nsr 100bpm, normal ecg





Echo: Report Reviewed





Imaging





- Results


Chest X-ray: Report Reviewed, Image Reviewed


EKG: Report Reviewed, Image Reviewed


Other: Report Reviewed, Image Reviewed





Assessment/Plan


62 year old man pmh HTN, DMII, etoh abuse, cirrhosis, CKD prior admissions for 

AMS, admitted again for AMS witnessed by his family.


pt seen and examined today in [facility] in er, pt awake alert appears oriented. 

denies any current complaints. states that he believes he had an episode of 

passing out prior to coming in, states he took a shower, then went to his room 

and briefly lost consciousness but was able to sit in a chair and did not fall 

to the ground. No report of loss of consciousness from family on review of 

chart.


pt denies having any chest pan, sob, palpitations. no pnd, orthopnea, or LE 

edema.


Noted to have MORAIMA on CKD with elevated bun/creat





AMS possible syncope although not reported by family


EKG-nsr 100bpm, otherwise normal ecg


ECHO 7/28/17-mod LVH, normal LVEF, mod mac, no sig valvular abnl


-MORAIMA with elevated bun/creat, intravascular depletion and use of ACE-I, 

improving with hydration


-can check orthostatic bp


-improving with IVF hydration


-does not require additional inpatient cardiac work up at this time


-[facility] close outpatient fup





Please call with any additional questions.

## 2018-08-08 NOTE — PDOC
Attending Attestation





- Resident


Resident Name: Sara Lomas





- ED Attending Attestation


I have performed the following: I have examined & evaluated the patient, The 

case was reviewed & discussed with the resident, I agree w/resident's findings 

& plan, Exceptions are as noted





- HPI


HPI: 





08/08/18 02:04


Family brought in 62-year-old male because of confusion.  Patient has a long-

standing history of alcoholism.





- Physicial Exam


PE: 





08/08/18 02:04


wnwd  dishevel 62-year-old male who is aware of his name and the month  but he 

does not know his location


Head is normocephalic, atraumatic. no scalp lacerations.


Eyes pupils are equal and reactive to light and accommodation.


Neck supple, no JVD.


Lungs are clear to auscultation.


CVS regular rate and rhythm S1, S2


Abdomen is soft, no rebound, no tenderness.


Extremities no signs of acute cellulitis. 


skin warm,dry


 Neuro poor  historian, patient is able to move all of his extremities with 5 

out of 5 motor strength bilaterally, ambulatory, poor historian





- Medical Decision Making





08/08/18 02:08


CAT scan of the head is negative for any acute intracranial pathology


Labs reviewed and showed chronic renal insufficiency.  Otherwise, they are 

unremarkable


Impression confusion, altered mental status for admission for further evaluation

## 2018-08-08 NOTE — CONSULT
Consult - text type





- Consultation


Consultation Note: 





Renal Consult for EMILIE on CKD








This is a 62 year old gentleman with PMhx of Hypertension, DM Type 2, GERD, MDD

, ETOH abuse, CKD (recent admission with EMILIE) presents with confusion and 

unsteady gain with EMILIE. Pt reports walking yesterday and then blacking out and 

falling. He did not recall any dizziness, palpitations prior to fall. Pt was 

restarted on ACEi which was held last admission. Reports a good oral intake. 

Denies any diarrhea, N/V. No NSAID use. Denies any flank pain, dysuria. Has 

urinary incontiance related to enlarged prostate. No fever, chills. 





PMHx: as above


Allergies: NKDA


Family Hx: NC


Social Hx: Former ETOH abuse, no T


ROS: as per HPI, all other pertinent ros negative 


 Home Medications











 Medication  Instructions  Recorded


 


Aspirin [Aspirin EC] 81 mg PO DAILY 08/07/18


 


Bisacodyl [Dulcolax] 5 mg PO BID PRN 08/07/18


 


Folic Acid 1 mg PO DAILY 08/07/18


 


Gabapentin [Neurontin] 400 mg PO BID 08/07/18


 


Lisinopril 30 mg PO DAILY 08/07/18


 


Mirtazapine [Remeron -] 30 mg PO DAILY 08/07/18


 


Pantoprazole Sodium [Protonix] 40 mg PO DAILY 08/07/18


 


Quetiapine Fumarate [Seroquel -] 200 mg PO HS 08/07/18


 


Tamsulosin HCl [Flomax] 0.4 mg PO HS 08/07/18








 Vital Signs











Temperature  98.1 F   08/08/18 09:47


 


Pulse Rate  92 H  08/08/18 09:47


 


Respiratory Rate  18   08/08/18 09:47


 


Blood Pressure  135/84   08/08/18 09:47


 


O2 Sat by Pulse Oximetry (%)  100   08/08/18 09:47








 Intake & Output











 08/05/18 08/06/18 08/07/18 08/08/18





 23:59 23:59 23:59 23:59


 


Weight   70.307 kg 





NAD, awake and alert


Neck supple, no JVD


RRR, no M/R


CTA, no rales


soft NT/ND, no organomegaly


no bladder distension


No LE edema, clubbing or cyanosis 


 CBC, BMP





 08/08/18 08:30 





 08/08/18 08:30 





 Current Medications





Aspirin (Ecotrin -)  81 mg PO DAILY [facility]


   Last Admin: 08/08/18 10:11 Dose:  81 mg


Bisacodyl (Dulcolax -)  5 mg PO BID PRN


   PRN Reason: CONSTIPATION


Folic Acid (Folic Acid -)  1 mg PO DAILY [facility]


   Last Admin: 08/08/18 10:11 Dose:  1 mg


Gabapentin (Neurontin -)  400 mg PO BID [facility]


   Last Admin: 08/08/18 10:11 Dose:  400 mg


Heparin Sodium (Porcine) (Heparin -)  5,000 unit SQ TID [facility]


   Last Admin: 08/08/18 06:30 Dose:  5,000 unit


Insulin Aspart (Novolog Vial Sliding Scale -)  1 vial SQ ACHS [facility]; Protocol


   Last Admin: 08/08/18 12:34 Dose:  3 unit


Mirtazapine (Remeron -)  30 mg PO HS [facility]


Pantoprazole Sodium (Protonix -)  40 mg PO DAILY [facility]


   Last Admin: 08/08/18 10:11 Dose:  40 mg


Quetiapine Fumarate (Seroquel -)  200 mg PO HS [facility]


Tamsulosin HCl (Flomax -)  0.4 mg PO HS [facility]





 62 year old gentleman with PMhx of Hypertension, DM Type 2, GERD, MDD, ETOH 

abuse, CKD (recent admission with EMILIE) presents with confusion and unsteady 

gain with EMILIE.





#EMILIE (likely due to volume depletion +/- renal hypoprofusion with ACEi vs. BELKIS+

ACEi induced renal injury)


#Confusion/Unsteady gait 


#Hypertension 


#BPH/Urinary Incontinence 


#Hemeturia 





Renal function improving


will start gentle IVF hydration 


hold ACEi for now


FeNa was 6% indicating tubular damage 


Check bladder US r/o bladder pathology leading to hematuria


US of kidneys were w/o pathology 


F/u urine cultures 


repeat UA in 24 hours 


Continue flomax





Thank you 


Will follow





Michael Reid DO

## 2018-08-08 NOTE — EKG
Test Reason : 

Blood Pressure : ***/*** mmHG

Vent. Rate : 100 BPM     Atrial Rate : 100 BPM

   P-R Int : 188 ms          QRS Dur : 106 ms

    QT Int : 358 ms       P-R-T Axes : 049 017 029 degrees

   QTc Int : 461 ms

 

NORMAL SINUS RHYTHM

NORMAL ECG

WHEN COMPARED WITH ECG OF 29-JUN-2018 09:35,

NO SIGNIFICANT CHANGE WAS FOUND

Confirmed by HIREN MARY MD ([id number]) on 8/8/2018 10:37:28 AM

 

Referred By:             Confirmed By:HIREN MARY MD

## 2018-08-08 NOTE — CONSULT
Consult Detox [facility]


Reason for Current Admission/Consult: consult for questionable alcohol use





- History


History of Present Illness: 





pt known to me from prior admission. Pt had denied alcohol use at that time. Pt 

continues to state that his last alcohol drink was 5 years ago when his 

grandson was born. Admanatly denies current alcohol use





- History Source


History Provided By: Patient


Limitations to Obtaining History: No Limitations





- Alcohol/Substance Use


Hx Alcohol Use: Yes (pt states in distant past, denies other illicit drug use, 

denies cig use)


Hx Substance Use: No


Hx Substance Use Treatment: No





- Past Medical History


CNS: Yes: Seizure


Cardio/Vascular: Yes: HTN, Hyperlipdemia


Renal/: Yes: BPH


Psych: Yes: Addictions


Endocrine: Yes: Diabetes Mellitus


Additional Medical History: falls -> neck pain





CIWA Score





- CIWA Score


Nausea/Vomitin-No Nausea/No Vomiting


Muscle Tremors: 2


Anxiety: 0-No Anxiety, at Ease


Agitation: 0-Normal Activity


Paroxysmal Sweats: No Perspiration


Orientation: 0-Oriented


Tacttile Disturbances: 0-None


Auditory Disturbances: 0-None


Visual Disturbances: 0-None


Headache: 0-None Present


CIWA-Ar Total Score: 2





Assessment Plan





- Diagnosis


(1) Withdrawal symptoms, alcohol


Status: Acute   


Qualifiers: 


   Complication of substance-induced condition: uncomplicated   Qualified Code(s

): F10.230 - Alcohol dependence with withdrawal, uncomplicated   





- Plan


Plan: 





Pt denies alcohol use. Tox screen at admission neg for alcohol. Prior admission 

pt denied alcohol use.


Will give librium prn for withdrawal Sx

## 2018-08-08 NOTE — PN
Progress Note, Physician


Chief Complaint: 





EVENTS NOTED AND REVIEWED


AWAKE ALERT


+PRE-SYNCOPAL EPISODE





- Current Medication List


Current Medications: 


Active Medications





Aspirin (Ecotrin -)  81 mg PO DAILY [facility]


   Last Admin: 08/08/18 10:11 Dose:  81 mg


Bisacodyl (Dulcolax -)  5 mg PO BID PRN


   PRN Reason: CONSTIPATION


Folic Acid (Folic Acid -)  1 mg PO DAILY [facility]


   Last Admin: 08/08/18 10:11 Dose:  1 mg


Gabapentin (Neurontin -)  400 mg PO BID [facility]


   Last Admin: 08/08/18 10:11 Dose:  400 mg


Heparin Sodium (Porcine) (Heparin -)  5,000 unit SQ TID [facility]


   Last Admin: 08/08/18 15:22 Dose:  5,000 unit


Sodium Chloride (Normal Saline -)  1,000 mls @ 83 mls/hr IV ASDIR [facility]


   Last Admin: 08/08/18 15:43 Dose:  83 mls/hr


Insulin Aspart (Novolog Vial Sliding Scale -)  1 vial SQ ACHS [facility]; Protocol


   Last Admin: 08/08/18 12:34 Dose:  3 unit


Mirtazapine (Remeron -)  30 mg PO HS [facility]


Pantoprazole Sodium (Protonix -)  40 mg PO DAILY [facility]


   Last Admin: 08/08/18 10:11 Dose:  40 mg


Quetiapine Fumarate (Seroquel -)  200 mg PO HS [facility]


Tamsulosin HCl (Flomax -)  0.4 mg PO HS [facility]











- Objective


Vital Signs: 


 Vital Signs











Temperature  98.1 F   08/08/18 09:47


 


Pulse Rate  92 H  08/08/18 09:47


 


Respiratory Rate  18   08/08/18 09:47


 


Blood Pressure  135/84   08/08/18 09:47


 


O2 Sat by Pulse Oximetry (%)  100   08/08/18 09:47











Constitutional: Yes: Mild Distress


Eyes: Yes: WNL


HENT: Yes: WNL


Neck: Yes: WNL


Cardiovascular: Yes: WNL


Respiratory: Yes: WNL


Gastrointestinal: Yes: WNL


Genitourinary: Yes: WNL


Musculoskeletal: Yes: WNL


Extremities: Yes: WNL


Edema: No


Integumentary: Yes: WNL


Wound/Incision: Yes: Clean/Dry


Neurological: Yes: WNL


...Motor Strength: WNL


Psychiatric: Yes: WNL


Labs: 


 CBC, BMP





 08/08/18 08:30 





 08/08/18 08:30 











Problem List





- Problems


(1) Pre-syncope


Code(s): R55 - SYNCOPE AND COLLAPSE   





(2) EMILIE (acute kidney injury)


Code(s): N17.9 - ACUTE KIDNEY FAILURE, UNSPECIFIED   





(3) Alcoholic liver disease


Code(s): K70.9 - ALCOHOLIC LIVER DISEASE, UNSPECIFIED   





(4) Change in mental status


Code(s): R41.82 - ALTERED MENTAL STATUS, UNSPECIFIED   





Assessment/Plan





ARF RENAL EVAL


CARDIO WORKUP IN PROGRESS


FALL RISKS


ETOH?


CHECK LABS


PT EVAL

## 2018-08-09 NOTE — PN
Progress Note (short form)





- Note


Progress Note: 





Renal follow up for EMILIE





Pt seen and examined at the bedside


no acute complaints


denies any sob, cp, abd pain, N/V/D, dizziness or falls


tolerating oral diet


on IVF





 Vital Signs











Temperature  97.8 F   08/09/18 09:00


 


Pulse Rate  89   08/09/18 09:00


 


Respiratory Rate  17   08/09/18 09:00


 


Blood Pressure  130/79   08/09/18 09:00


 


O2 Sat by Pulse Oximetry (%)  98   08/09/18 03:00








 Intake & Output











 08/06/18 08/07/18 08/08/18 08/09/18





 23:59 23:59 23:59 23:59


 


Intake Total   800 1521


 


Output Total   770 200


 


Balance   30 1321


 


Weight  70.307 kg 74.435 kg 








NAD


RRR


CTA


soft NT/ND


no LE edema





 CBC, BMP





 08/09/18 06:35 





 08/09/18 06:35 





 Current Medications





Aspirin (Ecotrin -)  81 mg PO DAILY [facility]


   Last Admin: 08/09/18 11:27 Dose:  81 mg


Atorvastatin Calcium (Lipitor -)  20 mg PO HS [facility]


Bisacodyl (Dulcolax -)  5 mg PO BID PRN


   PRN Reason: CONSTIPATION


Chlordiazepoxide HCl (Librium -)  25 mg PO Q6H PRN


   PRN Reason: ANXIETY


   Stop: 08/14/18 23:59


Folic Acid (Folic Acid -)  1 mg PO DAILY [facility]


   Last Admin: 08/09/18 11:27 Dose:  1 mg


Gabapentin (Neurontin -)  400 mg PO BID [facility]


   Last Admin: 08/09/18 11:28 Dose:  400 mg


Heparin Sodium (Porcine) (Heparin -)  5,000 unit SQ TID [facility]


   Last Admin: 08/09/18 06:37 Dose:  Not Given


Sodium Chloride (Normal Saline -)  1,000 mls @ 83 mls/hr IV ASDIR [facility]


   Last Admin: 08/09/18 11:39 Dose:  83 mls/hr


Insulin Aspart (Novolog Vial Sliding Scale -)  1 vial SQ ACHS [facility]; Protocol


   Last Admin: 08/09/18 12:16 Dose:  4 unit


Insulin Detemir (Levemir Vial)  40 units SQ BID@0700,2200 [facility]


Mirtazapine (Remeron -)  30 mg PO HS [facility]


   Last Admin: 08/08/18 22:13 Dose:  30 mg


Pantoprazole Sodium (Protonix -)  40 mg PO DAILY [facility]


   Last Admin: 08/09/18 11:27 Dose:  40 mg


Quetiapine Fumarate (Seroquel -)  200 mg PO HS [facility]


   Last Admin: 08/08/18 23:13 Dose:  200 mg


Tamsulosin HCl (Flomax -)  0.4 mg PO HS [facility]


   Last Admin: 08/08/18 22:13 Dose:  0.4 mg


Thiamine HCl (Vitamin B1 Injection -)  200 mg IVPB Q8H-IV [facility]


   Stop: 08/11/18 18:01


   Last Admin: 08/09/18 11:30 Dose:  200 mg





 62 year old gentleman with PMhx of Hypertension, DM Type 2, GERD, MDD, ETOH 

abuse, CKD (recent admission with EMILIE) presents with confusion and unsteady 

gain with EMILIE.





#EMILIE (likely due to volume depletion +/- renal hypoprofusion with ACEi vs. BELKIS+

ACEi induced renal injury)


#Confusion/Unsteady gait 


#Hypertension 


#BPH/Urinary Incontinence 


#Hemeturia 











Renal function improving to near baseline


continue IVF for additional 24 hours 


hold ACEI


renal US showed no pathology 


trend renal function and electrolytes


discharge planning as per primary








Thank you 


Will follow





Michael Reid DO

## 2018-08-09 NOTE — CONSULT
Consult - text type





- Consultation


Consultation Note: 








NEUROLOGY CONSULTATION is greatly appreciated:





Events reviewed and discussed with Ms. RUTH Muñoz.


This 63 yo RH man with h/o DM, HTN, GERD, Alcoholism and psychiatric disease (? 

Bipolar)


Is maintained on:  Aspirin 81; Dulcolax; Gabapentin; Lisinopril; Mirtazapine 30 

mg; Pantoprazole; Quetiapine 200 mg PO HS; and Tamsulosin.


Apparently brought by family for agitation and disorganization , unsteady gait, 

after being released by [facility].





Pt gave little cogent history on admission but now describes gardening in 

Tuesday's heat and becoming dizzy and weak with blurred vision.


Denies ETOH x 5 years.





CT of head (reviewed): mild atrophy without traumatic lesions. 


Wic=082 mg%; Mg++=2.4 mg%; Z79=108 pg%; Ammonia=19 mg%; and TSH=1.46. Tox 

screen -





JEFFREY: Sleeping but awakens to name. No evidence of external head trauma. Neck 

supple. No bruits. Unkempt


NEURO: O x [facility], July, Thursday 2018. Trump. Recalls 1 of 3 @ 3


           No frontal release. Speech sparse but fluent


           CN II-XII: normal without nystagmus


           Motor: No drift, tremor or asterixis. Normal strength. Absent AJ's. 

Toes downgoing.


           Coord: No FTN dystaxia


           Sensory: Decreased vibration feet.


           Gait: slight shuffle.





IMP: Mild, Bilateral cerebral dysfunction (OMS) without focal findings.


       Mild peripheral neuropathy (DM/ETOH)


       Toxic-metabolic encephalopathy (etiology not immediately apparent).


       Suspect dehydration, non-compliance with meds, exacerbation of psychosis 

may be more likely than TIA.





SUGGEST: Additional history re: ETOH, Med compliance, Psyche f/u


               Agree with MRI of brain, Carotid duplex doppler.


               Psyche consultation.


               Continue parenchymal thiamine.





Thank you very much,


Felix Cao MD

## 2018-08-09 NOTE — CONSULT
Consult - text type





- Consultation


Consultation Note: 





Patient seen this morning.  Patient states he pulled his two toe nails off 

after pulling his sock on his right foot.  Patient is diabetic.  


vss, tmax 97.8





+onycholysis right foot nails 2&3, +tender dystrophic mycotic nails with 

subungual debris other 8 toes, +elongated,


-cellulitis, -ulceration, -drainage, decreased ns





onychomycosis


onycholysis


dm with neuropathy








Discussed diabetic foot care.  Debride nails tomorrow.  discussed need for 

compliance and proper shoe gear as well.

## 2018-08-09 NOTE — PN
Progress Note, Physician


Chief Complaint: 





AMS


History of Present Illness: 





NAD


alert and oriented


states he was working when he felt like he was dehydratyed. He came home took a 

shower and had a blackout for few seconds. His daughter has described the 

incident as he didn't know where he was and appeared confused.


His last LDL was 165. Not on any statin at home? with Uncontrolled Diabetes 

with LDL goal of <70 mg/dl. would repeat.





- Current Medication List


Current Medications: 


Active Medications





Aspirin (Ecotrin -)  81 mg PO DAILY [facility]


   Last Admin: 08/08/18 10:11 Dose:  81 mg


Atorvastatin Calcium (Lipitor -)  20 mg PO HS [facility]


Bisacodyl (Dulcolax -)  5 mg PO BID PRN


   PRN Reason: CONSTIPATION


Chlordiazepoxide HCl (Librium -)  25 mg PO Q6H PRN


   PRN Reason: ANXIETY


   Stop: 08/14/18 23:59


Folic Acid (Folic Acid -)  1 mg PO DAILY [facility]


   Last Admin: 08/08/18 10:11 Dose:  1 mg


Gabapentin (Neurontin -)  400 mg PO BID [facility]


   Last Admin: 08/08/18 22:13 Dose:  400 mg


Heparin Sodium (Porcine) (Heparin -)  5,000 unit SQ TID [facility]


   Last Admin: 08/09/18 06:37 Dose:  Not Given


Sodium Chloride (Normal Saline -)  1,000 mls @ 83 mls/hr IV ASDIR [facility]


   Last Admin: 08/08/18 15:43 Dose:  83 mls/hr


Insulin Aspart (Novolog Vial Sliding Scale -)  1 vial SQ ACHS [facility]; Protocol


   Last Admin: 08/09/18 06:36 Dose:  3 unit


Insulin Detemir (Levemir Vial)  40 units SQ BID [facility]


Mirtazapine (Remeron -)  30 mg PO [facility]


   Last Admin: 08/08/18 22:13 Dose:  30 mg


Pantoprazole Sodium (Protonix -)  40 mg PO DAILY [facility]


   Last Admin: 08/08/18 10:11 Dose:  40 mg


Quetiapine Fumarate (Seroquel -)  200 mg PO [facility]


   Last Admin: 08/08/18 23:13 Dose:  200 mg


Tamsulosin HCl (Flomax -)  0.4 mg PO [facility]


   Last Admin: 08/08/18 22:13 Dose:  0.4 mg


Thiamine HCl (Vitamin B1 Injection -)  200 mg IVPB Q8H-IV [facility]


   Stop: 08/11/18 18:01


   Last Admin: 08/09/18 02:36 Dose:  200 mg











- Objective


Vital Signs: 


 Vital Signs











Temperature  98.1 F   08/09/18 05:08


 


Pulse Rate  106 H  08/09/18 05:08


 


Respiratory Rate  20   08/09/18 05:08


 


Blood Pressure  121/74   08/09/18 05:08


 


O2 Sat by Pulse Oximetry (%)  98   08/09/18 03:00











Constitutional: Yes: Well Nourished, No Distress, Calm


Cardiovascular: Yes: Regular Rate and Rhythm


Respiratory: Yes: Regular


Gastrointestinal: Yes: Normal Bowel Sounds, Soft


Musculoskeletal: Yes: WNL


Extremities: Yes: WNL


Edema: No


Peripheral Pulses WNL: Yes


Integumentary: Yes: Other (Nail detachment 2nd and 3rd right toes. 

Onychomychosis on the rest)


Neurological: Yes: Alert, Oriented


Psychiatric: Yes: Alert, Oriented


Labs: 


 CBC, BMP





 08/09/18 06:35 





 08/09/18 06:35 











Problem List





- Problems


(1) EMILIE (acute kidney injury)


Assessment/Plan: 


-On IVF


-Holding ACE


-Nephrology on board


-Monitor trend


-renal U/S unremarkable


Code(s): N17.9 - ACUTE KIDNEY FAILURE, UNSPECIFIED   





(2) Change in mental status


Assessment/Plan: 


-CT head negative


-Seen by Neurology


-no focal deficits


-TIA workup- MRI brain+ U/S carotid


-Start statin


-On ASA


Code(s): R41.82 - ALTERED MENTAL STATUS, UNSPECIFIED   





(3) Diabetes


Assessment/Plan: 


-A1C at 9.5


-Levemir+Novolog sliding scale


-Diabetic diet


-RD consult


-Endocrinology consult


-BGM ACHS


Code(s): E11.9 - TYPE 2 DIABETES MELLITUS WITHOUT COMPLICATIONS   


Qualifiers: 


   Diabetes mellitus type: type 2 





(4) HLD (hyperlipidemia)


Assessment/Plan: 


-Atorvastatin 20 mg po HS


-repeat lipid profile outpatient


Code(s): E78.5 - HYPERLIPIDEMIA, UNSPECIFIED   





(5) Detachment of nail


Assessment/Plan: 


-podiatry consult


Code(s): L60.1 - ONYCHOLYSIS   





(6) Onychomycosis


Code(s): B35.1 - TINEA UNGUIUM   





Assessment/Plan





see problem list


DVT prophylaxis

## 2018-08-10 NOTE — CONSULT
Consult


Consult Specialty:: endocrine


Referred by:: fern car np


Reason for Consultation:: diabetes mellitus





- History of Present Illness


Chief Complaint: high sugars


History of Present Illness: 





62 year old male with a hx of NIDDM, HTN, GERD, MDD, alcohol abuse, cirrhosis 

presents to the hospital for altered mental status. He was brought by his 

daughter. Per record, patient had an argument with his daughter, who reported 

that he was not himself weak,unable to stand or walk without assist.he has not 

been checking blood sugars,he takes insulin but does not keep diet 

restriction.he has numbness and tingling in both feet. he has blurred vision 

and has diabetic eye disease.





- History Source


History Provided By: Patient





- Past Medical History


CNS: Yes: Seizure


Cardio/Vascular: Yes: HTN, Hyperlipdemia


Renal/: Yes: BPH


Psych: Yes: Addictions


Endocrine: Yes: Diabetes Mellitus


Additional Medical History: falls -> neck pain





- Alcohol/Substance Use


Hx Alcohol Use: Yes (pt states in distant past, denies other illicit drug use, 

denies cig use)


History of Substance Use: reports: None





- Smoking History


Smoking history: Unknown if ever smoked


Have you smoked in the past 12 months: No


Aproximately how many cigarettes per day: 0





- Social History


Usual Living Arrangement: With Spouse


History of Recent Travel: No





Home Medications





- Allergies


Allergies/Adverse Reactions: 


 Allergies











Allergy/AdvReac Type Severity Reaction Status Date / Time


 


No Known Allergies Allergy   Verified 08/07/18 18:28














- Home Medications


Home Medications: 


Ambulatory Orders





Aspirin [Aspirin EC] 81 mg PO DAILY 08/07/18 


Bisacodyl [Dulcolax] 5 mg PO BID PRN 08/07/18 


Folic Acid 1 mg PO DAILY 08/07/18 


Gabapentin [Neurontin] 400 mg PO BID 08/07/18 


Lisinopril 30 mg PO DAILY 08/07/18 


Mirtazapine [Remeron -] 30 mg PO DAILY 08/07/18 


Pantoprazole Sodium [Protonix] 40 mg PO DAILY 08/07/18 


Quetiapine Fumarate [Seroquel -] 200 mg PO HS 08/07/18 


Tamsulosin HCl [Flomax] 0.4 mg PO HS 08/07/18 











Review of Systems





- Review of Systems


Constitutional: reports: Lethargy, Weakness


Eyes: reports: Blurred Vision


HENT: reports: No Symptoms


Neck: reports: No Symptoms


Cardiovascular: reports: Shortness of Breath


Respiratory: reports: Exercise Intolerance, SOB on Exertion


Gastrointestinal: reports: Constipation


Genitourinary: reports: Frequency


Breasts: reports: No Symptoms Reported


Musculoskeletal: reports: Muscle Pain, Muscle Cramps, Muscle Weakness


Neurological: reports: Unsteady Gait, Weakness





Physical Exam


Vital Signs: 


 Vital Signs











Temperature  97.4 F L  08/09/18 23:44


 


Pulse Rate  80   08/09/18 23:44


 


Respiratory Rate  20   08/09/18 23:44


 


Blood Pressure  140/80   08/09/18 23:44


 


O2 Sat by Pulse Oximetry (%)  98   08/09/18 03:00











Constitutional: Yes: Calm


Eyes: Yes: EOM Intact


HENT: Yes: Normocephalic


Neck: Yes: Trachea Midline


Cardiovascular: Yes: Regular Rate and Rhythm


Respiratory: Yes: CTA Bilaterally


Gastrointestinal: Yes: Normal Bowel Sounds


...Rectal Exam: Yes: Deferred


Renal/: Yes: WNL


Breast(s): Yes: WNL


Musculoskeletal: Yes: Muscle Weakness


Extremities: Yes: Delayed Capillary Refill


Neurological: Yes: Alert, Oriented, Weakness


Labs: 


 CBC, BMP





 08/09/18 06:35 





 08/09/18 21:30 











Problem List





- Problems


(1) Type 2 diabetes mellitus with autonomic neuropathy


Code(s): E11.43 - TYPE 2 DIABETES W DIABETIC AUTONOMIC (POLY)NEUROPATHY   


Qualifiers: 


   Diabetes mellitus long term insulin use: with long term use   Qualified Code(

s): E11.43 - Type 2 diabetes mellitus with diabetic autonomic (poly)neuropathy; 

Z79.4 - Long term (current) use of insulin   





(2) EMILIE (acute kidney injury)


Code(s): N17.9 - ACUTE KIDNEY FAILURE, UNSPECIFIED   





(3) Detachment of nail


Code(s): L60.1 - ONYCHOLYSIS   





(4) Onychomycosis


Code(s): B35.1 - TINEA UNGUIUM   





(5) Alcohol dependence with uncomplicated withdrawal


Code(s): F10.230 - ALCOHOL DEPENDENCE WITH WITHDRAWAL, UNCOMPLICATED   





(6) Alcoholic liver disease


Code(s): K70.9 - ALCOHOLIC LIVER DISEASE, UNSPECIFIED   





(7) Asterixis


Code(s): R27.8 - OTHER LACK OF COORDINATION   





Assessment/Plan





Current Active Problems





EMILIE (acute kidney injury) (Acute)


Detachment of nail (Acute)


Onychomycosis (Acute)


Pre-syncope (Acute)


diabetes mellitus hyperglycemia


diabetic neuropathy


 Abnormal Lab Results











  08/09/18 08/09/18 08/09/18





  06:35 06:35 06:35


 


RBC  3.63 L  


 


Hgb  11.4 L  


 


Hct  33.0 L  


 


BUN   29 H 


 


Creatinine   1.4 H 


 


Random Glucose   221 H 


 


Hemoglobin A1c %    9.5 H D


 


Triglycerides   246 H 


 


Cholesterol   204 H 


 


Total LDL Cholesterol   136 H 


 


HDL Cholesterol   31 L 














  08/09/18





  21:30


 


RBC 


 


Hgb 


 


Hct 


 


BUN 


 


Creatinine 


 


Random Glucose  447 H* D


 


Hemoglobin A1c % 


 


Triglycerides 


 


Cholesterol 


 


Total LDL Cholesterol 


 


HDL Cholesterol 








 Laboratory Results - last 24 hr











  08/09/18 08/09/18 08/09/18





  05:56 06:35 06:35


 


WBC   6.6 


 


RBC   3.63 L 


 


Hgb   11.4 L 


 


Hct   33.0 L 


 


MCV   90.9 


 


MCH   31.5 


 


MCHC   34.7 


 


RDW   13.1 


 


Plt Count   186 


 


MPV   8.2 


 


Sodium    139


 


Potassium    5.0


 


Chloride    105


 


Carbon Dioxide    24


 


Anion Gap    10


 


BUN    29 H


 


Creatinine    1.4 H


 


Creat Clearance w eGFR    51.35


 


POC Glucometer  208  


 


Random Glucose    221 H


 


Hemoglobin A1c %   


 


Calcium    8.5


 


Total Bilirubin    0.3


 


AST    24


 


ALT    29


 


Alkaline Phosphatase    116  D


 


Total Protein    6.8


 


Albumin    3.5


 


Triglycerides    246 H


 


Cholesterol    204 H


 


Total LDL Cholesterol    136 H


 


HDL Cholesterol    31 L














  08/09/18 08/09/18 08/09/18





  06:35 06:35 11:33


 


WBC   


 


RBC   


 


Hgb   


 


Hct   


 


MCV   


 


MCH   


 


MCHC   


 


RDW   


 


Plt Count   


 


MPV   


 


Sodium   


 


Potassium   


 


Chloride   


 


Carbon Dioxide   


 


Anion Gap   


 


BUN   


 


Creatinine   


 


Creat Clearance w eGFR   


 


POC Glucometer    292


 


Random Glucose   


 


Hemoglobin A1c %   9.5 H D 


 


Calcium   


 


Total Bilirubin   


 


AST   


 


ALT   


 


Alkaline Phosphatase   


 


Total Protein   


 


Albumin   


 


Triglycerides  Cancelled  


 


Cholesterol  Cancelled  


 


Total LDL Cholesterol  Cancelled  


 


HDL Cholesterol  Cancelled  














  08/09/18 08/09/18





  21:07 21:30


 


WBC  


 


RBC  


 


Hgb  


 


Hct  


 


MCV  


 


MCH  


 


MCHC  


 


RDW  


 


Plt Count  


 


MPV  


 


Sodium  


 


Potassium  


 


Chloride  


 


Carbon Dioxide  


 


Anion Gap  


 


BUN  


 


Creatinine  


 


Creat Clearance w eGFR  


 


POC Glucometer  490 


 


Random Glucose   447 H* D


 


Hemoglobin A1c %  


 


Calcium  


 


Total Bilirubin  


 


AST  


 


ALT  


 


Alkaline Phosphatase  


 


Total Protein  


 


Albumin  


 


Triglycerides  


 


Cholesterol  


 


Total LDL Cholesterol  


 


HDL Cholesterol  








plan:


bgm qid novolog insulin dose


levemir 40 units am


levemir 40 units hs


diet nutrition consult


ck tsh free t4


ck b12 level


neurological consult

## 2018-08-10 NOTE — DS
Physical Examination


Vital Signs: 


 Vital Signs











Temperature  97.6 F   08/10/18 08:31


 


Pulse Rate  109 H  08/10/18 08:31


 


Respiratory Rate  18   08/10/18 08:31


 


Blood Pressure  108/66   08/10/18 08:31


 


O2 Sat by Pulse Oximetry (%)  99   08/10/18 03:00











Findings/Remarks: 





62 year old male with a hx of NIDDM, HTN, GERD, MDD, alcohol abuse, cirrhosis 

presents to the hospital for altered mental status. He was brought by his 

daughter, who is not present at bedside during the examination. Per record, 

patient had an argument with his daughter, who reported that he was not acting 

like himself and brought him to the ED. Patient reports that he fell earlier in 

the day, noting that he feels uneasy on his feet and felt dizzy. He 

additionally reports urinary incontinence. Denies chest pain, SOB, nausea, 

vomiting, diarrhea, fevers, chills, headache, blurry vision, changes to his 

vision. States that he had a similar presentation last year.





Constitutional: Yes: Well Nourished, No Distress, Calm


Cardiovascular: Yes: Regular Rate and Rhythm


Respiratory: Yes: Regular


Gastrointestinal: Yes: Normal Bowel Sounds, Soft


Musculoskeletal: Yes: WNL


Extremities: Yes: WNL


Edema: No


Peripheral Pulses WNL: Yes


Integumentary: Yes: Onychomycosis


Neurological: Yes: Alert, Oriented


Psychiatric: Yes: Alert, Oriented


Labs: 


 CBC, BMP





 08/10/18 07:19 





 08/10/18 07:19 











Discharge Summary


Reason For Visit: AMS


Current Active Problems





EMILIE (acute kidney injury) (Acute)


Detachment of nail (Acute)


Onychomycosis (Acute)


Pre-syncope (Acute)


Type 2 diabetes mellitus with autonomic neuropathy (Acute)








Hospital Course: 





U/S carotid:


Mild atherosclerotic disease with no evidence of hemodynamically significant 

stenoses. 





MRi brain without contrast:


No acute infarct is seen on diffusion-weighted images





U/S renal/Kidney:


Unremarkable examination 





CXR:


No evidence of active pulmonary disease. 





CT head without contrast:


No evidence of acute intracranial pathology. 


Condition: Stable





- Instructions


Diet, Activity, Other Instructions: 


-Start Atorvastatin 20 mg daily at bedtime


-Start Thiamine B1 1 gm daily in AM


-Continue Levemir 50 Units 2 x day


-Continue Novolog before meals as per sliding scale


150-199-2 units


200-249-4 units


250-299- 6 units


300-349- 8 units


350-400-10 units


>400- 12 units





-Follow up with PCP, Dr Adamson (Podiatry), Dr Mills (Diabetes) and Dr Reid (Kidney) outpatient within next 2 weeks.


Referrals: 


Raza Abbott [Primary Care Provider] - 


Kota Mills MD [Staff Physician] - 


Pallavi Adamson DPM [Staff Physician] - 


Disposition: VNS/HOME HEALTH CARE





- Home Medications


Comprehensive Discharge Medication List: 


Ambulatory Orders





Aspirin [Aspirin EC] 81 mg PO DAILY 08/07/18 


Bisacodyl [Dulcolax] 5 mg PO BID PRN 08/07/18 


Folic Acid 1 mg PO DAILY 08/07/18 


Gabapentin [Neurontin] 400 mg PO BID 08/07/18 


Lisinopril 30 mg PO DAILY 08/07/18 


Mirtazapine [Remeron -] 30 mg PO DAILY 08/07/18 


Pantoprazole Sodium [Protonix] 40 mg PO DAILY 08/07/18 


Quetiapine Fumarate [Seroquel -] 200 mg PO HS 08/07/18 


Tamsulosin HCl [Flomax] 0.4 mg PO HS 08/07/18 


Atorvastatin Ca [Lipitor] 20 mg PO HS #30 tablet 08/10/18 


Insulin (Levemir) [Levemir Vial] 50 units SQ BID@0700,2200  units 08/10/18 


Insulin Sliding Scale [Novolog Vial Sliding Scale -] 1 vial SQ ACHS  units 08/10

/18 


Thiamine HCl 1 gm MC DAILY #30 powder 08/10/18

## 2018-08-10 NOTE — PN
Progress Note, Physician


Chief Complaint: 





The patient seen in his room. feeling well. Wants to go home. 


No urinary complaints. 





History of Present Illness: 





62 year old gentleman with PMhx of Hypertension, DM Type 2, GERD, MDD, ETOH 

abuse, CKD 





- Current Medication List


Current Medications: 


Active Medications





Aspirin (Ecotrin -)  81 mg PO DAILY [facility]


   Last Admin: 08/10/18 09:54 Dose:  81 mg


Atorvastatin Calcium (Lipitor -)  20 mg PO [facility]


   Last Admin: 08/09/18 21:23 Dose:  20 mg


Bisacodyl (Dulcolax -)  5 mg PO BID PRN


   PRN Reason: CONSTIPATION


Chlordiazepoxide HCl (Librium -)  25 mg PO Q6H PRN


   PRN Reason: ANXIETY


   Stop: 08/14/18 23:59


Folic Acid (Folic Acid -)  1 mg PO DAILY [facility]


   Last Admin: 08/10/18 09:54 Dose:  1 mg


Gabapentin (Neurontin -)  400 mg PO BID [facility]


   Last Admin: 08/10/18 09:54 Dose:  400 mg


Heparin Sodium (Porcine) (Heparin -)  5,000 unit SQ TID [facility]


   Last Admin: 08/10/18 06:26 Dose:  Not Given


Sodium Chloride (Normal Saline -)  1,000 mls @ 83 mls/hr IV ASDIR [facility]


   Last Admin: 08/09/18 17:26 Dose:  Not Given


Insulin Aspart (Novolog Vial Sliding Scale -)  1 vial SQ ACHS [facility]; Protocol


   Last Admin: 08/10/18 12:13 Dose:  6 units


Insulin Detemir (Levemir Vial)  46 units SQ BID@0700,2200 [facility]


Lactic Acid (Lac-Hydrin 12)  1 applic TP DAILY PRN


   PRN Reason: DRY SKIN


Mirtazapine (Remeron -)  30 mg PO [facility]


   Last Admin: 08/09/18 21:23 Dose:  30 mg


Pantoprazole Sodium (Protonix -)  40 mg PO DAILY [facility]


   Last Admin: 08/10/18 09:54 Dose:  40 mg


Quetiapine Fumarate (Seroquel -)  200 mg PO [facility]


   Last Admin: 08/09/18 22:54 Dose:  200 mg


Tamsulosin HCl (Flomax -)  0.4 mg PO [facility]


   Last Admin: 08/09/18 21:23 Dose:  0.4 mg


Thiamine HCl (Vitamin B1 Injection -)  200 mg IVPB Q8H-IV [facility]


   Stop: 08/11/18 18:01


   Last Admin: 08/10/18 09:54 Dose:  200 mg











- Objective


Vital Signs: 


 Vital Signs











Temperature  97.6 F   08/10/18 08:31


 


Pulse Rate  109 H  08/10/18 08:31


 


Respiratory Rate  18   08/10/18 08:31


 


Blood Pressure  108/66   08/10/18 08:31


 


O2 Sat by Pulse Oximetry (%)  99   08/10/18 03:00











Constitutional: Yes: Well Nourished, No Distress


Eyes: Yes: Conjunctiva Clear


HENT: Yes: Normocephalic


Neck: Yes: Trachea Midline


Cardiovascular: Yes: Regular Rate and Rhythm, S1, S2


Respiratory: Yes: CTA Bilaterally, Diminished


Gastrointestinal: Yes: Normal Bowel Sounds, Soft


Genitourinary: No: Bladder Distention, CVA Tenderness - Left, CVA Tenderness - 

Right, Hematuria


Extremities: No: Amputation, Calf Tenderness


Edema: No


Neurological: Yes: Alert, Oriented


Labs: 


 CBC, BMP





 08/10/18 07:19 





 08/10/18 07:19 











Problem List





- Problems


(1) EMILIE (acute kidney injury)


Code(s): N17.9 - ACUTE KIDNEY FAILURE, UNSPECIFIED   





(2) Type 2 diabetes mellitus with autonomic neuropathy


Code(s): E11.43 - TYPE 2 DIABETES W DIABETIC AUTONOMIC (POLY)NEUROPATHY   


Qualifiers: 


   Diabetes mellitus long term insulin use: with long term use   Qualified Code(

s): E11.43 - Type 2 diabetes mellitus with diabetic autonomic (poly)neuropathy; 

Z79.4 - Long term (current) use of insulin   





(3) Delirium


Code(s): R41.0 - DISORIENTATION, UNSPECIFIED   





(4) Diabetes


Code(s): E11.9 - TYPE 2 DIABETES MELLITUS WITHOUT COMPLICATIONS   


Qualifiers: 


   Diabetes mellitus type: type 2 





(5) HLD (hyperlipidemia)


Code(s): E78.5 - HYPERLIPIDEMIA, UNSPECIFIED   





(6) Hypertension


Code(s): I10 - ESSENTIAL (PRIMARY) HYPERTENSION   


Qualifiers: 


 





(7) Weakness


Code(s): R53.1 - WEAKNESS   





Assessment/Plan





62 year old gentleman with PMhx of Hypertension, DM Type 2, GERD, MDD, ETOH 

abuse, CKDpresents with confusion and unsteady gain with EMILIE.





The Renal functions are fairly stable.


IV fluids well tolerated.





If discharged, will follow up as outpatient.





Thank you.








Meenakshi Whaley MD

## 2018-08-10 NOTE — PN
Progress Note, Physician


Chief Complaint: 





AMS


History of Present Illness: 





NAD


alert and oriented


states he was working when he felt like he was dehydratyed. He came home took a 

shower and had a blackout for few seconds. His daughter has described the 

incident as he didn't know where he was and appeared confused.


His last LDL was 165. Not on any statin at home? with Uncontrolled Diabetes 

with LDL goal of <70 mg/dl. would repeat.


All the workup is negative


His Cr trending down


Pt wants to go home


Seen by Nephrology, Podiatry and Endocrinology


Started on Statin





- Current Medication List


Current Medications: 


Active Medications





Aspirin (Ecotrin -)  81 mg PO DAILY [facility]


   Last Admin: 08/09/18 11:27 Dose:  81 mg


Atorvastatin Calcium (Lipitor -)  20 mg PO [facility]


   Last Admin: 08/09/18 21:23 Dose:  20 mg


Bisacodyl (Dulcolax -)  5 mg PO BID PRN


   PRN Reason: CONSTIPATION


Chlordiazepoxide HCl (Librium -)  25 mg PO Q6H PRN


   PRN Reason: ANXIETY


   Stop: 08/14/18 23:59


Folic Acid (Folic Acid -)  1 mg PO DAILY [facility]


   Last Admin: 08/09/18 11:27 Dose:  1 mg


Gabapentin (Neurontin -)  400 mg PO BID [facility]


   Last Admin: 08/09/18 21:23 Dose:  400 mg


Heparin Sodium (Porcine) (Heparin -)  5,000 unit SQ TID [facility]


   Last Admin: 08/10/18 06:26 Dose:  Not Given


Sodium Chloride (Normal Saline -)  1,000 mls @ 83 mls/hr IV ASDIR [facility]


   Last Admin: 08/09/18 17:26 Dose:  Not Given


Insulin Aspart (Novolog Vial Sliding Scale -)  1 vial SQ [facility]; Protocol


   Last Admin: 08/10/18 06:28 Dose:  7 units


Insulin Detemir (Levemir Vial)  46 units SQ BID@0700,2200 [facility]


Mirtazapine (Remeron -)  30 mg PO [facility]


   Last Admin: 08/09/18 21:23 Dose:  30 mg


Pantoprazole Sodium (Protonix -)  40 mg PO DAILY [facility]


   Last Admin: 08/09/18 11:27 Dose:  40 mg


Quetiapine Fumarate (Seroquel -)  200 mg PO [facility]


   Last Admin: 08/09/18 22:54 Dose:  200 mg


Tamsulosin HCl (Flomax -)  0.4 mg PO HS [facility]


   Last Admin: 08/09/18 21:23 Dose:  0.4 mg


Thiamine HCl (Vitamin B1 Injection -)  200 mg IVPB Q8H-IV [facility]


   Stop: 08/11/18 18:01


   Last Admin: 08/10/18 03:02 Dose:  200 mg











- Objective


Vital Signs: 


 Vital Signs











Temperature  97.6 F   08/10/18 08:31


 


Pulse Rate  109 H  08/10/18 08:31


 


Respiratory Rate  18   08/10/18 08:31


 


Blood Pressure  108/66   08/10/18 08:31


 


O2 Sat by Pulse Oximetry (%)  99   08/10/18 03:00











Constitutional: Yes: Well Nourished, No Distress, Calm


Cardiovascular: Yes: Regular Rate and Rhythm


Respiratory: Yes: Regular


Gastrointestinal: Yes: Normal Bowel Sounds, Soft


Musculoskeletal: Yes: WNL


Extremities: Yes: WNL


Edema: No


Peripheral Pulses WNL: Yes


Integumentary: Yes: Onychomycosis


Neurological: Yes: Alert, Oriented


Psychiatric: Yes: Alert, Oriented


Labs: 


 CBC, BMP





 08/10/18 07:19 





 08/10/18 07:19 











Problem List





- Problems


(1) EMILIE (acute kidney injury)


Assessment/Plan: 


-On IVF


-Holding ACE


-Nephrology on board


-Monitor trend


-renal U/S unremarkable


Code(s): N17.9 - ACUTE KIDNEY FAILURE, UNSPECIFIED   





(2) Change in mental status


Assessment/Plan: 


-CT head negative


-Seen by Neurology


-no focal deficits


-TIA workup- MRI brain+ U/S carotid-unremarkable


-Start statin


-On ASA


Code(s): R41.82 - ALTERED MENTAL STATUS, UNSPECIFIED   





(3) Diabetes


Assessment/Plan: 


-A1C at 9.5


-Levemir+Novolog sliding scale


-Diabetic diet


-RD consult


-Endocrinology consult


-BGM ACHS


Code(s): E11.9 - TYPE 2 DIABETES MELLITUS WITHOUT COMPLICATIONS   


Qualifiers: 


   Diabetes mellitus type: type 2 





(4) HLD (hyperlipidemia)


Assessment/Plan: 


-Atorvastatin 20 mg po HS


-repeat lipid profile outpatient


Code(s): E78.5 - HYPERLIPIDEMIA, UNSPECIFIED   





(5) Detachment of nail


Assessment/Plan: 


-podiatry consult


Code(s): L60.1 - ONYCHOLYSIS   





(6) Onychomycosis


Code(s): B35.1 - TINEA UNGUIUM   





Assessment/Plan





see problem list


DVT prophylaxis





Spoke to Neurology- possible causes of AMS- dehydration or transient 

encephalopathy with unknown etiology

## 2018-08-10 NOTE — PN
Progress Note (short form)





- Note


Progress Note: 


Patient seen for painful elongated thickened toe nails. 





+onycholysis right foot nails 2, +tender dystrophic mycotic nails with 

subungual debris other 8 toes, +elongated, no nail left 5th


-cellulitis, -ulceration, -drainage, decreased ns, +xerosis b/l feet





onychomycosis


onycholysis


dm with neuropathy








DNails debrided at bedside.  Ammonium lactate BID to feet and heels.

## 2018-09-13 NOTE — PDOC
Attending Attestation





- Resident


Resident Name: Gerber Stevens





- ED Attending Attestation


I have performed the following: I have examined & evaluated the patient, The 

case was reviewed & discussed with the resident, I agree w/resident's findings 

& plan, Exceptions are as noted





- HPI


HPI: 





09/13/18 03:48


Mr Lloyd is a 63 yo M h/o HTN, DMII, etoh abuse, cirrhosis, CKD prior 

admissions for AMS s/p d/c from hospital 1 month ago for AM.  Pt presents to 

the ER via EMS s/p a reported episode of alterations in mental status.


pt tells me that he got dizzy and blacked out


He denies chest pain, palpitations, headache, focal weakness or numbness


Of note, he told Dr. Stevens something different








- Physicial Exam


PE: 





09/13/18 03:51


Pt is currently at his behavioural baseline


He answers questions appropriately


Pt is A&O x 3 for me


He appears discheveled


RRR


CTA


No abd tenderness, protruberant abdomen


No lower extremity edema








- Medical Decision Making





09/13/18 03:57


Pt presents with AMS, which pt describes as transient LOC, now (Rapidly) 

resolved


DD:  syncope, seizure, Toxic-metabolic encephalopathy, 





Will do: Labs, CT, re assess


Pt reports medication complaince


Symptoms have completely resolved within 5-10 minutes of ER arrival


Pt requested a sandwich, given to him





09/13/18 03:58





09/13/18 04:40


 Laboratory Tests











  09/13/18 09/13/18 09/13/18





  03:28 03:28 03:28


 


WBC  7.7  


 


Hgb  13.7  


 


Hct  39.8  


 


Plt Count  207  


 


INR   1.10 H 


 


Sodium    136


 


Potassium    4.0


 


Chloride    101


 


Carbon Dioxide    28


 


BUN    23 H


 


Creatinine    1.7 H


 


Random Glucose    143 H D


 


Creatine Kinase Index    1.9


 


CK-MB (CK-2)    3.81 H


 


Troponin I    < 0.02











Will contact family for collateral information


09/13/18 07:35


I was able to speak with Amanda at his home


She states that the family has noted that he intermittently stares in to space 

and mubles


Pt did indeed pass out yesterday


She does not think he should leave without a head ct


This was ordered





Will discharge to home if head CT nml





Clinical Impression: Altered mental status (resolved)

## 2018-09-13 NOTE — EKG
Test Reason : 

Blood Pressure : ***/*** mmHG

Vent. Rate : 102 BPM     Atrial Rate : 102 BPM

   P-R Int : 174 ms          QRS Dur : 102 ms

    QT Int : 364 ms       P-R-T Axes : 045 010 013 degrees

   QTc Int : 474 ms

 

SINUS TACHYCARDIA

INCOMPLETE RIGHT BUNDLE BRANCH BLOCK

BORDERLINE ECG

WHEN COMPARED WITH ECG OF 07-AUG-2018 21:48,

NO SIGNIFICANT CHANGE WAS FOUND

Confirmed by DAGMAR GARCIA MD ([id number]) on 9/13/2018 3:39:26 PM

 

Referred By:             Confirmed By:DAGMAR GARCIA MD

## 2018-09-13 NOTE — PDOC
*Physical Exam





- Vital Signs


 Last Vital Signs











Temp Pulse Resp BP Pulse Ox


 


 97.8 F   107 H  15   147/99   99 


 


 09/13/18 07:26  09/13/18 07:26  09/13/18 07:26  09/13/18 07:26  09/13/18 07:26














- Physical Exam


General Appearance: Yes: Nourished


Respiratory/Chest: positive: Lungs Clear, Normal Breath Sounds


Cardiovascular: positive: Regular Rhythm, Regular Rate, S1, S2


Musculoskeletal: positive: Normal Inspection


Neurologic: positive: Fully Oriented, Alert, Normal Mood/Affect, Motor Strength 

5/5, Other (gait normal)





ED Treatment Course





- LABORATORY


CBC & Chemistry Diagram: 


 09/13/18 03:28





 09/13/18 03:28





- ADDITIONAL ORDERS


Additional order review: 


 Laboratory  Results











  09/13/18 09/13/18 09/13/18





  03:28 03:28 03:28


 


PT with INR   


 


INR   


 


PTT (Actin FS)  32.3  


 


Sodium    136


 


Potassium    4.0


 


Chloride    101


 


Carbon Dioxide    28


 


Anion Gap    7 L


 


BUN    23 H


 


Creatinine    1.7 H


 


Creat Clearance w eGFR    41.04


 


Random Glucose    143 H D


 


Lactic Acid   0.8 


 


Calcium    9.2


 


Total Bilirubin    0.3


 


AST    28


 


ALT    37


 


Alkaline Phosphatase    171 H


 


Creatine Kinase    191


 


Creatine Kinase Index    1.9


 


CK-MB (CK-2)    3.81 H


 


Troponin I    < 0.02


 


Total Protein    8.9 H


 


Albumin    4.5














  09/13/18





  03:28


 


PT with INR  12.40


 


INR  1.10 H


 


PTT (Actin FS) 


 


Sodium 


 


Potassium 


 


Chloride 


 


Carbon Dioxide 


 


Anion Gap 


 


BUN 


 


Creatinine 


 


Creat Clearance w eGFR 


 


Random Glucose 


 


Lactic Acid 


 


Calcium 


 


Total Bilirubin 


 


AST 


 


ALT 


 


Alkaline Phosphatase 


 


Creatine Kinase 


 


Creatine Kinase Index 


 


CK-MB (CK-2) 


 


Troponin I 


 


Total Protein 


 


Albumin 








 











  09/13/18





  03:28


 


RBC  4.44


 


MCV  89.6


 


MCHC  34.4


 


RDW  13.0


 


MPV  8.2














Medical Decision Making





- Medical Decision Making





09/13/18 08:05


63 yo male with h/o htn dm cirrhosis, here with episode of AMS. per pt he 

states he fell at home.  lives with his daughter. fall was unwitness. on 

initial evaluation pt was confused, nonsensical.  however this am pt is awake 

alert oriented x 3. ambulating without difficulty. denies intox. or seizure 

history. pt states he want to go home. explained concenrs regarding possibility 

of seizure. pt is aware and refusing to stay. called daughter who states she 

can not pay for a taxi, did not witness fall. agrees pt no longer drinking 

alcohol. review pt records several similar episodes, pt has had multiple ct of 

head most recently 2 weeks ago.  due to concerns for initial ams, repeat head 

ct was ordered by prior physician.  awaiting results. 


09/13/18 08:53


pt head ct negative.  will dc to home. pt states he would like to walk home if 

his daughter can not pay for a cab.


09/13/18 08:54








*DC/Admit/Observation/Transfer


Diagnosis at time of Disposition: 


 Syncope and collapse








- Discharge Dispostion


Disposition: HOME


Condition at time of disposition: Improved


Decision to Admit order: No





- Referrals


Referrals: 


Milena Vang MD [Primary Care Provider] - 





- Patient Instructions


Printed Discharge Instructions:  DI for Syncope in Adults (Fainting)


Additional Instructions: 


you should follow up with dr. higuera, call to schedule. 





- Post Discharge Activity

## 2018-09-13 NOTE — PDOC
History of Present Illness





- General


Chief Complaint: Altered Mental Status


Stated Complaint: AMS


Time Seen by Provider: 09/13/18 02:32





- History of Present Illness


Initial Comments: 


The patient is a 62M who presents by EMS from home for reported altered mental 

status. The patient was recently discharged on 8/10/2018 for a similar 

presentation. The patient is noted to have AMS at baseline; however, per EMS 

his family states that he is more altered than usual. The patient is currently 

oriented to person only and no family is at bedside. They are reportedly on 

their way.


09/13/18 02:48








Past History





- Past Medical History


Allergies/Adverse Reactions: 


 Allergies











Allergy/AdvReac Type Severity Reaction Status Date / Time


 


No Known Allergies Allergy   Verified 09/13/18 02:31











Home Medications: 


Ambulatory Orders





Aspirin [Aspirin EC] 81 mg PO DAILY 08/07/18 


Bisacodyl [Dulcolax] 5 mg PO BID PRN 08/07/18 


Folic Acid 1 mg PO DAILY 08/07/18 


Gabapentin [Neurontin] 400 mg PO BID 08/07/18 


Lisinopril 30 mg PO DAILY 08/07/18 


Mirtazapine [Remeron -] 30 mg PO DAILY 08/07/18 


Pantoprazole Sodium [Protonix] 40 mg PO DAILY 08/07/18 


Quetiapine Fumarate [Seroquel -] 200 mg PO HS 08/07/18 


Tamsulosin HCl [Flomax] 0.4 mg PO HS 08/07/18 


Atorvastatin Ca [Lipitor] 20 mg PO HS #30 tablet 08/10/18 


Insulin (Levemir) [Levemir Vial] 50 units SQ BID@0700,2200  units 08/10/18 


Insulin Sliding Scale [Novolog Vial Sliding Scale -] 1 vial SQ ACHS  units 08/10

/18 


Thiamine HCl 1 gm MC DAILY #30 powder 08/10/18 








CVA: Yes


COPD: No


DVT: No


Diabetes: Yes


Dialysis: Yes


HTN: Yes


Liver Disease: Yes


Psychiatric Problems: Yes


Seizures: Yes





- Immunization History


Immunization Up to Date: Yes





- Suicide/Smoking/Psychosocial Hx


Smoking Status: No


Smoking History: Unknown if ever smoked


Have you smoked in the past 12 months: No


Number of Cigarettes Smoked Daily: 0


Cigars Per Day: 0


Information on smoking cessation initiated: No


Hx Alcohol Use: No


Drug/Substance Use Hx: No


Substance Use Type: None


Hx Substance Use Treatment: No





**Review of Systems





- Review of Systems


Able to Perform ROS?: No (2/2 medical condition )





*Physical Exam





- Vital Signs


 Last Vital Signs











Temp Pulse Resp BP Pulse Ox


 


 98.2 F   107 H  18   151/115   100 


 


 09/13/18 02:32  09/13/18 02:32  09/13/18 02:32  09/13/18 02:32  09/13/18 02:32














- Physical Exam


Comments: 





GENERAL: Awake, alert, and oriented to person only, in no acute distress


HEAD: No signs of trauma, normocephalic, atraumatic 


EYES: PERRL, EOMI, R pterygium, sclera anicteric, conjunctiva clear


ENT: Hearing grossly normal, nares patent, oropharynx clear without exudates. 

Moist mucosa


NECK: Normal ROM, supple, no lymphadenopathy


LUNGS: No distress, speaks full sentences, clear to auscultation bilaterally


HEART:Regular rate and rhythm, normal S1 and S2, no murmurs appreciated, 

peripheral pulses normal and equal bilaterally


ABDOMEN: Soft, nontender, normoactive bowel sounds.  No guarding, no rebound


EXTREMITIES : Normal inspection, Normal range of motion, no edema.


NEUROLOGICAL: Cranial nerves II through XII grossly intact.  Tangential speech, 

no focal sensorimotor deficits _





09/13/18 02:50








ED Treatment Course





- LABORATORY


CBC & Chemistry Diagram: 


 09/13/18 03:28





 09/13/18 03:28





- RADIOLOGY


Radiology Studies Ordered: 














 Category Date Time Status


 


 CHEST PA & LAT [RAD] Stat Radiology  09/13/18 02:45 Ordered














Medical Decision Making





- Medical Decision Making


The patient is a 62M with a history of EtOH abuse, seizure d/o, wernicke's 

encephalopathy who presents from home with reported worsening of altered mental 

status





ED Course


CMP, CBC, Cardiac Profile, PT/PTT/INR, UA/UCx, Blood Cx, Lactate


ECG, CXR


Patient's family is reportedly on their way


09/13/18 02:51





Patient w/ improved mental status without intervention. Patient is now A&Ox3 

and responding appropriately to questions. States that he lives at home with 

his wife and son. They are not expected to come to the hospital. Patient states 

that we may call his family closer to 0600 for collateral information.


09/13/18 04:32





Trop I neg


No leukocytosis


CXR w/o PNA, PNX, air under the diaphragm, or mediastinal widening


Cr 1.7, but is at baseline


Lactate 0.8


09/13/18 04:35





CT Head to r/o bleed


EtOH level


Discussed w/ patient that he may have had a seizure


Plan for D/C home w/ wife and son if negative


09/13/18 07:16

## 2019-01-23 NOTE — PDOC
History of Present Illness





- General


Chief Complaint: Altered Mental Status


Stated Complaint: AMS


Time Seen by Provider: 01/23/19 07:24





- History of Present Illness


Initial Comments: 





01/23/19 07:54


The patient is a 50 year old male with a significant PMH of HTN, DMII, cirrhosis

, CKD, who presents to the emergency department with slurred speech and bizarre 

behavior since approx midnight. Per daughter, pt was in his USOH yesterday. 

However, at midnight, he began to slur his speech. This was followed by 

apparent confusion and odd behavior, including unplugging the TV for no reason 

and putting his clothes on backwards. This morning, pt was persistently 

confused and agitated, prompting daughter to call EMS. Daughter notes that pt 

has had multiple similar episodes in the past. He was seen in this ED 

previously for similar complaint. Daughter states that they never figured out 

why he has these episodes.


In ED, pt is AnOx3. Patient states he notices his speech is different, but 

attributes it to having a dry mouth. Patient denies any recent alcohol or drug 

use. Denies any weakness/numbness in any extremity. Denies HA/N/V. No 

dizziness. Denies any other complaints.


 


The patient denies numbness/tingling/weakness, chest pain, shortness of breath, 

headache and dizziness.


Denies fever, chills, nausea, vomit, diarrhea and constipation.


Denies urinary symptoms. 


 


Allergies: NKA


Past surgical history: None reported. 


Social history: Denies toxic habits. 








NIH Stroke Scale





- Last Known Well Date/Time & Onset


Date Last Known Well: 01/23/19


Time Last Known Well: 00:00





- Initial Evaluation


Level of consciousness: Alert


Ask patient the month and their age: Answers both correctly


Ask patient to open & close eyes; make fist and let go: Obeys both correctly


Best gaze (horizontal eye movement): Normal


Visual field testing: No visual field loss


Facial paresis (Show teeth/raise eyebrows/close eyes tight): Normal symmetrical 

movement


Motor Function: Left Arm: Normal


Motor Function:  Right Arm: Normal (extends arm 90 (or 45) degrees for 10 

seconds without drift


Motor Function:  Left Leg: Normal (extends leg 30 degrees for 5 seconds without 

drift)


Motor Function:  Right Leg: Normal (extends leg 30 degrees for 5 seconds 

without drift)


Limb Ataxia: No ataxia


Sensory(Use pinprick test arms,legs,trunk,face/side to side): Normal


Best language (Describe picture, name items, read sentences): No Aphasia


Dysarthria (read several words): Mild to moderate slurring of words


Extinction and Inattention: No abnormality





- Total Score


NIH Stroke Scale Score: 1





Past History





- Past Medical History


Allergies/Adverse Reactions: 


 Allergies











Allergy/AdvReac Type Severity Reaction Status Date / Time


 


No Known Allergies Allergy   Verified 09/13/18 02:31











Home Medications: 


Ambulatory Orders





Aspirin [Aspirin EC] 81 mg PO DAILY 08/07/18 


Bisacodyl [Dulcolax] 5 mg PO BID PRN 08/07/18 


Folic Acid 1 mg PO DAILY 08/07/18 


Gabapentin [Neurontin] 400 mg PO BID 08/07/18 


Lisinopril 30 mg PO DAILY 08/07/18 


Mirtazapine [Remeron -] 30 mg PO DAILY 08/07/18 


Pantoprazole Sodium [Protonix] 40 mg PO DAILY 08/07/18 


Quetiapine Fumarate [Seroquel -] 200 mg PO HS 08/07/18 


Tamsulosin HCl [Flomax] 0.4 mg PO HS 08/07/18 


Atorvastatin Ca [Lipitor] 20 mg PO HS #30 tablet 08/10/18 


Insulin (Levemir) [Levemir Vial] 50 units SQ BID@0700,2200  units 08/10/18 


Insulin Sliding Scale [Novolog Vial Sliding Scale -] 1 vial SQ ACHS  units 08/10

/18 


Thiamine HCl 1 gm MC DAILY #30 powder 08/10/18 


Nitrofurantoin Monohyd/M-Cryst [Macrobid -] 100 mg PO BID #14 capsule 09/16/18 








CVA: Yes


COPD: No


DVT: No


Diabetes: Yes


Dialysis: Yes


HTN: Yes


Liver Disease: Yes


Psychiatric Problems: Yes


Seizures: Yes





- Immunization History


Immunization Up to Date: Yes





- Suicide/Smoking/Psychosocial Hx


Smoking Status: No


Smoking History: Unknown if ever smoked


Have you smoked in the past 12 months: No


Number of Cigarettes Smoked Daily: 0


Cigars Per Day: 0


Information on smoking cessation initiated: No


Hx Alcohol Use: No


Drug/Substance Use Hx: No


Substance Use Type: None


Hx Substance Use Treatment: No





**Review of Systems





- Review of Systems


Comments:: 





01/23/19 08:06


GENERAL/CONSTITUTIONAL: No fever or chills. No weakness.


HEAD, EYES, EARS, NOSE AND THROAT: No change in vision. No ear pain or 

discharge. No sore throat.


CARDIOVASCULAR: No chest pain, no shortness of breath, no loss of consciousness


RESPIRATORY: No cough, wheezing, or hemoptysis.


GASTROINTESTINAL: No nausea, vomiting, diarrhea or constipation.


GENITOURINARY: No dysuria, frequency, or change in urination.


MUSCULOSKELETAL: No joint or muscle swelling or pain. No neck or back pain.


SKIN: No rash


NEUROLOGIC: No vertigo, no change in strength/sensation. (+) slurred speech. 


ENDOCRINE: No increased thirst. No abnormal weight change.


HEMATOLOGIC/LYMPHATIC: No anemia, easy bleeding, or history of blood clots.


ALLERGIC/IMMUNOLOGIC: No hives or skin allergy.





*Physical Exam





- Vital Signs


 Last Vital Signs











Temp Pulse Resp BP Pulse Ox


 


 97.6 F   103 H  16   134/89   100 


 


 01/23/19 07:22  01/23/19 07:22  01/23/19 07:22  01/23/19 07:22  01/23/19 07:22














- Physical Exam


Comments: 





01/23/19 08:07


GENERAL: Awake, alert, and fully oriented, in no acute distress.


HEAD: No signs of trauma


EYES: PERRLA, EOMI, sclera anicteric, conjunctiva clear


ENT: Auricles normal inspection, hearing grossly normal, nares patent, 

oropharynx clear without exudates. Moist mucosa


NECK: Nontender, no stepoffs, Normal ROM, supple, no lymphadenopathy, JVD, or 

masses


LUNGS: Breath sounds equal, clear to auscultation bilaterally.  No wheezes, and 

no crackles


HEART: Regular rate and rhythm, normal S1 and S2, no murmurs, rubs or gallops


ABDOMEN: Soft, nontender, normoactive bowel sounds.  No guarding, no rebound.  

No masses


EXTREMITIES: Normal range of motion, no edema.  No clubbing or cyanosis. No 

cords, erythema, or tenderness


NEUROLOGICAL: + mild dysarthria, Cranial nerves II through XII intact. 5/5 

strength and sensation in all extremities, Normal speech, normal gait, normal 

cerebellar function


SKIN: Warm, Dry, normal turgor, no rashes or lesions noted.





Moderate Sedation





- Procedure Monitoring


Vital Signs: 


Procedure Monitoring Vital Signs











Temperature  97.6 F   01/23/19 07:22


 


Pulse Rate  103 H  01/23/19 07:22


 


Respiratory Rate  16   01/23/19 07:22


 


Blood Pressure  134/89   01/23/19 07:22


 


O2 Sat by Pulse Oximetry (%)  100   01/23/19 07:22











**Heart Score/ECG Review





- ECG Impressions


Comment:: 





01/23/19 08:07


Sinus tachycardia, no ESTELA/STDs, no TWIs, axis wnl intervals wnl, rate 102





ED Treatment Course





- LABORATORY


CBC & Chemistry Diagram: 


 01/23/19 07:55





 01/23/19 07:55





- RADIOLOGY


Radiology Studies Ordered: 














 Category Date Time Status


 


 HEAD CT WITHOUT CONTRAST [CT] Stat CT Scan  01/23/19 07:40 Ordered


 


 CHEST PA & LAT [RAD] Stat Radiology  01/23/19 07:39 Ordered














Medical Decision Making





- Critical Care Time


Total Critical Care Time (minutes): 60


Critical Care Statement: The care of this patient involved high complexity 

decision making to prevent further life threatening deterioration of the patient

's condition and/or to evaluate & treat vital organ system(s) failure or risk 

of failure.





- Medical Decision Making





01/23/19 08:08


62 M with acute onset slurred speech and confusion. Last known normal 12AM this 

morning. Pt outside tPA window. In ED, pt is fully oriented, though still 

exhibiting some slurred speech, which he admits is not his baseline. No other 

neuro deficits. NIHSS 1. Pt has had multiple similar episodes in the past. Will 

r/o acute CVA/TIA. Also consider delirium giving waxing and waning nature. Will 

r/o metabolic encephalopathy, has h/o cirrhosis. Possible underlying psychotic 

disorder.


- Labs, ETOH, Utox


- CT head


- CXR, UA


- Neuro consult





01/23/19 10:24


Labs wnl, ETOH negative


Ammonia negative





CTH wnl





Dr. Castillo consulted, recommends MRI w/ gadolinium to evaluate for 

encephalopathy. Also would like EEG to r/o partial seizures





01/23/19 11:42


Pt admitted to Dr. Wallace





*DC/Admit/Observation/Transfer


Diagnosis at time of Disposition: 


 Altered mental status, Dysarthria








- Discharge Dispostion


Condition at time of disposition: Stable


Decision to Admit order: Yes





- Referrals





- Patient Instructions





- Post Discharge Activity





- Attestations


Physician Attestion: 





01/23/19 11:43








I, Dr. Yomi Li MD, attest that this document has been prepared under my 

direction and personally reviewed by me in its entirety.   I further attest, 

that it accurately reflects all work, treatment, procedures and medical decision

-making performed by me.

## 2019-01-23 NOTE — EKG
Test Reason : 

Blood Pressure : ***/*** mmHG

Vent. Rate : 102 BPM     Atrial Rate : 102 BPM

   P-R Int : 174 ms          QRS Dur : 096 ms

    QT Int : 356 ms       P-R-T Axes : 061 022 025 degrees

   QTc Int : 463 ms

 

SINUS TACHYCARDIA

OTHERWISE NORMAL ECG

WHEN COMPARED WITH ECG OF 13-SEP-2018 03:15,

INCOMPLETE RIGHT BUNDLE BRANCH BLOCK IS NO LONGER PRESENT

Confirmed by USMAN RAMIREZ, HIREN ([id number]) on 1/23/2019 1:18:55 PM

 

Referred By:             Confirmed By:HIREN MARY MD

## 2019-01-23 NOTE — HP
Admitting History and Physical





- Primary Care Physician


PCP: Matteo Wallace





- Admission


Chief Complaint: Altered mental status


History of Present Illness: 





The patient is a 50 year old male with a significant PMH of HTN, DMII, cirrhosis

, CKD, who presents to the emergency department with slurred speech and bizarre 

behavior since approx midnight. Per daughter, pt was in his USOH yesterday. 

However, at midnight, he began to slur his speech. This was followed by 

apparent confusion and odd behavior, including unplugging the TV for no reason 

and putting his clothes on backwards. This morning, pt was persistently 

confused and agitated, prompting daughter to call EMS. Daughter notes that pt 

has had multiple similar episodes in the past. He was seen in this ED 

previously for similar complaint. Daughter states that they never figured out 

why he has these episodes.


In ED, pt is AnOx3. Patient states he notices his speech is different, but 

attributes it to having a dry mouth. Patient denies any recent alcohol or drug 

use. Denies any weakness/numbness in any extremity. Denies HA/N/V. No 

dizziness. Denies any other complaints.


 


The patient denies numbness/tingling/weakness, chest pain, shortness of breath, 

headache and dizziness.


Denies fever, chills, nausea, vomit, diarrhea and constipation.


Denies urinary symptoms. 





- Past Medical History


CNS: Yes: Seizure


Cardiovascular: Yes: HTN, Hyperlipdemia


Renal/: Yes: BPH


Psych: Yes: Addictions


Endocrine: Yes: Diabetes Mellitus





- Smoking History


Smoking history: Unknown if ever smoked


Have you smoked in the past 12 months: No


Aproximately how many cigarettes per day: 0





- Alcohol/Substance Use


Hx Alcohol Use: No


History of Substance Use: reports: None





- Social History


History of Recent Travel: No





Home Medications





- Allergies


Allergies/Adverse Reactions: 


 Allergies











Allergy/AdvReac Type Severity Reaction Status Date / Time


 


No Known Allergies Allergy   Verified 09/13/18 02:31














- Home Medications


Home Medications: 


Ambulatory Orders





Aspirin [Aspirin EC] 81 mg PO DAILY 08/07/18 


Bisacodyl [Dulcolax] 5 mg PO BID PRN 08/07/18 


Folic Acid 1 mg PO DAILY 08/07/18 


Gabapentin [Neurontin] 400 mg PO BID 08/07/18 


Lisinopril 30 mg PO DAILY 08/07/18 


Mirtazapine [Remeron -] 30 mg PO DAILY 08/07/18 


Pantoprazole Sodium [Protonix] 40 mg PO DAILY 08/07/18 


Quetiapine Fumarate [Seroquel -] 200 mg PO HS 08/07/18 


Tamsulosin HCl [Flomax] 0.4 mg PO HS 08/07/18 


Atorvastatin Ca [Lipitor] 20 mg PO HS #30 tablet 08/10/18 


Insulin (Levemir) [Levemir Vial] 50 units SQ BID@0700,2200  units 08/10/18 


Insulin Sliding Scale [Novolog Vial Sliding Scale -] 1 vial SQ ACHS  units 08/10

/18 


Thiamine HCl 1 gm MC DAILY #30 powder 08/10/18 


Nitrofurantoin Monohyd/M-Cryst [Macrobid -] 100 mg PO BID #14 capsule 09/16/18 











Physical Examination


Vital Signs: 


 Vital Signs











Temperature  97.6 F   01/23/19 07:22


 


Pulse Rate  103 H  01/23/19 07:22


 


Respiratory Rate  16   01/23/19 07:22


 


Blood Pressure  134/89   01/23/19 07:22


 


O2 Sat by Pulse Oximetry (%)  100   01/23/19 07:22











Labs: 


 CBC, BMP





 01/23/19 07:55 





 01/23/19 07:55

## 2019-01-24 NOTE — DS
Physical Examination


Vital Signs: 


 Vital Signs











Temperature  97.6 F   01/24/19 07:49


 


Pulse Rate  84   01/24/19 07:49


 


Respiratory Rate  18   01/24/19 07:49


 


Blood Pressure  115/74   01/24/19 07:49


 


O2 Sat by Pulse Oximetry (%)  99   01/24/19 07:49











Constitutional: Yes: Calm


Cardiovascular: Yes: Regular Rate and Rhythm, S1, S2


Respiratory: Yes: CTA Bilaterally


Gastrointestinal: Yes: Normal Bowel Sounds, Soft


Edema: No


Neurological: Yes: Alert, Oriented


Labs: 


 CBC, BMP





 01/23/19 07:55 





 01/24/19 06:00 











Discharge Summary


Reason For Visit: AMS


Current Active Problems





Change in mental status (Acute)


Dysarthria (Acute)








Other Procedures: head CT and Brain MRI is normal


Hospital Course: 





 Primary Care Physician


PCP: Matteo Wallace





- Admission


Chief Complaint: Altered mental status


History of Present Illness: 





The patient is a 50 year old male with a significant PMH of HTN, DMII, cirrhosis

, CKD, who presents to the emergency department with slurred speech and bizarre 

behavior since approx midnight. Per daughter, pt was in his USOH yesterday. 

However, at midnight, he began to slur his speech. This was followed by 

apparent confusion and odd behavior, including unplugging the TV for no reason 

and putting his clothes on backwards. This morning, pt was persistently 

confused and agitated, prompting daughter to call EMS. Daughter notes that pt 

has had multiple similar episodes in the past. He was seen in this ED 

previously for similar complaint. Daughter states that they never figured out 

why he has these episodes.


In ED, pt is AnOx3. Patient states he notices his speech is different, but 

attributes it to having a dry mouth. Patient denies any recent alcohol or drug 

use. Denies any weakness/numbness in any extremity. Denies HA/N/V. No 

dizziness. Denies any other complaints.


 


The patient denies numbness/tingling/weakness, chest pain, shortness of breath, 

headache and dizziness.


Denies fever, chills, nausea, vomit, diarrhea and constipation.








patient admitted to telemetry\had ct scan and MRI which was normal


patient does not want to stay any longer in hospital


i explained to him he needs to be seen by neurologist and get EEG done but he 

doesnot want to wait


signed out AMA


Condition: Stable





- Instructions


Disposition: AGAINST MEDICAL ADVICE





- Home Medications


Comprehensive Discharge Medication List: 


Ambulatory Orders





Aspirin [Aspirin EC] 81 mg PO DAILY 08/07/18 


Bisacodyl [Dulcolax] 5 mg PO TID PRN 08/07/18 


Folic Acid 1 mg PO DAILY 08/07/18 


Gabapentin [Neurontin] 400 mg PO BID 08/07/18 


Lisinopril 30 mg PO DAILY 08/07/18 


Mirtazapine [Remeron -] 30 mg PO DAILY 08/07/18 


Pantoprazole Sodium [Protonix] 40 mg PO DAILY 08/07/18 


Quetiapine Fumarate [Seroquel -] 25 mg PO HS 08/07/18 


Tamsulosin HCl [Flomax] 0.4 mg PO HS 08/07/18 


Atorvastatin Ca [Lipitor] 80 mg PO HS 01/23/19 


Ergocalciferol (Vitamin D2) [Vitamin D2] 50,000 unit PO WEEKLY 01/23/19 


Escitalopram Oxalate [Lexapro -] 10 mg PO DAILY 01/23/19 


Insulin Glargine,Hum.rec.anlog [Basaglar Kwikpen U-100] 100 unit SQ 1800 01/23/ 19 


Insulin Lispro [Humalog] 10 - 15 unit SQ ACHS PRN 01/23/19 


Nifedipine [Nifedipine ER] 90 mg PO DAILY 01/23/19 


Oxycodone HCl/Acetaminophen [Percocet 5-325 mg Tablet] 1 tab PO Q4H PRN 01/23/ 19 


Thiamine Mononitrate [Vitamin B-1] 100 mg PO DAILY 01/23/19

## 2019-01-24 NOTE — CONSULT
Consult - text type





- Consultation


Consultation Note: 


Both Dr. Cao and our group was asked to see this patient, and after 

discussion with Dr. Cao, he will see this patient.  


Please call if we can be of assistance.


Thanks.

## 2019-01-24 NOTE — CONSULT
Consult - text type





- Consultation


Consultation Note: 








NEUROLOGY CONSULTATION is greatly appreciated:





This 61 yo RH man with HTN, DM, depression and Cirrhosis claims no ETOH since 

2010.


On Lisinopril, pantoprazole, atorvastatin, tamsulosin, Insulins, mirtazepine, 

quetiapine (200 mg HS), gabapentin(400 mg BID), and Macrobid (?)


Admitted after few days of confusion and bizarre behavior.





E36=861 pg%; TSH= 1.13; RPR -


CT of head (reviewed); Mild atrophy. No acute changes


MRI of brain (reviewed): Moderate, diffuse atrophy, diffuse, periventricular 

microvascular changes.


Urinary WBC= 24. Ammonia normal





JEFFREY: No evidence of external head trauma. Cor reg. No bruits. Neck supple.


NEURO: Awake, alert, anxious.


           Oriented x [facility], January, 2019. Trump. Poor reversals. Recalls 1 of 

3 @ 3 mins. + glabella. Sparse fluent speech.


           CN II-XII: Normal without Nystagmus.


           Motor: No drift. + occ myoclonic jerks/ asterixis. Normal strength. 

Brisk reflexes except AJ's. Toes downgoing.


           Coord: No FTN dystaxia.


           Sensory: Sl decreased vib feet.


           Gait: Sl wide-based, sl shuffle.





IMP: Mild B/L cerebral dysfunction.


       Probable Peripheral neuropathy.


       Acute cognitive decline due to Toxic-metabolic encephalopathy (Probable 

UTI, Thiamine deficiency)





Suggest: Agree with parenteral thiamine.


             Psyche follow-up.


             Review indications for Gabapentin Rx.


             Out patient Rx of UTI with oral antibiotics then repeat U/A as out 

patient.


             Neuro f/u as out patient.





Thank you very much,


Felix Cao MD

## 2019-01-25 NOTE — EKG
Test Reason : 

Blood Pressure : ***/*** mmHG

Vent. Rate : 106 BPM     Atrial Rate : 106 BPM

   P-R Int : 174 ms          QRS Dur : 098 ms

    QT Int : 354 ms       P-R-T Axes : 066 033 034 degrees

   QTc Int : 470 ms

 

*** POOR DATA QUALITY, INTERPRETATION MAY BE ADVERSELY AFFECTED

SINUS TACHYCARDIA

OTHERWISE NORMAL ECG

WHEN COMPARED WITH ECG OF 23-JAN-2019 07:34,

NO SIGNIFICANT CHANGE WAS FOUND

Confirmed by USMAN RAMIREZ, HIREN ([id number]) on 1/25/2019 11:56:46 AM

 

Referred By:             Confirmed By:HIREN MARY MD

## 2019-01-25 NOTE — PDOC
History of Present Illness





- General


Chief Complaint: Blood Sugar Problem


Stated Complaint: DIABETIC PROBLEM


History Source: Patient


Exam Limitations: No Limitations





- History of Present Illness


Initial Comments: 





63 yo M with a hx of HTN, DM, cirrhosis, and CKD presents to the emergency 

department [facility] for hypoglycemia and subsequent AMS. Per EMS, his FSBG was 

below the reader of their field unit, which means <20. The patient on 

presentation to the emergency department was a technically difficult interview 

given the patient was agitated and not wanting to answer questions. He was 

mumbling words that were difficult to understand. He states at the time of 

interview he had no pain and wished to leave back home. He denied the following

: fever, chills, chest pain, SOB, nausea, vomiting, head trauma, LOC, dysuria, 

hematuria, and diarrhea. 








Past History





- Past Medical History


Allergies/Adverse Reactions: 


 Allergies











Allergy/AdvReac Type Severity Reaction Status Date / Time


 


No Known Allergies Allergy   Verified 01/25/19 03:35











Home Medications: 


Ambulatory Orders





Aspirin [Aspirin EC] 81 mg PO DAILY 08/07/18 


Bisacodyl [Dulcolax] 5 mg PO TID PRN 08/07/18 


Folic Acid 1 mg PO DAILY 08/07/18 


Gabapentin [Neurontin] 400 mg PO BID 08/07/18 


Lisinopril 30 mg PO DAILY 08/07/18 


Mirtazapine [Remeron -] 30 mg PO DAILY 08/07/18 


Pantoprazole Sodium [Protonix] 40 mg PO DAILY 08/07/18 


Quetiapine Fumarate [Seroquel -] 25 mg PO HS 08/07/18 


Tamsulosin HCl [Flomax] 0.4 mg PO HS 08/07/18 


Atorvastatin Ca [Lipitor] 80 mg PO HS 01/23/19 


Ergocalciferol (Vitamin D2) [Vitamin D2] 50,000 unit PO WEEKLY 01/23/19 


Escitalopram Oxalate [Lexapro -] 10 mg PO DAILY 01/23/19 


Insulin Glargine,Hum.rec.anlog [Basaglar Kwikpen U-100] 100 unit SQ 1800 01/23/ 19 


Insulin Lispro [Humalog] 10 - 15 unit SQ ACHS PRN 01/23/19 


Nifedipine [Nifedipine ER] 90 mg PO DAILY 01/23/19 


Oxycodone HCl/Acetaminophen [Percocet 5-325 mg Tablet] 1 tab PO Q4H PRN 01/23/ 19 


Thiamine Mononitrate [Vitamin B-1] 100 mg PO DAILY 01/23/19 








CVA: Yes


COPD: No


DVT: No


Diabetes: Yes


Dialysis: Yes


HTN: Yes


Liver Disease: Yes


Psychiatric Problems: Yes


Seizures: Yes





- Immunization History


Immunization Up to Date: Yes





- Suicide/Smoking/Psychosocial Hx


Smoking Status: No


Smoking History: Never smoked


Have you smoked in the past 12 months: No


Number of Cigarettes Smoked Daily: 0


Cigars Per Day: 0


Information on smoking cessation initiated: No


Hx Alcohol Use: No


Drug/Substance Use Hx: No


Substance Use Type: None


Hx Substance Use Treatment: No





*Physical Exam





- Vital Signs


 Last Vital Signs











Temp Pulse Resp BP Pulse Ox


 


 98.0 F   116 H  18   171/118 H  99 


 


 01/25/19 03:35  01/25/19 03:35  01/25/19 03:35  01/25/19 03:35  01/25/19 03:35














Moderate Sedation





- Procedure Monitoring


Vital Signs: 


Procedure Monitoring Vital Signs











Temperature  98.0 F   01/25/19 03:35


 


Pulse Rate  116 H  01/25/19 03:35


 


Respiratory Rate  18   01/25/19 03:35


 


Blood Pressure  171/118 H  01/25/19 03:35


 


O2 Sat by Pulse Oximetry (%)  99   01/25/19 03:35











ED Treatment Course





- LABORATORY


CBC & Chemistry Diagram: 


 01/25/19 04:15





 01/25/19 04:15





Medical Decision Making





- Medical Decision Making





63 yo M with a hx of HTN, DM, cirrhosis, and CKD recently admitted for AMS who 

left AMA prior to EEG review presents to the emergency department with a 

hypoglycemic event and subsequent AMS. The patient recently AMA'd following an 

initial assessment by neurology 





Initial vitals:


 Initial Vital Signs











Temp Pulse Resp BP Pulse Ox


 


 98.0 F   116 H  18   171/118 H  99 


 


 01/25/19 03:35  01/25/19 03:35  01/25/19 03:35  01/25/19 03:35  01/25/19 03:35








ddx: AMS (metabolic (glucose (overdose or misuse of insulin) vs electrolyte 

disturbance) vs infectious etiology vs hepatic encephalopathy (given history of 

cirrhosis) vs acute intracranial process vs cardiac etiology (dysrhythmia vs 

major cardiac event) vs anemia





Orders: cbc, cmp, ammonia, troponins, ammonia, ETOH, urine tox, urine analysis, 

cxr, head ct, cervical spine CT, ekg, lactic acid





 Laboratory Tests











  01/25/19 01/25/19 01/25/19





  04:00 04:15 04:15


 


WBC   5.1 


 


RBC   4.15 


 


Hgb   13.2 


 


Hct   37.7 


 


MCV   90.8 


 


MCH   31.7 


 


MCHC   34.9 


 


RDW   12.9 


 


Plt Count   175 


 


MPV   7.9 


 


Absolute Neuts (auto)   2.6 


 


Neutrophils %   51.3 


 


Lymphocytes %   33.5  D 


 


Monocytes %   11.8 H 


 


Eosinophils %   2.8 


 


Basophils %   0.6 


 


Nucleated RBC %   0 


 


PT with INR    12.30


 


INR    1.04


 


PTT (Actin FS)    38.1 H


 


Sodium   


 


Potassium   


 


Chloride   


 


Carbon Dioxide   


 


Anion Gap   


 


BUN   


 


Creatinine   


 


Creat Clearance w eGFR   


 


POC Glucometer  [phone]  


 


Random Glucose   


 


Lactic Acid   


 


Calcium   


 


Total Bilirubin   


 


AST   


 


ALT   


 


Alkaline Phosphatase   


 


Ammonia   


 


Creatine Kinase   


 


Creatine Kinase Index   


 


CK-MB (CK-2)   


 


Troponin I   


 


Total Protein   


 


Albumin   


 


Urine Color   


 


Urine Appearance   


 


Urine pH   


 


Ur Specific Gravity   


 


Urine Protein   


 


Urine Glucose (UA)   


 


Urine Ketones   


 


Urine Blood   


 


Urine Nitrite   


 


Urine Bilirubin   


 


Urine Urobilinogen   


 


Ur Leukocyte Esterase   


 


Urine WBC (Auto)   


 


Urine RBC (Auto)   


 


Ur Epithelial Cells   


 


Urine Bacteria   


 


Opiates Screen   


 


Methadone Screen   


 


Barbiturate Screen   


 


Phencyclidine Screen   


 


Ur Amphetamines Screen   


 


MDMA (Ecstasy) Screen   


 


Benzodiazepines Screen   


 


Cocaine Screen   


 


U Marijuana (THC) Screen   


 


Alcohol, Quantitative   


 


Blood Type   


 


Antibody Screen   














  01/25/19 01/25/19 01/25/19





  04:15 04:15 04:30


 


WBC   


 


RBC   


 


Hgb   


 


Hct   


 


MCV   


 


MCH   


 


MCHC   


 


RDW   


 


Plt Count   


 


MPV   


 


Absolute Neuts (auto)   


 


Neutrophils %   


 


Lymphocytes %   


 


Monocytes %   


 


Eosinophils %   


 


Basophils %   


 


Nucleated RBC %   


 


PT with INR   


 


INR   


 


PTT (Actin FS)   


 


Sodium  135 L  


 


Potassium  4.1  


 


Chloride  102  


 


Carbon Dioxide  23  


 


Anion Gap  10  


 


BUN  22 H  


 


Creatinine  1.5 H  


 


Creat Clearance w eGFR  47.42  


 


POC Glucometer   


 


Random Glucose  296 H  


 


Lactic Acid   


 


Calcium  8.6  


 


Total Bilirubin  0.4  


 


AST  18  


 


ALT  27  


 


Alkaline Phosphatase  156 H  


 


Ammonia   


 


Creatine Kinase   


 


Creatine Kinase Index   


 


CK-MB (CK-2)   


 


Troponin I   


 


Total Protein  7.8  


 


Albumin  3.8  


 


Urine Color    Straw


 


Urine Appearance    Clear


 


Urine pH    6.0


 


Ur Specific Gravity    1.003 L


 


Urine Protein    Negative


 


Urine Glucose (UA)    3+ H


 


Urine Ketones    Negative


 


Urine Blood    1+ H


 


Urine Nitrite    Negative


 


Urine Bilirubin    Negative


 


Urine Urobilinogen    Negative


 


Ur Leukocyte Esterase    2+ H


 


Urine WBC (Auto)    20


 


Urine RBC (Auto)    1


 


Ur Epithelial Cells    Rare


 


Urine Bacteria    Few


 


Opiates Screen   


 


Methadone Screen   


 


Barbiturate Screen   


 


Phencyclidine Screen   


 


Ur Amphetamines Screen   


 


MDMA (Ecstasy) Screen   


 


Benzodiazepines Screen   


 


Cocaine Screen   


 


U Marijuana (THC) Screen   


 


Alcohol, Quantitative  < 3.0  


 


Blood Type   O POSITIVE 


 


Antibody Screen   Negative 














  01/25/19 01/25/19 01/25/19





  04:30 04:47 04:52


 


WBC   


 


RBC   


 


Hgb   


 


Hct   


 


MCV   


 


MCH   


 


MCHC   


 


RDW   


 


Plt Count   


 


MPV   


 


Absolute Neuts (auto)   


 


Neutrophils %   


 


Lymphocytes %   


 


Monocytes %   


 


Eosinophils %   


 


Basophils %   


 


Nucleated RBC %   


 


PT with INR   


 


INR   


 


PTT (Actin FS)   


 


Sodium   


 


Potassium   


 


Chloride   


 


Carbon Dioxide   


 


Anion Gap   


 


BUN   


 


Creatinine   


 


Creat Clearance w eGFR   


 


POC Glucometer   


 


Random Glucose   


 


Lactic Acid   


 


Calcium   


 


Total Bilirubin   


 


AST   


 


ALT   


 


Alkaline Phosphatase   


 


Ammonia    18.19


 


Creatine Kinase   


 


Creatine Kinase Index   


 


CK-MB (CK-2)   


 


Troponin I   


 


Total Protein   


 


Albumin   


 


Urine Color   


 


Urine Appearance   


 


Urine pH   


 


Ur Specific Gravity   


 


Urine Protein   


 


Urine Glucose (UA)   


 


Urine Ketones   


 


Urine Blood   


 


Urine Nitrite   


 


Urine Bilirubin   


 


Urine Urobilinogen   


 


Ur Leukocyte Esterase   


 


Urine WBC (Auto)   


 


Urine RBC (Auto)   


 


Ur Epithelial Cells   


 


Urine Bacteria   


 


Opiates Screen  Negative  


 


Methadone Screen  Negative  


 


Barbiturate Screen  Negative  


 


Phencyclidine Screen  Negative  


 


Ur Amphetamines Screen  Negative  


 


MDMA (Ecstasy) Screen  Negative  


 


Benzodiazepines Screen  Negative  


 


Cocaine Screen  Negative  


 


U Marijuana (THC) Screen  Negative  


 


Alcohol, Quantitative   < 3.0 


 


Blood Type   


 


Antibody Screen   














  01/25/19 01/25/19





  04:52 05:00


 


WBC  


 


RBC  


 


Hgb  


 


Hct  


 


MCV  


 


MCH  


 


MCHC  


 


RDW  


 


Plt Count  


 


MPV  


 


Absolute Neuts (auto)  


 


Neutrophils %  


 


Lymphocytes %  


 


Monocytes %  


 


Eosinophils %  


 


Basophils %  


 


Nucleated RBC %  


 


PT with INR  


 


INR  


 


PTT (Actin FS)  


 


Sodium  


 


Potassium  


 


Chloride  


 


Carbon Dioxide  


 


Anion Gap  


 


BUN  


 


Creatinine  


 


Creat Clearance w eGFR  


 


POC Glucometer  


 


Random Glucose  


 


Lactic Acid  1.5 


 


Calcium  


 


Total Bilirubin  


 


AST  


 


ALT  


 


Alkaline Phosphatase  


 


Ammonia  


 


Creatine Kinase   163


 


Creatine Kinase Index   2.5


 


CK-MB (CK-2)   4.1 H


 


Troponin I   < 0.02


 


Total Protein  


 


Albumin  


 


Urine Color  


 


Urine Appearance  


 


Urine pH  


 


Ur Specific Gravity  


 


Urine Protein  


 


Urine Glucose (UA)  


 


Urine Ketones  


 


Urine Blood  


 


Urine Nitrite  


 


Urine Bilirubin  


 


Urine Urobilinogen  


 


Ur Leukocyte Esterase  


 


Urine WBC (Auto)  


 


Urine RBC (Auto)  


 


Ur Epithelial Cells  


 


Urine Bacteria  


 


Opiates Screen  


 


Methadone Screen  


 


Barbiturate Screen  


 


Phencyclidine Screen  


 


Ur Amphetamines Screen  


 


MDMA (Ecstasy) Screen  


 


Benzodiazepines Screen  


 


Cocaine Screen  


 


U Marijuana (THC) Screen  


 


Alcohol, Quantitative  


 


Blood Type  


 


Antibody Screen  











Labs showed there was a positive leukocyte esterase with 20 WBC. Ammonia was 18

, trop negative, no elevation in WBC, no anemia, ETOH <3, and urine toxicology 

was negative. 





Restraints were ordered as well as a total of 2 mg of ativan and 2 mg of versed 

were ordered due to agitation and leaving while having perceived AMS. In 

addition, the patient was perceived to have AMS and the work up was pending. In 

addition, the patient was becoming agitated and tapped myself and others when 

trying to leave. 





01/25/19 07:32


On reassessment, the patient was A&O x3. upon reassessment, the patient 

produced logical statements and was conversant. We asked the patient if he 

would like to continue to the workup previously started the day prior. He 

stated he did not and wanted to be discharged.





The patient was amendable to doing a head CT prior to discharge to rule out 

intracranial acute processes. the patient stated he did not want to stay in the 

hospital. we advised him to follow up with Dr. Cao for continued 

neurological evaluation. I offered the patient antibiotics prior to his 

discharge for his UTI but he declined the antibiotics in the department and for 

outpatient use stating he "do not need them". 





The patient was able to ambulate on his own volition without difficulty. I 

spoke to his daughter, Juanita, who arranged an Uber  for him at the 

hospital. In addition, an appointment to be seen by Dr Vang was done by Juanita 

for Saturday, Jan 26th 2019 for follow up care and management. 











*DC/Admit/Observation/Transfer


Diagnosis at time of Disposition: 


 History of liver disease








- Discharge Dispostion


Disposition: HOME


Decision to Admit order: No





- Referrals


Referrals: 


Milena Vang MD [Primary Care Provider] - 





- Patient Instructions


Printed Discharge Instructions:  DI for Hypoglycemia


Additional Instructions: 


you were seen in the emergency department for the evaluation of your altered 

mental status. your imaging and labs were within normal limits and you have 

appropriate mentation with capacity. please follow up with dr vang within 1 

week. please arrange transportation with your wife to be picked up from the 

hospital. please return if you have worsening symptoms or new concerning 

symptoms such as fever with altered mental status, chest pain, and fall. thank 

you. 





- Post Discharge Activity

## 2019-01-26 NOTE — PDOC
Attending Attestation





- Resident


Resident Name: Vick Finn





- ED Attending Attestation


I have performed the following: I have examined & evaluated the patient, The 

case was reviewed & discussed with the resident, I agree w/resident's findings 

& plan, Exceptions are as noted





- HPI


HPI: 





01/26/19 08:44


62y  M hx of HTN, DMII, Cirhosis, etoh abuse, CKD, presents with slurred 

speech. Per EMS, the wife was concerned about the patients slurred speech thi 

[facility] and called EMS.  Upon arrival, the pt had noted slurring of speech and 

seemed agitated, but was able to answer directed questions - denies any focal 

complaints including headache, vision changes, n/v, f/c, cough, abd pain, 

dysuria, diarrhea, melena, bpr, lgitheadedness, diaphoresis.  This is hte pts 

3rd visit to the ED in the past week for the same complaint. Was intiially 

admitted to the hospital for the first visit, had a neuro consult, ct, mri, and 

his sypmtoms were thought to be possible infectious in nature (uti). pt came 

bcak the next day and left AMA. There seems to be a waxing waning component to 

the patients AMS - ?UTI?





GENERAL: The patient is awake, alert,, Nontoxic - in no acute distress.


HEAD: Normocephalic, atraumatic.


EYES: extraocular movements intact, sclera anicteric, abnormal cornea on R


ENT: Normal voice,  Moist mucous membranes.


NECK: Normal range of motion, supple


LUNGS: Breath sounds equal, clear to auscultation bilaterally.  No wheezes, no 

rhonchi, no rales.


HEART: tachycardic, normal S1 and S2 without murmur, rub or gallop.


ABDOMEN: Soft, nontender, normoactive bowel sounds.  No guarding, no rebound.  

. No CVA tenderness


EXTREMITIES: Normal range of motion, no edema.  No clubbing or cyanosis. No 

cords, erythema, or tenderness.


NEUROLOGICAL: No facial assymetry, Normal speech, moving all 4 extreimties 

spontanously and symmetrically, sensatio nintact thorughout, strenght 5/5 in 

upper/lower and symmetric


PSYCH: Normal mood, normal affect.


SKIN: Warm, Dry, normal turgor,








ddx - metabolic/infectious, unlikely cva with neg mri recently, 


will obtain blood workup, cx


fluids for hydration


pt was agitated, walking around, required ativan for sedation


will give abx for possible UTI


will reassess, anticipate admission











01/26/19 14:44


pts labs rviewed


UA consistent with UTI


sp abx


pt admitted for further management











- Physicial Exam


PE: 





01/27/19 10:06


see above





- Medical Decision Making





01/27/19 10:06


see above





**Heart Score/ECG Review





- ECG Impressions


Comment:: 





01/26/19 09:11


Twelve-lead EKG was performed and reviewed by me. 


There is normal sinus rhythm with a rate of 115


PVCs present


Normal axis





Impression sinus Tachycardia

## 2019-01-26 NOTE — HP
Admitting History and Physical





- Admission


Chief Complaint: AMS, slurred speech


History of Present Illness: 





Patient is a 61 y/o male with past medical history of HTN, DM type 2, Cirrhosis

, ETOH abuse, and CKD.  As per ER documentation presented to ER with AMS and 

slurred speech after wife called EMS.  When interview patient he states he was 

experiencing dizziness last night.  He stated dizziness began at 10pm while 

watching TV and continued to this morning and his family was concerned and said 

he should go to the hospital. Patient has been seen in [facility] ER three times this 

week with most recent visit where patient left AMA.  UA show 3+ leukocytes. 

Head CT scan performed in ER.  Patient denies dizziness, chest pain, or SOB.  


History Source: Patient


Limitations to Obtaining History: No Limitations





- Past Medical History


CNS: Yes: Seizure


Cardiovascular: Yes: HTN, Hyperlipdemia


Renal/: Yes: BPH


Psych: Yes: Addictions


Endocrine: Yes: Diabetes Mellitus





- Smoking History


Smoking history: Unknown if ever smoked


Have you smoked in the past 12 months: No


Aproximately how many cigarettes per day: 0





- Alcohol/Substance Use


Hx Alcohol Use: No


History of Substance Use: reports: None





- Social History


Usual Living Arrangement: Yes: With Significant Other


ADL: Independent


History of Recent Travel: No





<Noemi Anderson - Last Filed: 01/26/19 19:26>





Home Medications





<Noemi Anderson - Last Filed: 01/26/19 19:26>





<Matteo Wallace - Last Filed: 01/27/19 09:30>





- Allergies


Allergies/Adverse Reactions: 


 Allergies











Allergy/AdvReac Type Severity Reaction Status Date / Time


 


No Known Allergies Allergy   Verified 01/26/19 07:44














- Home Medications


Home Medications: 


Ambulatory Orders





Aspirin [Aspirin EC] 81 mg PO DAILY 08/07/18 


Bisacodyl [Dulcolax] 5 mg PO TID PRN 08/07/18 


Folic Acid 1 mg PO DAILY 08/07/18 


Gabapentin [Neurontin] 400 mg PO BID 08/07/18 


Lisinopril 30 mg PO DAILY 08/07/18 


Mirtazapine [Remeron -] 30 mg PO DAILY 08/07/18 


Pantoprazole Sodium [Protonix] 40 mg PO DAILY 08/07/18 


Quetiapine Fumarate [Seroquel -] 25 mg PO HS 08/07/18 


Tamsulosin HCl [Flomax] 0.4 mg PO HS 08/07/18 


Atorvastatin Ca [Lipitor] 80 mg PO HS 01/23/19 


Ergocalciferol (Vitamin D2) [Vitamin D2] 50,000 unit PO WEEKLY 01/23/19 


Escitalopram Oxalate [Lexapro -] 10 mg PO DAILY 01/23/19 


Insulin Glargine,Hum.rec.anlog [Basaglar Kwikpen U-100] 100 unit SQ 1800 01/23/ 19 


Insulin Lispro [Humalog] 10 - 15 unit SQ ACHS PRN 01/23/19 


Nifedipine [Nifedipine ER] 90 mg PO DAILY 01/23/19 


Oxycodone HCl/Acetaminophen [Percocet 5-325 mg Tablet] 1 tab PO Q4H PRN 01/23/ 19 


Thiamine Mononitrate [Vitamin B-1] 100 mg PO DAILY 01/23/19 











Review of Systems





- Review of Systems


Constitutional: reports: No Symptoms


Eyes: reports: No Symptoms


HENT: reports: No Symptoms


Neck: reports: No Symptoms


Cardiovascular: reports: No Symptoms


Respiratory: reports: No Symptoms


Gastrointestinal: reports: Constipation


Genitourinary: reports: Frequency


Musculoskeletal: reports: No Symptoms


Integumentary: reports: No Symptoms


Neurological: reports: No Symptoms


Endocrine: reports: No Symptoms


Hematology/Lymphatic: reports: No Symptoms


Psychiatric: reports: No Symptoms





<Noemi Anderson - Last Filed: 01/26/19 19:26>





Physical Examination


Vital Signs: 


 Vital Signs











Temperature  97 F L  01/26/19 07:41


 


Pulse Rate  101 H  01/26/19 19:00


 


Respiratory Rate  18   01/26/19 19:00


 


Blood Pressure  101/64   01/26/19 19:00


 


O2 Sat by Pulse Oximetry (%)  98   01/26/19 19:00











Constitutional: Yes: No Distress, Calm


Eyes: Yes: Conjunctiva Clear


HENT: Yes: Atraumatic


Neck: Yes: Supple


Cardiovascular: Yes: Regular Rate and Rhythm


Respiratory: Yes: Regular, CTA Bilaterally


Gastrointestinal: Yes: Normal Bowel Sounds, Soft


Musculoskeletal: Yes: WNL


Extremities: Yes: WNL


Edema: No


Neurological: Yes: Alert, Oriented


Psychiatric: Yes: Alert, Oriented


Labs: 


 CBC, BMP





 01/26/19 08:05 





 01/26/19 07:48 











<Noemi Anderson - Last Filed: 01/26/19 19:26>


Vital Signs: 


 Vital Signs











Temperature  98.2 F   01/27/19 06:22


 


Pulse Rate  94 H  01/27/19 06:22


 


Respiratory Rate  20   01/27/19 05:00


 


Blood Pressure  133/84   01/27/19 06:22


 


O2 Sat by Pulse Oximetry (%)  97   01/27/19 05:00











Labs: 


 CBC, BMP





 01/27/19 06:00 





 01/27/19 06:00 











<Matteo Wallace - Last Filed: 01/27/19 09:30>





Problem List





- Problems


(1) Change in mental status


Assessment/Plan: 





-pending head ct scan result


-neuro checks


Code(s): R41.82 - ALTERED MENTAL STATUS, UNSPECIFIED   


Qualifiers: 


   Altered mental status type: disorientation   Qualified Code(s): R41.0 - 

Disorientation, unspecified   





(2) UTI (urinary tract infection)


Assessment/Plan: 





-pending UC


-ID consult for recommendation on ABT


-NS at 50cc/hr for hydration


-monitor I&O


Code(s): N39.0 - URINARY TRACT INFECTION, SITE NOT SPECIFIED   


Qualifiers: 


   Urinary tract infection type: site unspecified   Hematuria presence: without 

hematuria   Qualified Code(s): N39.0 - Urinary tract infection, site not 

specified   





(3) EMILIE (acute kidney injury)


Assessment/Plan: 





-will monitor and trend BUN/Cr


-nephrology consult


Code(s): N17.9 - ACUTE KIDNEY FAILURE, UNSPECIFIED   





(4) Constipation


Assessment/Plan: 





-start on senna 2 tab po qhs





Code(s): K59.00 - CONSTIPATION, UNSPECIFIED   


Qualifiers: 


   Constipation type: other constipation type   Qualified Code(s): K59.09 - 

Other constipation   





(5) Diabetes


Assessment/Plan: 





-HgA1c ordered


-BGM ACHS, ISS


-diabetic diet


Code(s): E11.9 - TYPE 2 DIABETES MELLITUS WITHOUT COMPLICATIONS   


Qualifiers: 


   Diabetes mellitus type: type 2 





(6) History of liver disease


Assessment/Plan: 





-Alk phos elev 155, AST/ALT nl, will trend


-GI consult


Code(s): Z87.19 - PERSONAL HISTORY OF OTHER DISEASES OF THE DIGESTIVE SYSTEM   





(7) Hypertension


Assessment/Plan: 





-continue nifedipine and lisinopril


-hold BP meds if SBP <100 and/or DBP <60


-low Na diet


Code(s): I10 - ESSENTIAL (PRIMARY) HYPERTENSION   





<Noemi Anderson - Last Filed: 01/26/19 19:26>





Assessment/Plan





PATIENT SEEN AND EXAMINED AND I AGREE WITH THE ABOVE NOTE





<Matteo Wallace - Last Filed: 01/27/19 09:30>

## 2019-01-26 NOTE — PDOC
History of Present Illness





- General


Stated Complaint: SLURRED SPEECH


Time Seen by Provider: 01/26/19 07:36





- History of Present Illness


Initial Comments: 





01/26/19 07:44


63 yo M with h/o HTN, DMII, cirrhosis, CKD, who p/w confusion. Patient BIBEMS, 

following call by family members that patient was confused with slurred speech, 

and wondering in circles around room. Patient denies complaints. Recently 

admitted to [facility] for AMS/confusion, similiar presentation 01/24/19. Patient 

signed out AMA. Lab eval at that time unremarkable. UA notable for 2+ LE, 20 

WBC. MRI brain moderate diffuse atrophy. Evaluated by Dr. Cao Neurology who 

believed patient with acute cognitive decline 2/2 toxic-metabolic 

encephalopathy (UTI, Vs. Thiamine deficiency). 





Patient denies HA, vision change, N/V, F,C, CP, SOB, urinary complaints, 

abdominal pain, diarrhea, constipation, lightheadedness, weakness, sensory 

changes. 





PMHx: as noted above


ROS: as noted


SHx: Denies Etoh, IVDA, tobacco use. 


Allergies: NKDA








Past History





- Past Medical History


Allergies/Adverse Reactions: 


 Allergies











Allergy/AdvReac Type Severity Reaction Status Date / Time


 


No Known Allergies Allergy   Verified 01/26/19 07:44











Home Medications: 


Ambulatory Orders





Aspirin [Aspirin EC] 81 mg PO DAILY 08/07/18 


Bisacodyl [Dulcolax] 5 mg PO TID PRN 08/07/18 


Folic Acid 1 mg PO DAILY 08/07/18 


Gabapentin [Neurontin] 400 mg PO BID 08/07/18 


Lisinopril 30 mg PO DAILY 08/07/18 


Mirtazapine [Remeron -] 30 mg PO DAILY 08/07/18 


Pantoprazole Sodium [Protonix] 40 mg PO DAILY 08/07/18 


Quetiapine Fumarate [Seroquel -] 25 mg PO HS 08/07/18 


Tamsulosin HCl [Flomax] 0.4 mg PO HS 08/07/18 


Atorvastatin Ca [Lipitor] 80 mg PO HS 01/23/19 


Ergocalciferol (Vitamin D2) [Vitamin D2] 50,000 unit PO WEEKLY 01/23/19 


Escitalopram Oxalate [Lexapro -] 10 mg PO DAILY 01/23/19 


Insulin Glargine,Hum.rec.anlog [Basaglar Kwikpen U-100] 100 unit SQ 1800 01/23/ 19 


Insulin Lispro [Humalog] 10 - 15 unit SQ ACHS PRN 01/23/19 


Nifedipine [Nifedipine ER] 90 mg PO DAILY 01/23/19 


Oxycodone HCl/Acetaminophen [Percocet 5-325 mg Tablet] 1 tab PO Q4H PRN 01/23/ 19 


Thiamine Mononitrate [Vitamin B-1] 100 mg PO DAILY 01/23/19 








CVA: Yes


COPD: No


DVT: No


Diabetes: Yes


Dialysis: Yes


HTN: Yes


Liver Disease: Yes


Psychiatric Problems: Yes


Seizures: Yes





- Immunization History


Immunization Up to Date: Yes





- Suicide/Smoking/Psychosocial Hx


Smoking Status: No


Smoking History: Unknown if ever smoked


Have you smoked in the past 12 months: No


Number of Cigarettes Smoked Daily: 0


Cigars Per Day: 0


Information on smoking cessation initiated: No


Hx Alcohol Use: No


Drug/Substance Use Hx: No


Substance Use Type: None


Hx Substance Use Treatment: No





**Review of Systems





- Review of Systems


Comments:: 





01/26/19 07:54





GENERAL/CONSTITUTIONAL: No fever or chills. No weakness.


HEAD, EYES, EARS, NOSE AND THROAT: No change in vision. No ear pain or 

discharge. No sore throat.


CARDIOVASCULAR: No chest pain or shortness of breath


RESPIRATORY: No cough, wheezing, or hemoptysis.


GASTROINTESTINAL: No nausea, vomiting, diarrhea or constipation.


GENITOURINARY: No dysuria, frequency, or change in urination.


MUSCULOSKELETAL: No joint or muscle swelling or pain. No neck or back pain.


SKIN: No rash


NEUROLOGIC: No headache, vertigo, loss of consciousness, or change in strength/

sensation.


ENDOCRINE: No increased thirst. No abnormal weight change


HEMATOLOGIC/LYMPHATIC: No anemia, easy bleeding, or history of blood clots.


ALLERGIC/IMMUNOLOGIC: No hives or skin allergy.








*Physical Exam





- Vital Signs


 Last Vital Signs











Temp Pulse Resp BP Pulse Ox


 


 97 F L  124 H  20   137/94   98 


 


 01/26/19 07:41  01/26/19 07:41  01/26/19 07:41  01/26/19 07:41  01/26/19 07:41














- Physical Exam


Comments: 





01/26/19 07:53


GENERAL: Awake, alert, and oriented x 2, in no acute distress


HEAD: No signs of trauma, normocephalic, atraumatic 


EYES: PERRLA, EOMI, sclera anicteric, conjunctiva clear


ENT: Auricles normal inspection, hearing grossly normal, nares patent, 

oropharynx clear without


exudates. Moist mucosa


NECK: Normal ROM, supple, no lymphadenopathy, JVD, or masses


LUNGS: No distress, speaks full sentences, clear to auscultation bilaterally 


HEART: Regular rate and rhythm, normal S1 and S2, no murmurs, rubs or gallops, 

peripheral pulses normal and equal bilaterally. 


ABDOMEN: Soft, nontender, normoactive bowel sounds.  No guarding, no rebound.  

No masses


EXTREMITIES : Normal inspection, Normal range of motion, no edema.  No clubbing 

or cyanosis. 


NEUROLOGICAL: Cranial nerves II through XII grossly intact.  Normal speech, 

normal gait, no focal sensorimotor deficits 


SKIN: Warm, Dry, normal turgor, no rashes or lesions noted








Moderate Sedation





- Procedure Monitoring


Vital Signs: 


Procedure Monitoring Vital Signs











Temperature  97 F L  01/26/19 07:41


 


Pulse Rate  124 H  01/26/19 07:41


 


Respiratory Rate  20   01/26/19 07:41


 


Blood Pressure  137/94   01/26/19 07:41


 


O2 Sat by Pulse Oximetry (%)  98   01/26/19 07:41











ED Treatment Course





- LABORATORY


CBC & Chemistry Diagram: 


 01/26/19 08:05





 01/26/19 07:48





Medical Decision Making





- Medical Decision Making





01/26/19 07:52


63 yo M with h/o HTN, DMII, cirrhosis, CKD, who p/w confusion. , vitals 

wnl, AF, A&Ox3. Physical exam unremarkable. Will assess for hypoglycemia, VBI/

TIA, electrolyte abnml, metabolic and toxic derangements, acid-base disturbances

, infection. 








ED Course: 





01/26/19 07:52


GLU~83


01/26/19 08:55





LA: 3.1 


01/26/19 09:23


CBC: Unremarkable


NH4: Neg 


VBG: Unremarkable





01/26/19 10:42


BUN/Cr: 24/1.4 ( Baseline ~1.5) 


01/26/19 13:55


Rocephin 1 gm





01/26/19 13:59


PMD joe Abbott, admits to [facility]


Answering service [facility] contacted. Awaiting call back





01/26/19 14:12


Patient endorsed to Ar[facility]ILLMAN. Request [facility]. 


Admit to medicine








*DC/Admit/Observation/Transfer


Diagnosis at time of Disposition: 


Altered mental state


Qualifiers:


 Altered mental status type: disorientation Qualified Code(s): R41.0 - 

Disorientation, unspecified





UTI (urinary tract infection)


Qualifiers:


 Urinary tract infection type: site unspecified Hematuria presence: without 

hematuria Qualified Code(s): N39.0 - Urinary tract infection, site not specified








- Discharge Dispostion


Decision to Admit order: Yes





- Referrals


Referrals: 


Milena Vang MD [Staff Physician] - 





- Patient Instructions





- Post Discharge Activity

## 2019-01-26 NOTE — EKG
Test Reason : 

Blood Pressure : ***/*** mmHG

Vent. Rate : 115 BPM     Atrial Rate : 115 BPM

   P-R Int : 168 ms          QRS Dur : 090 ms

    QT Int : 346 ms       P-R-T Axes : 050 011 044 degrees

   QTc Int : 478 ms

 

SINUS TACHYCARDIA WITH FREQUENT PREMATURE VENTRICULAR COMPLEXES

WHEN COMPARED WITH ECG OF 25-JAN-2019 07:15,

PREMATURE VENTRICULAR COMPLEXES ARE NOW PRESENT

Confirmed by XANDER TRACY MD ([id number]) on 1/26/2019 12:50:29 PM

 

Referred By:             Confirmed By:XANDER TRACY MD

## 2019-01-27 NOTE — PN
Progress Note (short form)





- Note


Progress Note: 





GI NOte: Informed by Noemi Anderson NP that the patient is leaving AMA and that 

the GI consult request is withdrawn.

## 2019-01-27 NOTE — HOSP
Physical Examination


Vital Signs: 


 Vital Signs











Temperature  97 F L  01/26/19 07:41


 


Pulse Rate  101 H  01/26/19 19:00


 


Respiratory Rate  18   01/26/19 19:00


 


Blood Pressure  101/64   01/26/19 19:00


 


O2 Sat by Pulse Oximetry (%)  98   01/26/19 19:00











Labs: 


 CBC, BMP





 01/26/19 08:05 





 01/26/19 07:48 











Hospitalist Encounter


Assessment: 





I spoke to the patient - he is not suicidal, genocidal or wants to hurt himself 

or leave. 


will d/c the 1:1 for the patient

## 2019-01-27 NOTE — DS
Physical Examination


Vital Signs: 


 Vital Signs











Temperature  98.2 F   01/27/19 06:22


 


Pulse Rate  94 H  01/27/19 06:22


 


Respiratory Rate  20   01/27/19 05:00


 


Blood Pressure  133/84   01/27/19 06:22


 


O2 Sat by Pulse Oximetry (%)  97   01/27/19 05:00











Constitutional: Yes: No Distress, Calm


Eyes: Yes: Conjunctiva Clear


HENT: Yes: Atraumatic


Neck: Yes: Supple


Cardiovascular: Yes: Regular Rate and Rhythm


Respiratory: Yes: Regular, CTA Bilaterally


Gastrointestinal: Yes: Normal Bowel Sounds, Soft


Musculoskeletal: Yes: WNL


Extremities: Yes: WNL


Edema: No


Neurological: Yes: Alert, Oriented


Psychiatric: Yes: Alert, Oriented


Labs: 


 CBC, BMP





 01/27/19 06:00 





 01/27/19 06:00 











Discharge Summary


Reason For Visit: UTI,AMS


Current Active Problems





Change in mental status (Acute)


UTI (urinary tract infection) (Acute)








Hospital Course: 





 Laboratory Tests











  01/26/19 01/26/19 01/26/19





  07:48 07:48 08:05


 


WBC    6.1


 


RBC    3.96 L


 


Hgb    12.7


 


Hct    35.7


 


MCV    90.2


 


MCH    32.0


 


MCHC    35.5


 


RDW    12.8


 


Plt Count    180


 


MPV    7.7


 


Absolute Neuts (auto)    3.2


 


Neutrophils %    53.2


 


Lymphocytes %    31.0


 


Monocytes %    12.4 H


 


Eosinophils %    3.2


 


Basophils %    0.2


 


Nucleated RBC %    0


 


VBG pH   


 


POC VBG pCO2   


 


POC VBG pO2   


 


Mixed VBG HCO3   


 


Sodium  140  


 


Potassium  3.6  


 


Chloride  104  


 


Carbon Dioxide  28  


 


Anion Gap  8  


 


BUN  24 H  


 


Creatinine  1.4 H  


 


Creat Clearance w eGFR  51.35  


 


POC Glucometer   


 


Random Glucose  130 H  


 


Hemoglobin A1c %   


 


Lactic Acid   


 


Calcium  8.9  


 


Total Bilirubin  0.3  


 


AST  18  


 


ALT  28  


 


Alkaline Phosphatase  155 H  


 


Ammonia   


 


Creatine Kinase   216 


 


Creatine Kinase Index   2.2 


 


CK-MB (CK-2)   4.8 H 


 


Troponin I   < 0.02 


 


Total Protein  7.6  


 


Albumin  3.8  


 


Triglycerides   


 


Cholesterol   


 


Total LDL Cholesterol   


 


HDL Cholesterol   


 


Urine Color   


 


Urine Appearance   


 


Urine pH   


 


Ur Specific Gravity   


 


Urine Protein   


 


Urine Glucose (UA)   


 


Urine Ketones   


 


Urine Blood   


 


Urine Nitrite   


 


Urine Bilirubin   


 


Urine Urobilinogen   


 


Ur Leukocyte Esterase   


 


Urine WBC (Auto)   


 


Urine RBC (Auto)   


 


Ur Epithelial Cells   














  01/26/19 01/26/19 01/26/19





  08:35 08:45 08:50


 


WBC   


 


RBC   


 


Hgb   


 


Hct   


 


MCV   


 


MCH   


 


MCHC   


 


RDW   


 


Plt Count   


 


MPV   


 


Absolute Neuts (auto)   


 


Neutrophils %   


 


Lymphocytes %   


 


Monocytes %   


 


Eosinophils %   


 


Basophils %   


 


Nucleated RBC %   


 


VBG pH    7.34


 


POC VBG pCO2    51.0


 


POC VBG pO2    22.8 L


 


Mixed VBG HCO3    27.0 H


 


Sodium   


 


Potassium   


 


Chloride   


 


Carbon Dioxide   


 


Anion Gap   


 


BUN   


 


Creatinine   


 


Creat Clearance w eGFR   


 


POC Glucometer   


 


Random Glucose   


 


Hemoglobin A1c %   


 


Lactic Acid  1.1  


 


Calcium   


 


Total Bilirubin   


 


AST   


 


ALT   


 


Alkaline Phosphatase   


 


Ammonia   < 10.00 L 


 


Creatine Kinase   


 


Creatine Kinase Index   


 


CK-MB (CK-2)   


 


Troponin I   


 


Total Protein   


 


Albumin   


 


Triglycerides   


 


Cholesterol   


 


Total LDL Cholesterol   


 


HDL Cholesterol   


 


Urine Color   


 


Urine Appearance   


 


Urine pH   


 


Ur Specific Gravity   


 


Urine Protein   


 


Urine Glucose (UA)   


 


Urine Ketones   


 


Urine Blood   


 


Urine Nitrite   


 


Urine Bilirubin   


 


Urine Urobilinogen   


 


Ur Leukocyte Esterase   


 


Urine WBC (Auto)   


 


Urine RBC (Auto)   


 


Ur Epithelial Cells   














  01/26/19 01/26/19 01/27/19





  13:30 22:44 05:42


 


WBC   


 


RBC   


 


Hgb   


 


Hct   


 


MCV   


 


MCH   


 


MCHC   


 


RDW   


 


Plt Count   


 


MPV   


 


Absolute Neuts (auto)   


 


Neutrophils %   


 


Lymphocytes %   


 


Monocytes %   


 


Eosinophils %   


 


Basophils %   


 


Nucleated RBC %   


 


VBG pH   


 


POC VBG pCO2   


 


POC VBG pO2   


 


Mixed VBG HCO3   


 


Sodium   


 


Potassium   


 


Chloride   


 


Carbon Dioxide   


 


Anion Gap   


 


BUN   


 


Creatinine   


 


Creat Clearance w eGFR   


 


POC Glucometer   [phone]  211


 


Random Glucose   


 


Hemoglobin A1c %   


 


Lactic Acid   


 


Calcium   


 


Total Bilirubin   


 


AST   


 


ALT   


 


Alkaline Phosphatase   


 


Ammonia   


 


Creatine Kinase   


 


Creatine Kinase Index   


 


CK-MB (CK-2)   


 


Troponin I   


 


Total Protein   


 


Albumin   


 


Triglycerides   


 


Cholesterol   


 


Total LDL Cholesterol   


 


HDL Cholesterol   


 


Urine Color  Yellow  


 


Urine Appearance  Turbid  


 


Urine pH  6.0  


 


Ur Specific [place]  1.011  


 


Urine Protein  Negative  


 


Urine Glucose (UA)  Negative  


 


Urine Ketones  Negative  


 


Urine Blood  1+ H  


 


Urine Nitrite  Negative  


 


Urine Bilirubin  Negative  


 


Urine Urobilinogen  Negative  


 


Ur Leukocyte Esterase  3+ H  


 


Urine WBC (Auto)  1840  


 


Urine RBC (Auto)  9  


 


Ur Epithelial Cells  Rare  














  01/27/19 01/27/19 01/27/19





  06:00 06:00 06:00


 


WBC  6.1  


 


RBC  3.95 L  


 


Hgb  12.9  


 


Hct  36.2  


 


MCV  91.7  


 


MCH  32.7  


 


MCHC  35.6  


 


RDW  12.6  


 


Plt Count  176  


 


MPV  9.0  D  


 


Absolute Neuts (auto)   


 


Neutrophils %   


 


Lymphocytes %   


 


Monocytes %   


 


Eosinophils %   


 


Basophils %   


 


Nucleated RBC %   


 


VBG pH   


 


POC VBG pCO2   


 


POC VBG pO2   


 


Mixed VBG HCO3   


 


Sodium   137 


 


Potassium   4.4 


 


Chloride   105 


 


Carbon Dioxide   24 


 


Anion Gap   9 


 


BUN   22 H 


 


Creatinine   1.4 H 


 


Creat Clearance w eGFR   51.35 


 


POC Glucometer   


 


Random Glucose   204 H 


 


Hemoglobin A1c %    9.5 H


 


Lactic Acid   


 


Calcium   8.2 L 


 


Total Bilirubin   0.4 


 


AST   14 L 


 


ALT   25 


 


Alkaline Phosphatase   142 H 


 


Ammonia   


 


Creatine Kinase   


 


Creatine Kinase Index   


 


CK-MB (CK-2)   


 


Troponin I   


 


Total Protein   7.0 


 


Albumin   3.3 L 


 


Triglycerides   200 H 


 


Cholesterol   179 


 


Total LDL Cholesterol   115 H 


 


HDL Cholesterol   34 L 


 


Urine Color   


 


Urine Appearance   


 


Urine pH   


 


Ur Specific Gravity   


 


Urine Protein   


 


Urine Glucose (UA)   


 


Urine Ketones   


 


Urine Blood   


 


Urine Nitrite   


 


Urine Bilirubin   


 


Urine Urobilinogen   


 


Ur Leukocyte Esterase   


 


Urine WBC (Auto)   


 


Urine RBC (Auto)   


 


Ur Epithelial Cells   








Active Medications











Generic Name Dose Route Start Last Admin





  Trade Name Freq  PRN Reason Stop Dose Admin


 


Aspirin  81 mg  01/27/19 10:00  





  Ecotrin -  PO   





  DAILY [facility]   





     





     





     





     


 


Atorvastatin Calcium  80 mg  01/26/19 22:00  01/26/19 23:19





  Lipitor -  PO   80 mg





  [facility]   Administration





     





     





     





     


 


Escitalopram Oxalate  10 mg  01/27/19 10:00  





  Lexapro -  PO   





  DAILY [facility]   





     





     





     





     


 


Folic Acid  1 mg  01/27/19 10:00  





  Folic Acid -  PO   





  DAILY [facility]   





     





     





     





     


 


Gabapentin  400 mg  01/26/19 22:00  01/26/19 23:19





  Neurontin -  PO   400 mg





  BID [facility]   Administration





     





     





     





     


 


Sodium Chloride  1,000 mls @ 50 mls/hr  01/26/19 20:15  01/26/19 21:07





  Normal Saline -  IV  01/27/19 20:07  Not Given





  [facility]   





     





     





     





     


 


Ceftriaxone Sodium 2 gm/  100 mls @ 200 mls/hr  01/27/19 10:00  





  Dextrose  IVPB   





  DAILY [facility]   





     





     





  Protocol   





     


 


Insulin Aspart  1 vial  01/26/19 22:00  01/27/19 06:22





  Novolog Vial Sliding Scale -  SQ   2 unit





  [facility]   Administration





     





     





  Protocol   





     


 


Lisinopril  20 mg  01/27/19 05:30  01/27/19 05:38





  Prinivil  PO   20 mg





  DAILY [facility]   Administration





     





     





     





     


 


Nifedipine  90 mg  01/27/19 05:30  01/27/19 05:38





  Procardia Xl -  PO   90 mg





  DAILY [facility]   Administration





     





     





     





     


 


Pantoprazole Sodium  40 mg  01/27/19 10:00  





  Protonix -  PO   





  DAILY [facility]   





     





     





     





     


 


Quetiapine Fumarate  200 mg  01/27/19 22:00  





  Seroquel -  PO   





  [facility]   





     





     





     





     


 


Senna  2 tab  01/26/19 22:00  01/26/19 23:19





  Senna -  PO   2 tab





  [facility]   Administration





     





     





     





     


 


Tamsulosin HCl  0.4 mg  01/27/19 08:30  





  Flomax -  PO   





  DAILY@0830 [facility]   





     





     





     





     


 


Thiamine HCl  100 mg  01/27/19 10:00  





  Vitamin B1 -  PO   





  DAILY [facility]   





     





     





     





     











Condition: Unchanged/Unknown





- Instructions


Diet, Activity, Other Instructions: 


Patient is a 61 y/o male admitted from ER for UTI.  Patient is refusing 

treatment and wishes to leave.  Explained patient the risk and consequences of 

signing out AMA including pyleonephritis, sepsis, and death if not treated.  

Patient continue to request to leave   Patient instructed to follow up with PMD 

tomorrow morning or ASAP.  


Referrals: 


Milena Vang MD [Staff Physician] - 


Disposition: AGAINST MEDICAL ADVICE





- Home Medications


Comprehensive Discharge Medication List: 


Ambulatory Orders





Aspirin [Aspirin EC] 81 mg PO DAILY 08/07/18 


Bisacodyl [Dulcolax] 5 mg PO TID PRN 08/07/18 


Folic Acid 1 mg PO DAILY 08/07/18 


Gabapentin [Neurontin] 400 mg PO BID 08/07/18 


Lisinopril 30 mg PO DAILY 08/07/18 


Mirtazapine [Remeron -] 30 mg PO DAILY 08/07/18 


Pantoprazole Sodium [Protonix] 40 mg PO DAILY 08/07/18 


Quetiapine Fumarate [Seroquel -] 25 mg PO HS 08/07/18 


Tamsulosin HCl [Flomax] 0.4 mg PO HS 08/07/18 


Atorvastatin Ca [Lipitor] 80 mg PO HS 01/23/19 


Ergocalciferol (Vitamin D2) [Vitamin D2] 50,000 unit PO WEEKLY 01/23/19 


Escitalopram Oxalate [Lexapro -] 10 mg PO DAILY 01/23/19 


Insulin Glargine,Hum.rec.anlog [Basaglar Kwikpen U-100] 100 unit SQ 1800 01/23/ 19 


Insulin Lispro [Humalog] 10 - 15 unit SQ ACHS PRN 01/23/19 


Nifedipine [Nifedipine ER] 90 mg PO DAILY 01/23/19 


Oxycodone HCl/Acetaminophen [Percocet 5-325 mg Tablet] 1 tab PO Q4H PRN 01/23/ 19 


Thiamine Mononitrate [Vitamin B-1] 100 mg PO DAILY 01/23/19

## 2019-01-28 NOTE — CONSULT
Consult


Consult Specialty:: endocrine 


Referred by:: dr.ammir faarh


Reason for Consultation:: dm2 uncontrolled





- History of Present Illness


Chief Complaint: high sugars pain in both feet


History of Present Illness: 





 61 y/o male with past medical history of DM 2, HTN, Cirrhosis, ETOH abuse, and 

CKD.  As per ER documentation presented to ER with AMS and slurred speech after 

wife called EMS. HE has recent episode vertigo.  He stated dizziness began at 

10pm while watching TV . and his family was concerned and said he should go to 

the hospital. Patient has had multiple episodes with similiar symptoms this 

last week.feeling unable to manage diabetes or his overall health. he denies 

nausea vomiting or fever.





- Past Medical History


CNS: Yes: Seizure


Cardio/Vascular: Yes: HTN, Hyperlipdemia


Renal/: Yes: BPH


Psych: Yes: Addictions


Endocrine: Yes: Diabetes Mellitus


Additional Medical History: falls -> neck pain





- Alcohol/Substance Use


Hx Alcohol Use: No


History of Substance Use: reports: None





- Smoking History


Smoking history: Unknown if ever smoked


Have you smoked in the past 12 months: No


Aproximately how many cigarettes per day: 0





- Social History


Usual Living Arrangement: With Spouse


ADL: Independent


History of Recent Travel: No





Home Medications





- Allergies


Allergies/Adverse Reactions: 


 Allergies











Allergy/AdvReac Type Severity Reaction Status Date / Time


 


No Known Allergies Allergy   Verified 01/26/19 07:44














- Home Medications


Home Medications: 


Ambulatory Orders





Aspirin [Aspirin EC] 81 mg PO DAILY 08/07/18 


Bisacodyl [Dulcolax] 5 mg PO TID PRN 08/07/18 


Folic Acid 1 mg PO DAILY 08/07/18 


Gabapentin [Neurontin] 400 mg PO BID 08/07/18 


Lisinopril 30 mg PO DAILY 08/07/18 


Mirtazapine [Remeron -] 30 mg PO DAILY 08/07/18 


Pantoprazole Sodium [Protonix] 40 mg PO DAILY 08/07/18 


Quetiapine Fumarate [Seroquel -] 25 mg PO HS 08/07/18 


Tamsulosin HCl [Flomax] 0.4 mg PO HS 08/07/18 


Atorvastatin Ca [Lipitor] 80 mg PO HS 01/23/19 


Ergocalciferol (Vitamin D2) [Vitamin D2] 50,000 unit PO WEEKLY 01/23/19 


Escitalopram Oxalate [Lexapro -] 10 mg PO DAILY 01/23/19 


Insulin Glargine,Hum.rec.anlog [Basaglar Kwikpen U-100] 100 unit SQ 1800 01/23/ 19 


Insulin Lispro [Humalog] 10 - 15 unit SQ ACHS PRN 01/23/19 


Nifedipine [Nifedipine ER] 90 mg PO DAILY 01/23/19 


Oxycodone HCl/Acetaminophen [Percocet 5-325 mg Tablet] 1 tab PO Q4H PRN 01/23/ 19 


Thiamine Mononitrate [Vitamin B-1] 100 mg PO DAILY 01/23/19 











Review of Systems





- Review of Systems


Constitutional: reports: Lethargy, Weakness


HENT: reports: No Symptoms


Neck: reports: No Symptoms


Cardiovascular: reports: No Symptoms


Respiratory: reports: No Symptoms


Gastrointestinal: reports: Nausea


Genitourinary: reports: Frequency


Breasts: reports: No Symptoms Reported


Musculoskeletal: reports: Muscle Weakness


Neurological: reports: Numbness, Weakness


Endocrine: reports: No Symptoms, Unexplained Weight Loss


Psychiatric: reports: Altered Sleep Pattern, Anxiety





Physical Exam


Vital Signs: 


 Vital Signs











Temperature  97.2 F L  01/28/19 19:00


 


Pulse Rate  95 H  01/28/19 19:00


 


Respiratory Rate  16   01/28/19 19:00


 


Blood Pressure  134/76   01/28/19 19:00


 


O2 Sat by Pulse Oximetry (%)  97   01/28/19 09:00











Constitutional: Yes: Anxious


Eyes: Yes: Conjunctiva Clear


HENT: Yes: Atraumatic, Normocephalic


Neck: Yes: WNL


Cardiovascular: Yes: WNL


Respiratory: Yes: CTA Bilaterally


Gastrointestinal: Yes: Normal Bowel Sounds


...Rectal Exam: Yes: Deferred


Renal/: Yes: WNL


Musculoskeletal: Yes: Muscle Pain, Muscle Weakness


Extremities: Yes: Delayed Capillary Refill


Edema: No


Integumentary: Yes: WNL


Neurological: Yes: Alert, Oriented


Labs: 


 CBC, BMP





 01/28/19 06:20 





 01/28/19 06:20 











Problem List





- Problems


(1) CKD (chronic kidney disease)


Code(s): N18.9 - CHRONIC KIDNEY DISEASE, UNSPECIFIED   





(2) Change in mental status


Code(s): R41.82 - ALTERED MENTAL STATUS, UNSPECIFIED   


Qualifiers: 


   Altered mental status type: disorientation   Qualified Code(s): R41.0 - 

Disorientation, unspecified   





(3) Depression, major, recurrent, in partial remission


Code(s): F33.41 - MAJOR DEPRESSIVE DISORDER, RECURRENT, IN PARTIAL REMISSION   





(4) Alcohol dependence with uncomplicated withdrawal


Code(s): F10.230 - ALCOHOL DEPENDENCE WITH WITHDRAWAL, UNCOMPLICATED   





(5) Alcoholic liver disease


Code(s): K70.9 - ALCOHOLIC LIVER DISEASE, UNSPECIFIED   





Assessment/Plan





Current Active Problems





CKD (chronic kidney disease) (Acute)


Change in mental status (Acute)


Depression, major, recurrent, in partial remission (Acute)


UTI (urinary tract infection) (Acute)


dm type 2 neuropathy


 Abnormal Lab Results











  01/28/19 01/28/19





  06:20 06:20


 


RBC  3.53 L 


 


Hgb  11.4 L 


 


Hct  32.0 L 


 


Anion Gap   7 L


 


BUN   23 H


 


Creatinine   1.5 H


 


Random Glucose   194 H


 


Calcium   8.3 L


 


AST   13 L


 


Alkaline Phosphatase   155 H


 


Albumin   3.2 L








 Laboratory Results - last 24 hr











  01/28/19 01/28/19 01/28/19





  00:19 05:45 06:20


 


WBC    7.2


 


RBC    3.53 L


 


Hgb    11.4 L


 


Hct    32.0 L


 


MCV    90.7


 


MCH    32.3


 


MCHC    35.6


 


RDW    12.6


 


Plt Count    161


 


MPV    8.7


 


Sodium   


 


Potassium   


 


Chloride   


 


Carbon Dioxide   


 


Anion Gap   


 


BUN   


 


Creatinine   


 


Creat Clearance w eGFR   


 


POC Glucometer  291  184 


 


Random Glucose   


 


Calcium   


 


Total Bilirubin   


 


AST   


 


ALT   


 


Alkaline Phosphatase   


 


Total Protein   


 


Albumin   














  01/28/19 01/28/19 01/28/19





  06:20 11:34 16:53


 


WBC   


 


RBC   


 


Hgb   


 


Hct   


 


MCV   


 


MCH   


 


MCHC   


 


RDW   


 


Plt Count   


 


MPV   


 


Sodium  136  


 


Potassium  4.1  


 


Chloride  105  


 


Carbon Dioxide  24  


 


Anion Gap  7 L  


 


BUN  23 H  


 


Creatinine  1.5 H  


 


Creat Clearance w eGFR  47.42  


 


POC Glucometer   360  336


 


Random Glucose  194 H  


 


Calcium  8.3 L  


 


Total Bilirubin  0.4  


 


AST  13 L  


 


ALT  21  


 


Alkaline Phosphatase  155 H  


 


Total Protein  6.7  


 


Albumin  3.2 L  














  01/28/19





  21:58


 


WBC 


 


RBC 


 


Hgb 


 


Hct 


 


MCV 


 


MCH 


 


MCHC 


 


RDW 


 


Plt Count 


 


MPV 


 


Sodium 


 


Potassium 


 


Chloride 


 


Carbon Dioxide 


 


Anion Gap 


 


BUN 


 


Creatinine 


 


Creat Clearance w eGFR 


 


POC Glucometer  385


 


Random Glucose 


 


Calcium 


 


Total Bilirubin 


 


AST 


 


ALT 


 


Alkaline Phosphatase 


 


Total Protein 


 


Albumin 








 Laboratory Tests











  01/28/19 01/28/19 01/28/19





  00:19 05:45 06:20


 


Sodium    136


 


Potassium    4.1


 


Chloride    105


 


Carbon Dioxide    24


 


Anion Gap    7 L


 


BUN    23 H


 


POC Glucometer  291  184 


 


Random Glucose    194 H














  01/28/19 01/28/19 01/28/19





  11:34 16:53 21:58


 


Sodium   


 


Potassium   


 


Chloride   


 


Carbon Dioxide   


 


Anion Gap   


 


BUN   


 


POC Glucometer  360  336  385


 


Random Glucose   








plan:


nutrtion consult 


psychiatry


bgm novolog scale


outpatient diabetic training


levemir bid 14 units

## 2019-01-28 NOTE — PN
Progress Note, Physician


Chief Complaint: 





Patient is a 63 y/o male with past medical history of HTN, DM type 2, Cirrhosis

, ETOH abuse, and CKD.  As per ER documentation presented to ER with AMS and 

slurred speech after wife called EMS.  When interview patient he states he was 

experiencing dizziness.  He stated dizziness began at 10pm while watching TV 

and continued to this morning and his family was concerned and said he should 

go to the hospital. Patient has been seen in [facility] ER three times this week with 

most recent visit where patient left AMA. 


Consult is called to psychiatry today for ama and history of korsakoff 

psychosis. originally seeking ama yesterday after review of EMR.


As per primary nurse, Teresa DUNHAM, was acting strange and ama signed outs 

recently. 





- Current Medication List


Current Medications: 


Active Medications





Aspirin (Ecotrin -)  81 mg PO DAILY [facility]


   Last Admin: 01/28/19 10:26 Dose:  81 mg


Atorvastatin Calcium (Lipitor -)  80 mg PO HS [facility]


   Last Admin: 01/27/19 21:30 Dose:  80 mg


Escitalopram Oxalate (Lexapro -)  10 mg PO DAILY [facility]


   Last Admin: 01/28/19 10:26 Dose:  10 mg


Fenofibric Acid (Trilipix -)  45 mg PO DAILY [facility]


Folic Acid (Folic Acid -)  1 mg PO DAILY [facility]


   Last Admin: 01/28/19 10:26 Dose:  1 mg


Gabapentin (Neurontin -)  400 mg PO BID [facility]


   Last Admin: 01/28/19 10:26 Dose:  400 mg


Ceftriaxone Sodium 2 gm/ (Dextrose)  100 mls @ 200 mls/hr IVPB DAILY [facility]; 

Protocol


   Last Admin: 01/28/19 10:25 Dose:  200 mls/hr


Insulin Aspart (Novolog Vial Sliding Scale -)  1 vial SQ ACHS [facility]; Protocol


   Last Admin: 01/28/19 11:57 Dose:  8 unit


Insulin Detemir (Levemir Vial)  12 units SQ BIDI [facility]


Lisinopril (Prinivil)  20 mg PO DAILY [facility]


   Last Admin: 01/28/19 10:26 Dose:  20 mg


Nifedipine (Procardia Xl -)  90 mg PO DAILY [facility]


   Last Admin: 01/28/19 10:26 Dose:  90 mg


Pantoprazole Sodium (Protonix -)  40 mg PO DAILY [facility]


   Last Admin: 01/28/19 10:26 Dose:  40 mg


Quetiapine Fumarate (Seroquel -)  200 mg PO [facility]


   Last Admin: 01/27/19 21:30 Dose:  200 mg


Senna (Senna -)  2 tab PO [facility]


   Last Admin: 01/27/19 21:30 Dose:  2 tab


Tamsulosin HCl (Flomax -)  0.4 mg PO DAILY@0830 [facility]


   Last Admin: 01/28/19 09:19 Dose:  0.4 mg


Thiamine HCl (Vitamin B1 -)  100 mg PO DAILY [facility]


   Last Admin: 01/28/19 10:26 Dose:  100 mg











- Objective


Vital Signs: 


 Vital Signs











Temperature  98.2 F   01/28/19 10:00


 


Pulse Rate  96 H  01/28/19 10:00


 


Respiratory Rate  18   01/28/19 10:00


 


Blood Pressure  122/73   01/28/19 10:00


 


O2 Sat by Pulse Oximetry (%)  97   01/28/19 09:00











Psychiatric: Yes: WNL (denies suicidal or homicidal ideation, see mental status 

exam.), Alert, Oriented


Labs: 


 CBC, BMP





 01/28/19 06:20 





 01/28/19 06:20 











Problem List





- Problems


(1) Depression, major, recurrent, in partial remission


Code(s): F33.41 - MAJOR DEPRESSIVE DISORDER, RECURRENT, IN PARTIAL REMISSION   





Assessment/Plan





He stated his depressive symptoms is 5/10, denies anxiety, speech is quivering 

but goal directed, client is currently maintained on lexapro 10 mg per day. In 

past attempted increase to 30 mg ? got severe gi symptoms and stopped taking 

after consult with his doctor. will not increase current dose, 20mg is maximum 

dose. 


He verbalized poor sleep pattern, "only 2 hr at night", may be related to 

alcoholic history, stated he stopped in 2010 after  drinking circa 30 beers/ 

week for 20 years. Client is also taking neurontin 400mg po bid. 


Seroqul is assisting augment ssri, is already at 200mg at night, will not 

titrate. client may have some diurnal variation in mood, versus higher 

potential to have cognitive/ Alzheimer due to korsakoffs history. currently not 

psychotic. No need for acute inpatient psychiatry. 


no confabulation or acute confusion currently. His gait is stable. 





We recommend follow up with Dr Baez at mental health clinic on [facility]. last visit there in august 2018.

## 2019-01-28 NOTE — PN
Progress Note, Physician


Chief Complaint: 





AMS


History of Present Illness: 





NAD


wanted to leave [place], but stayed?


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


Hx of Korsakoff psychosis


This is his 3rd visit in 3 days due to AMS


last admission also left A





- Current Medication List


Current Medications: 


Active Medications





Aspirin (Ecotrin -)  81 mg PO DAILY [facility]


   Last Admin: 01/28/19 10:26 Dose:  81 mg


Atorvastatin Calcium (Lipitor -)  80 mg PO HS [facility]


   Last Admin: 01/27/19 21:30 Dose:  80 mg


Escitalopram Oxalate (Lexapro -)  10 mg PO DAILY [facility]


   Last Admin: 01/28/19 10:26 Dose:  10 mg


Fenofibric Acid (Trilipix -)  45 mg PO DAILY [facility]


Folic Acid (Folic Acid -)  1 mg PO DAILY [facility]


   Last Admin: 01/28/19 10:26 Dose:  1 mg


Gabapentin (Neurontin -)  400 mg PO BID [facility]


   Last Admin: 01/28/19 10:26 Dose:  400 mg


Ceftriaxone Sodium 2 gm/ (Dextrose)  100 mls @ 200 mls/hr IVPB DAILY [facility]; 

Protocol


   Last Admin: 01/28/19 10:25 Dose:  200 mls/hr


Insulin Aspart (Novolog Vial Sliding Scale -)  1 vial SQ ACHS [facility]; Protocol


   Last Admin: 01/28/19 06:01 Dose:  Not Given


Insulin Detemir (Levemir Vial)  12 units SQ BID [facility]


Lisinopril (Prinivil)  20 mg PO DAILY [facility]


   Last Admin: 01/28/19 10:26 Dose:  20 mg


Nifedipine (Procardia Xl -)  90 mg PO DAILY [facility]


   Last Admin: 01/28/19 10:26 Dose:  90 mg


Pantoprazole Sodium (Protonix -)  40 mg PO DAILY [facility]


   Last Admin: 01/28/19 10:26 Dose:  40 mg


Quetiapine Fumarate (Seroquel -)  200 mg PO HS [facility]


   Last Admin: 01/27/19 21:30 Dose:  200 mg


Senna (Senna -)  2 tab PO HS [facility]


   Last Admin: 01/27/19 21:30 Dose:  2 tab


Tamsulosin HCl (Flomax -)  0.4 mg PO DAILY@0830 [facility]


   Last Admin: 01/28/19 09:19 Dose:  0.4 mg


Thiamine HCl (Vitamin B1 -)  100 mg PO DAILY [facility]


   Last Admin: 01/28/19 10:26 Dose:  100 mg











- Objective


Vital Signs: 


 Vital Signs











Temperature  98.2 F   01/28/19 10:00


 


Pulse Rate  96 H  01/28/19 10:00


 


Respiratory Rate  18   01/28/19 10:00


 


Blood Pressure  122/73   01/28/19 10:00


 


O2 Sat by Pulse Oximetry (%)  99   01/27/19 20:57











Constitutional: Yes: Well Nourished, No Distress, Anxious


Cardiovascular: Yes: Regular Rate and Rhythm


Respiratory: Yes: Regular


Gastrointestinal: Yes: Normal Bowel Sounds, Soft


Musculoskeletal: Yes: WNL


Extremities: Yes: WNL


Edema: No


Peripheral Pulses WNL: Yes


Neurological: Yes: Alert, Oriented


Psychiatric: Yes: Alert, Oriented, Agitated (intermittent)


Labs: 


 CBC, BMP





 01/28/19 06:20 





 01/28/19 06:20 











Problem List





- Problems


(1) CKD (chronic kidney disease)


Assessment/Plan: 


-Cr at baseline


-monitor trend


Code(s): N18.9 - CHRONIC KIDNEY DISEASE, UNSPECIFIED   





(2) Diabetes


Assessment/Plan: 


-A1c at 9.5


-diabetic low sodium diet


-restart levemir at 12 U po BID


-Novolog AC HS


-BGM AC HS


-Endocrinology consult


Code(s): E11.9 - TYPE 2 DIABETES MELLITUS WITHOUT COMPLICATIONS   


Qualifiers: 


   Diabetes mellitus type: type 2 





(3) Alcoholic liver disease


Code(s): K70.9 - ALCOHOLIC LIVER DISEASE, UNSPECIFIED   





(4) Wernicke encephalopathy syndrome


Code(s): E51.2 - WERNICKE'S ENCEPHALOPATHY   





(5) Korsakoff psychosis


Code(s): F04 - AMNESTIC DISORDER DUE TO KNOWN PHYSIOLOGICAL CONDITION   





Assessment/Plan





see problem list

## 2019-01-28 NOTE — PN
Mental Health Exam





- Mental Status Exam


Alert and Oriented to: Time, Place, Person


Cognitive Function: Grossly Intact


Patient Appearance: Unkempt (UNSHAVEN, IN BRIGHT RED POLO)


Mood: Depressed, Withdrawn, Apprehensive


Affect: Mood Congruent


Patient Behavior: Appropriate, Cooperative


Speech Pattern: Clear, Appropriate


Voice Loudness: Mildly Soft/Quiet, Hypophonia (MONOTONE, ACCENTED VOICE. )


Thought Process: Goal Oriented (GETTING WATER TO DRINK. )


Thought Disorder: Not Present


Hallucinations: None, Denies


Suicidal Ideation: None, Denies ("I WILL NEVER DO THAT, BUT I AM DEPRESSED". )


Homicidal Ideation: None


Insight/Judgement: Fair


Sleep: Poorly ("I SLEP 2 HOURS, ITS MY PROBLEM". )


Appetite: Good (BMI >30)


Muscle strength/Tone: Normal


Gait/Station: Normal (TREMOLOUS VOICE...)

## 2019-01-29 NOTE — CONS
DATE OF CONSULTATION:  

 

DATE OF DICTATION:  01/29/2019

 

HISTORY:  A 62-year-old diabetic male evaluated for possible urinary tract infection.

 The patient was admitted to the hospital on January 26, 2019 with reports of altered

mental status and dysarthria.  CT scan of the head was negative for infarct or bleed.

 Recent MRI of the brain showed no evidence of acute infarct or bleed.  He was noted

to have pyuria.  The patient denies any dysuria or hematuria.  No complaints of

suprapubic or flank pain.  He is afebrile with a normal white blood cell count. 

Blood and urine cultures are negative.

 

PAST MEDICAL HISTORY:  Positive for hypertension, diabetes, chronic kidney disease,

cirrhosis.

 

ALLERGIES:  No known allergies.

 

LABORATORY DATA:  White count 7.2, creatinine 1.5.  Urinalysis 1840 white cells. 

Urine culture negative.  Blood cultures negative.

 

PHYSICAL EXAMINATION: 

General:  He is awake and alert.  He is in no acute distress.  Offers no complaints. 

Vital Signs:  Temperature 98, blood pressure 150/90, pulse 97 and regular,

respirations 18 per minute.

HEENT:  Sclerae anicteric.

Heart:  S1, S2.  

Lungs:  Clear. 

Abdomen:  Soft.  No suprapubic or flank tenderness.  

Extremities:  Negative for edema.

 

IMPRESSION: 

1.  Possible urinary tract infection.

2.  Altered mental status, possible toxic metabolic encephalopathy now resolved.

 

PLAN:  Advised oral antibiotic therapy with Macrobid 100 mg p.o. b.i.d. for 7 days. 

Outpatient follow up.  No evidence of systemic infection.

 

Thank you for the kind referral.

 

 

XANDER BATRES M.D.

 

[id number]

DD: 01/29/2019 12:12

DT: 01/29/2019 12:50

Job #:  [id number]

## 2019-01-29 NOTE — CONSULT
Consult - text type





- Consultation


Consultation Note: 





Renal consult for CKD





This is a 62 year old  gentleman with hx of CKD, DM2, Cirrhosis, ETOH 

abuse who presented with dizziness, AMS, slurred speech and found to have UTI 

with Cr of 1.5. Pt without any acute complaints. No SOB, CP, abd pain. Reports 

good oral intake. Denies any dysuria, flank pain, hematuria. For possible 

discharge today.





PMhx: as above


Allergies: NDKA


Family Hx: NC


Social Hx: + etoh abuse


ROS: as per HPI, all other pertinent ros negative


 Home Medications











 Medication  Instructions  Recorded


 


Aspirin [Aspirin EC] 81 mg PO DAILY 08/07/18


 


Bisacodyl [Dulcolax] 5 mg PO TID PRN 08/07/18


 


Folic Acid 1 mg PO DAILY 08/07/18


 


Gabapentin [Neurontin] 400 mg PO BID 08/07/18


 


Lisinopril 30 mg PO DAILY 08/07/18


 


Mirtazapine [Remeron -] 30 mg PO DAILY 08/07/18


 


Pantoprazole Sodium [Protonix] 40 mg PO DAILY 08/07/18


 


Tamsulosin HCl [Flomax] 0.4 mg PO HS 08/07/18


 


Atorvastatin Ca [Lipitor] 80 mg PO HS 01/23/19


 


Ergocalciferol (Vitamin D2) 50,000 unit PO WEEKLY 01/23/19





[Vitamin D2]  


 


Escitalopram Oxalate [Lexapro -] 10 mg PO DAILY 01/23/19


 


Insulin Glargine,Hum.rec.anlog 100 unit SQ 1800 01/23/19





[Basaglar Kwikpen U-100]  


 


Insulin Lispro [Humalog] 10 - 15 unit SQ ACHS PRN 01/23/19


 


Nifedipine [Nifedipine ER] 90 mg PO DAILY 01/23/19


 


Oxycodone HCl/Acetaminophen 1 tab PO Q4H PRN 01/23/19





[Percocet 5-325 mg Tablet]  


 


Thiamine Mononitrate [Vitamin B-1] 100 mg PO DAILY 01/23/19


 


Fenofibric Acid [Trilipix -] 45 mg PO DAILY #30 cap 01/29/19


 


Insulin Detemir [Levemir Flextouch] 100 unit SQ BID #1 insuln.pen 01/29/19


 


Insulin Lispro [Humalog Kwikpen 100 unit SQ AC #1 insuln.pen 01/29/19





U-100]  


 


Nitrofurantoin Monohyd/M-Cryst 100 mg PO BID #14 capsule 01/29/19





[Macrobid -]  


 


Quetiapine Fumarate [Seroquel -] 200 mg PO HS #30 tab 01/29/19








 Vital Signs











Temperature  98 F   01/29/19 10:00


 


Pulse Rate  97 H  01/29/19 10:00


 


Respiratory Rate  18   01/29/19 10:00


 


Blood Pressure  150/90   01/29/19 10:00


 


O2 Sat by Pulse Oximetry (%)  99   01/28/19 21:00








 Intake & Output











 01/26/19 01/27/19 01/28/19 01/29/19





 23:59 23:59 23:59 23:59


 


Intake Total  710 1100 200


 


Balance  710 1100 200


 


Weight 74.843 kg 76.476 kg 77.519 kg 








NAD


neck supple


MMM


RRR


CTA


soft NT/ND


no LE edema


no bladder distension 








 CBC, BMP





 01/28/19 06:20 





 01/28/19 06:20 








 Current Medications





Aspirin (Ecotrin -)  81 mg PO DAILY [facility]


   Last Admin: 01/29/19 10:29 Dose:  81 mg


Atorvastatin Calcium (Lipitor -)  80 mg PO HS [facility]


   Last Admin: 01/28/19 22:00 Dose:  80 mg


Escitalopram Oxalate (Lexapro -)  10 mg PO DAILY [facility]


   Last Admin: 01/29/19 10:29 Dose:  10 mg


Fenofibric Acid (Trilipix -)  45 mg PO DAILY [facility]


   Last Admin: 01/29/19 10:33 Dose:  45 mg


Folic Acid (Folic Acid -)  1 mg PO DAILY [facility]


   Last Admin: 01/29/19 10:30 Dose:  1 mg


Gabapentin (Neurontin -)  400 mg PO BID [facility]


   Last Admin: 01/29/19 10:29 Dose:  400 mg


Ceftriaxone Sodium 2 gm/ (Dextrose)  100 mls @ 200 mls/hr IVPB DAILY [facility]; 

Protocol


   Last Admin: 01/29/19 10:31 Dose:  200 mls/hr


Insulin Aspart (Novolog Vial Sliding Scale -)  1 vial SQ ACHS [facility]; Protocol


   Last Admin: 01/29/19 06:50 Dose:  2 unit


Insulin Detemir (Levemir Vial)  14 units SQ BIDI [facility]


   Last Admin: 01/29/19 06:50 Dose:  14 units


Lisinopril (Prinivil)  20 mg PO DAILY [facility]


   Last Admin: 01/29/19 10:29 Dose:  20 mg


Nifedipine (Procardia Xl -)  90 mg PO DAILY [facility]


   Last Admin: 01/29/19 10:29 Dose:  90 mg


Pantoprazole Sodium (Protonix -)  40 mg PO DAILY [facility]


   Last Admin: 01/29/19 10:29 Dose:  40 mg


Quetiapine Fumarate (Seroquel -)  200 mg PO [facility]


   Last Admin: 01/28/19 22:00 Dose:  200 mg


Senna (Senna -)  2 tab PO [facility]


   Last Admin: 01/28/19 22:00 Dose:  2 tab


Tamsulosin HCl (Flomax -)  0.4 mg PO DAILY@0830 [facility]


   Last Admin: 01/29/19 10:29 Dose:  0.4 mg


Thiamine HCl (Vitamin B1 -)  100 mg PO DAILY [facility]


   Last Admin: 01/29/19 10:29 Dose:  100 mg





62 year old  gentleman with hx of CKD, DM2, Cirrhosis, ETOH abuse who 

presented with dizziness, AMS, slurred speech and found to have UTI with Cr of 

1.5.





#Dizziness w/o evidence of CVA


#Cystitis 


#CKD (stable)


#HTN


#BPH


#ETOH abuse





Renal function stable at this time 


continue lisinpril as an outpatient


no evidence of urinary retention


continue flomax


follow up with renal as an outpatient


continue oral Abx as per primary


discharge planning





Thank you 


Michael Reid DO

## 2019-01-29 NOTE — DS
Physical Examination


Vital Signs: 


 Vital Signs











Temperature  98 F   01/29/19 10:00


 


Pulse Rate  97 H  01/29/19 10:00


 


Respiratory Rate  18   01/29/19 10:00


 


Blood Pressure  150/90   01/29/19 10:00


 


O2 Sat by Pulse Oximetry (%)  99   01/28/19 21:00











Findings/Remarks: 








Patient is a 61 y/o male with past medical history of HTN, DM type 2, Cirrhosis

, ETOH abuse, and CKD.  As per ER documentation presented to ER with AMS and 

slurred speech after wife called EMS.  When interview patient he states he was 

experiencing dizziness last night.  He stated dizziness began at 10pm while 

watching TV and continued to this morning and his family was concerned and said 

he should go to the hospital. Patient has been seen in [facility] ER three times this 

week with most recent visit where patient left AMA.  UA show 3+ leukocytes. 

Head CT scan performed in ER.  Patient denies dizziness, chest pain, or SOB.


Constitutional: Yes: Well Nourished, No Distress, Calm


Cardiovascular: Yes: Regular Rate and Rhythm


Respiratory: Yes: Regular


Gastrointestinal: Yes: Normal Bowel Sounds, Soft


Musculoskeletal: Yes: WNL


Extremities: Yes: WNL


Edema: No


Peripheral Pulses WNL: Yes


Neurological: Yes: Alert, Oriented


Psychiatric: Yes: Alert, Oriented


Labs: 


 CBC, BMP





 01/28/19 06:20 





 01/28/19 06:20 











Discharge Summary


Reason For Visit: UTI,AMS


Current Active Problems





CKD (chronic kidney disease) (Acute)


Change in mental status (Acute)


Depression, major, recurrent, in partial remission (Acute)


UTI (urinary tract infection) (Acute)








Hospital Course: 





 Laboratory Last Values











WBC  7.2 K/mm3 (4.0-10.0)   01/28/19  06:20    


 


RBC  3.53 M/mm3 (4.00-5.60)  L  01/28/19  06:20    


 


Hgb  11.4 GM/dL (11.7-16.9)  L  01/28/19  06:20    


 


Hct  32.0 % (35.4-49)  L  01/28/19  06:20    


 


MCV  90.7 fl (80-96)   01/28/19  06:20    


 


MCH  32.3 pg (25.7-33.7)   01/28/19  06:20    


 


MCHC  35.6 g/dl (32.0-35.9)   01/28/19  06:20    


 


RDW  12.6 % (11.9-15.9)   01/28/19  06:20    


 


Plt Count  161 K/MM3 (134-434)   01/28/19  06:20    


 


MPV  8.7 fl (7.5-11.1)   01/28/19  06:20    


 


Absolute Neuts (auto)  3.2 K/mm3 (1.5-8.0)   01/26/19  08:05    


 


Neutrophils %  53.2 % (42.8-82.8)   01/26/19  08:05    


 


Lymphocytes %  31.0 % (8-40)   01/26/19  08:05    


 


Monocytes %  12.4 % (3.8-10.2)  H  01/26/19  08:05    


 


Eosinophils %  3.2 % (0-4.5)   01/26/19  08:05    


 


Basophils %  0.2 % (0-2.0)   01/26/19  08:05    


 


Nucleated RBC %  0 % (0-0)   01/26/19  08:05    


 


VBG pH  7.34  (7.32-7.42)   01/26/19  08:50    


 


POC VBG pCO2  51.0 mmHg (38-52)   01/26/19  08:50    


 


POC VBG pO2  22.8 mmHg (28-48)  L  01/26/19  08:50    


 


Mixed VBG HCO3  27.0 meq/L (19-25)  H  01/26/19  08:50    


 


Sodium  136 mmol/L (136-145)   01/28/19  06:20    


 


Potassium  4.1 mmol/L (3.5-5.1)   01/28/19  06:20    


 


Chloride  105 mmol/L ()   01/28/19  06:20    


 


Carbon Dioxide  24 mmol/L (21-32)   01/28/19  06:20    


 


Anion Gap  7 MMOL/L (8-16)  L  01/28/19  06:20    


 


BUN  23 mg/dL (7-18)  H  01/28/19  06:20    


 


Creatinine  1.5 mg/dL (0.55-1.3)  H  01/28/19  06:20    


 


Creat Clearance w eGFR  47.42  (>60)   01/28/19  06:20    


 


POC Glucometer  249 UNITS ()   01/29/19  05:43    


 


Random Glucose  194 mg/dL ()  H  01/28/19  06:20    


 


Hemoglobin A1c %  9.5 % (4.2-6.3)  H  01/27/19  06:00    


 


Lactic Acid  1.1 mmol/L (0.4-2.0)   01/26/19  08:35    


 


Calcium  8.3 mg/dL (8.5-10.1)  L  01/28/19  06:20    


 


Total Bilirubin  0.4 mg/dL (0.2-1)   01/28/19  06:20    


 


AST  13 U/L (15-37)  L  01/28/19  06:20    


 


ALT  21 U/L (13-61)   01/28/19  06:20    


 


Alkaline Phosphatase  155 U/L ()  H  01/28/19  06:20    


 


Ammonia  < 10.00 umol/L (11-32)  L  01/26/19  08:45    


 


Creatine Kinase  216 U/L ()   01/26/19  07:48    


 


Creatine Kinase Index  2.2 % (0.0-5.0)   01/26/19  07:48    


 


CK-MB (CK-2)  4.8 ng/mL (0.5-3.6)  H  01/26/19  07:48    


 


Troponin I  < 0.02 ng/ml (0.00-0.05)   01/26/19  07:48    


 


Total Protein  6.7 g/dl (6.4-8.2)   01/28/19  06:20    


 


Albumin  3.2 g/dl (3.4-5.0)  L  01/28/19  06:20    


 


Triglycerides  200 mg/dL (0-150)  H  01/27/19  06:00    


 


Cholesterol  179 mg/dL ()   01/27/19  06:00    


 


Total LDL Cholesterol  115 mg/dL (5-100)  H  01/27/19  06:00    


 


HDL Cholesterol  34 mg/dL (40-60)  L  01/27/19  06:00    


 


Urine Color  Yellow   01/26/19  13:30    


 


Urine Appearance  Turbid   01/26/19  13:30    


 


Urine pH  6.0  (5.0-8.0)   01/26/19  13:30    


 


Ur Specific Gravity  1.011  (1.010-1.035)   01/26/19  13:30    


 


Urine Protein  Negative  (NEGATIVE)   01/26/19  13:30    


 


Urine Glucose (UA)  Negative  (NEGATIVE)   01/26/19  13:30    


 


Urine Ketones  Negative  (NEGATIVE)   01/26/19  13:30    


 


Urine Blood  1+  (NEGATIVE)  H  01/26/19  13:30    


 


Urine Nitrite  Negative  (NEGATIVE)   01/26/19  13:30    


 


Urine Bilirubin  Negative  (<2.0 mg/dL)   01/26/19  13:30    


 


Urine Urobilinogen  Negative mg/dL (0.2-1.0)   01/26/19  13:30    


 


Ur Leukocyte Esterase  3+  (NEGATIVE)  H  01/26/19  13:30    


 


Urine WBC (Auto)  1840 /hpf (3-5)   01/26/19  13:30    


 


Urine RBC (Auto)  9 /hpf (0-3)   01/26/19  13:30    


 


Ur Epithelial Cells  Rare /HPF (FEW)   01/26/19  13:30    








 Microbiology





01/26/19 08:00   Blood - Peripheral Venous   Blood Culture - Preliminary


                            NO GROWTH OBTAINED AFTER 72 HOURS, INCUBATION TO 

CONTINUE


                            FOR 2 DAYS.


01/26/19 08:00   Blood - Peripheral Venous   Blood Culture - Preliminary


                            NO GROWTH OBTAINED AFTER 72 HOURS, INCUBATION TO 

CONTINUE


                            FOR 2 DAYS.


01/26/19 07:48   Urine - Urine Clean Catch   Urine Culture - Final


                            NO GROWTH OBTAINED








Condition: Stable





- Instructions


Diet, Activity, Other Instructions: 


nitrofurantoin 100 mg 2 x day for 7 days  


Referrals: 


Milena Vang MD [Staff Physician] - 


Disposition: VNS/HOME HEALTH CARE





- Home Medications


Comprehensive Discharge Medication List: 


Ambulatory Orders





Aspirin [Aspirin EC] 81 mg PO DAILY 08/07/18 


Bisacodyl [Dulcolax] 5 mg PO TID PRN 08/07/18 


Folic Acid 1 mg PO DAILY 08/07/18 


Gabapentin [Neurontin] 400 mg PO BID 08/07/18 


Lisinopril 30 mg PO DAILY 08/07/18 


Mirtazapine [Remeron -] 30 mg PO DAILY 08/07/18 


Pantoprazole Sodium [Protonix] 40 mg PO DAILY 08/07/18 


Tamsulosin HCl [Flomax] 0.4 mg PO HS 08/07/18 


Atorvastatin Ca [Lipitor] 80 mg PO HS 01/23/19 


Ergocalciferol (Vitamin D2) [Vitamin D2] 50,000 unit PO WEEKLY 01/23/19 


Escitalopram Oxalate [Lexapro -] 10 mg PO DAILY 01/23/19 


Insulin Glargine,Hum.rec.anlog [Basaglar Kwikpen U-100] 100 unit SQ 1800 01/23/ 19 


Insulin Lispro [Humalog] 10 - 15 unit SQ ACHS PRN 01/23/19 


Nifedipine [Nifedipine ER] 90 mg PO DAILY 01/23/19 


Oxycodone HCl/Acetaminophen [Percocet 5-325 mg Tablet] 1 tab PO Q4H PRN 01/23/ 19 


Thiamine Mononitrate [Vitamin B-1] 100 mg PO DAILY 01/23/19 


Fenofibric Acid [Trilipix -] 45 mg PO DAILY #30 cap 01/29/19 


Nitrofurantoin Monohyd/M-Cryst [Macrobid -] 100 mg PO BID #14 capsule 01/29/19 


Quetiapine Fumarate [Seroquel -] 200 mg PO HS #30 tab 01/29/19

## 2019-01-29 NOTE — PN
Progress Note (short form)





- Note


Progress Note: 





ID CONSULT DICTATED


UTI


S/P ALTERED MENTATION- RESOLVED


CULTURES NEGATIVE


NO EVIDENCE OF SYSTEMIC INFECTION


ADVISE MACROBID 100MG PO BID X 7D

## 2019-03-11 NOTE — PDOC
Attending Attestation





- Resident


Resident Name: Alphonse Francis





- ED Attending Attestation


I have performed the following: I have examined & evaluated the patient, The 

case was reviewed & discussed with the resident, I agree w/resident's findings 

& plan, Exceptions are as noted yes...

## 2019-06-29 NOTE — PDOC
History of Present Illness





- General


Chief Complaint: Blood Sugar Problem


Stated Complaint: BLOOD SUGAR PROBLEM


Time Seen by Provider: 06/29/19 04:56





- History of Present Illness


Initial Comments: 


Justin Lloyd is a 62yo man with a PMH of HTN, IDDM, alcohol abuse, cirrhosis

, CKD who was BIBA with hypoglycemia to the 50's, improved to 200 per EMS after 

receiving dextrose. Mr Lloyd denies that anything happened tonight and 

declines to answer any questions. 








Past History





- Past Medical History


Allergies/Adverse Reactions: 


 Allergies











Allergy/AdvReac Type Severity Reaction Status Date / Time


 


No Known Allergies Allergy   Verified 06/29/19 04:25











Home Medications: 


Ambulatory Orders





Aspirin [Aspirin EC] 81 mg PO DAILY 08/07/18 


Bisacodyl [Dulcolax] 5 mg PO TID PRN 08/07/18 


Folic Acid 1 mg PO DAILY 08/07/18 


Gabapentin [Neurontin] 400 mg PO BID 08/07/18 


Lisinopril 30 mg PO DAILY 08/07/18 


Mirtazapine [Remeron -] 30 mg PO DAILY 08/07/18 


Pantoprazole Sodium [Protonix] 40 mg PO DAILY 08/07/18 


Tamsulosin HCl [Flomax] 0.4 mg PO HS 08/07/18 


Atorvastatin Ca [Lipitor] 80 mg PO HS 01/23/19 


Ergocalciferol (Vitamin D2) [Vitamin D2] 50,000 unit PO WEEKLY 01/23/19 


Escitalopram Oxalate [Lexapro -] 10 mg PO DAILY 01/23/19 


Nifedipine [Nifedipine ER] 90 mg PO DAILY 01/23/19 


Oxycodone HCl/Acetaminophen [Percocet 5-325 mg Tablet] 1 tab PO Q4H PRN 01/23/ 19 


Thiamine Mononitrate [Vitamin B-1] 100 mg PO DAILY 01/23/19 


Fenofibric Acid [Trilipix -] 45 mg PO DAILY #30 cap 01/29/19 


Insulin Glargine,Hum.rec.anlog [Basaglar Kwikpen U-100] 100 unit SQ BID #1 ea 01 /29/19 


Insulin Lispro [Admelog Solostar] 100 unit SQ AC #1 insuln.pen 01/29/19 


Nitrofurantoin Monohyd/M-Cryst [Macrobid -] 100 mg PO BID #14 capsule 01/29/19 


Quetiapine Fumarate [Seroquel -] 200 mg PO HS #30 tab 01/29/19 








Anemia: Yes


Asthma: No


Cancer: No


Cardiac Disorders: No


CVA: Yes


COPD: No


CHF: No


DVT: No


Dementia: No


Diabetes: Yes


Dialysis: Yes


GI Disorders: No


HTN: Yes


Hypercholesterolemia: No


Liver Disease: Yes


Psychiatric Problems: Yes


Seizures: Yes


Thyroid Disease: No





- Surgical History


Abdominal Surgery: No


Appendectomy: No


Cardiac Surgery: No


Cholecystectomy: No


Lung Surgery: No


Neurologic Surgery: Yes


Orthopedic Surgery: No





- Immunization History


Immunization Up to Date: Yes





- Suicide/Smoking/Psychosocial Hx


Smoking Status: No


Smoking History: Unknown if ever smoked


Have you smoked in the past 12 months: No


Number of Cigarettes Smoked Daily: 0


Cigars Per Day: 0


Information on smoking cessation initiated: No


Hx Alcohol Use: No


Drug/Substance Use Hx: No


Substance Use Type: None


Hx Substance Use Treatment: No





**Review of Systems





- Review of Systems


Comments:: 


Could not obtain. Refused to answer questions.





*Physical Exam





- Vital Signs


 Last Vital Signs











Temp Pulse Resp BP Pulse Ox


 


 98.4 F   92 H  22 H  144/94   97 


 


 06/29/19 04:32  06/29/19 04:32  06/29/19 04:32  06/29/19 04:25  06/29/19 04:32














- Physical Exam


Comments: 


General: Intoxicated, in no acute distress


HEENT: Atraumatic, PERRL, EOMI, MMM


Cards: RRR, no murmur appreciated


Pulm: Comfortable on room air, clear to auscultation bilaterally


Abd: Soft, nontender, nondistended


Ext: Atraumatic. No LE edema. Moves all extremities


Vasc: Extremities WWP 


Skin: Normal color, no rashes or lesions


Neuro: Awake, intoxicated, CN grossly intact, slurred speech, motor/sensory 

grossly intact and symmetric














ED Treatment Course





- LABORATORY


CBC & Chemistry Diagram: 


 06/29/19 05:46





 06/29/19 05:46





- ADDITIONAL ORDERS


Additional order review: 


 Laboratory  Results











  06/29/19





  04:39


 


POC Glucometer  153








 











  06/29/19





  04:39


 


POC Glucometer  153














Medical Decision Making





- Medical Decision Making


Justin Lloyd is a 62yo man with a PMH of HTN, IDDM, alcohol abuse, cirrhosis

, CKD who was BIBA with hypoglycemia to the 50's, improved to 200 per EMS after 

receiving dextrose. 





- Recheck glucose 153 on arrival


- Denies any symptoms currently. Appears intoxicated. States he did not eat 

dinner after taking his insulin. Most likely hypoglycemic due to not eating, 

but will send labs and UA to evalute for other abnormalities that could cause 

hypoglycemia


- Banana bag





06/29/19 07:07


- CBC unremarkable


- Alcohol level negative. UDS added due to apparent intoxication


- Chemistry pending


- Now sleeping comfortably


- Signed out to Dr Stevens








Discussed with Dr Carter.





Adriana Hebert


PGY1





*DC/Admit/Observation/Transfer


Diagnosis at time of Disposition: 


Change in mental status


Qualifiers:


 Altered mental status type: unspecified Qualified Code(s): R41.82 - Altered 

mental status, unspecified








- Referrals





- Patient Instructions





- Post Discharge Activity

## 2019-06-29 NOTE — PDOC
*Physical Exam





- Vital Signs


 Last Vital Signs











Temp Pulse Resp BP Pulse Ox


 


 98.4 F   92 H  22 H  144/94   97 


 


 06/29/19 04:32  06/29/19 04:32  06/29/19 04:32  06/29/19 04:25  06/29/19 04:32














- Physical Exam


Comments: 





06/29/19 07:50


Gen: sleeping, easily arousable, aaox3


heart: +s1s2 reg


lungs: cta b/l


abd: soft, nt/nd +bs


ext: no c/c/e


neuro: no focal findings, neuro intact, ambulatory with a steady gait








ED Treatment Course





- LABORATORY


CBC & Chemistry Diagram: 


 06/29/19 05:46





 06/29/19 05:46





- ADDITIONAL ORDERS


Additional order review: 


 Laboratory  Results











  06/29/19 06/29/19 06/29/19





  05:46 05:46 04:39


 


Sodium  138  


 


Potassium  3.8  


 


Chloride  105  


 


Carbon Dioxide  24  


 


Anion Gap  9  


 


BUN  33.5 H  


 


Creatinine  2.7 H  


 


Est GFR (CKD-EPI)AfAm  27.81  


 


Est GFR (CKD-EPI)NonAf  24.00  


 


POC Glucometer    153


 


Random Glucose  110 H  


 


Calcium  8.9  


 


Magnesium   2.5 H 


 


Total Bilirubin  0.3  


 


AST  24  


 


ALT  21  


 


Alkaline Phosphatase  122 H  


 


Total Protein  7.8  


 


Albumin  4.0  


 


Alcohol, Quantitative  < 3.0  








 











  06/29/19 06/29/19





  05:46 04:39


 


RBC  3.72 L 


 


MCV  90.8 


 


MCHC  34.2 


 


RDW  13.6 


 


MPV  7.4 L D 


 


Neutrophils %  54.5 


 


Lymphocytes %  31.4 


 


Monocytes %  11.4 H 


 


Eosinophils %  2.3 


 


Basophils %  0.4 


 


POC Glucometer   153














- Medications


Given in the ED: 


ED Medications














Discontinued Medications














Generic Name Dose Route Start Last Admin





  Trade Name Freq  PRN Reason Stop Dose Admin


 


Lactated Ringer's  1,000 ml  06/29/19 07:18  06/29/19 07:36





  Lactated Ringers Solution  IV  06/29/19 07:19  1,000 ml





  ONCE ONE   Administration





     





     





     





     














Medical Decision Making





- Medical Decision Making





06/29/19 07:50


a/p: 62yo male with low blood glucose pta-signed out from Dr. Carter at 7am


-pt states he has not drank in 6 years


-labs sent from overnight shows EMILIE, pt receiving IVF hydration


-states eating normally but not drinking water


-suspect dehydration from decreased water intake and hot weather


-will give another liter IVF hydration


-pt ambulatory to the bathroom to give urine sample


-will repeat chem after 2l nss


-will po challenge with breakfast


-pt states he feels much better this AM. 


06/29/19 08:52


no uti


no drugs in urine


pt states he wants to go home, will not wait for repeat labs


pt walking around the ER 





06/29/19 08:56


pt states he wants to sign out AMA


resident discussed with the patient, 





Note:  The patient insists on leaving the emergency dept and is signing out 

against medical advice.  





The patient understands the risks and complications that may result from the 

refusal of medical care and admission which includes death and permanent 

disability.  The patient has the mental capacity of understanding the risks of 

refusing care and is capable of making an informed decision.  





The patient was instructed to return to the emergency department should he 

change he mind regarding medical care or should his condition worsen.





The patient signed the Against Medical Advice form.





*DC/Admit/Observation/Transfer


Diagnosis at time of Disposition: 


 EMILIE (acute kidney injury)





Change in mental status


Qualifiers:


 Altered mental status type: unspecified Qualified Code(s): R41.82 - Altered 

mental status, unspecified








- Discharge Dispostion


Disposition: AGAINST MEDICAL ADVICE


Condition at time of disposition: Unchanged/Unknown





- Referrals





- Patient Instructions





- Post Discharge Activity

## 2019-06-29 NOTE — PDOC
*Physical Exam





- Vital Signs


 Last Vital Signs











Temp Pulse Resp BP Pulse Ox


 


 98.4 F   92 H  22 H  144/94   97 


 


 06/29/19 04:32  06/29/19 04:32  06/29/19 04:32  06/29/19 04:25  06/29/19 04:32














ED Treatment Course





- LABORATORY


CBC & Chemistry Diagram: 


 06/29/19 05:46





 06/29/19 05:46





- ADDITIONAL ORDERS


Additional order review: 


 Laboratory  Results











  06/29/19 06/29/19 06/29/19





  05:46 05:46 04:39


 


Sodium  138  


 


Potassium  3.8  


 


Chloride  105  


 


Carbon Dioxide  24  


 


Anion Gap  9  


 


BUN  33.5 H  


 


Creatinine  2.7 H  


 


Est GFR (CKD-EPI)AfAm  27.81  


 


Est GFR (CKD-EPI)NonAf  24.00  


 


POC Glucometer    153


 


Random Glucose  110 H  


 


Calcium  8.9  


 


Magnesium   2.5 H 


 


Total Bilirubin  0.3  


 


AST  24  


 


ALT  21  


 


Alkaline Phosphatase  122 H  


 


Total Protein  7.8  


 


Albumin  4.0  


 


Alcohol, Quantitative  < 3.0  








 











  06/29/19 06/29/19





  05:46 04:39


 


RBC  3.72 L 


 


MCV  90.8 


 


MCHC  34.2 


 


RDW  13.6 


 


MPV  7.4 L D 


 


Neutrophils %  54.5 


 


Lymphocytes %  31.4 


 


Monocytes %  11.4 H 


 


Eosinophils %  2.3 


 


Basophils %  0.4 


 


POC Glucometer   153














Medical Decision Making





- Medical Decision Making





I have assumed care of the patient from Dr. Hebert, who has discussed the 

clinical presentation, work-up, and ED course thus far


06/29/19 07:22





The pt is now ambulating with a stable gait, is oriented x3


Pt w/ acute on chronic CKD


Will give additional 1L IVF


Pt now willing to give urine for UA and UDS


06/29/19 07:22





UDS neg


06/29/19 08:16





Pt does not wish to stay for repeat labs


Pt voices desire to leave AMA





Note:  The patient insists on leaving the emergency dept and is signing out 

against medical advice.


The patient understands the risks and complications that may result from the 

refusal of medical care and admission which includes death and permanent 

disability.  The patient has the mental capacity of understanding the risks of 

refusing care and is capable of making an informed decision.  


The patient was instructed/encouraged to return to the emergency department 

should he change his mind regarding medical care or should his condition worsen.


The patient signed the Against Medical Advice form.


06/29/19 09:08








*DC/Admit/Observation/Transfer


Diagnosis at time of Disposition: 


 EMILIE (acute kidney injury)





Change in mental status


Qualifiers:


 Altered mental status type: unspecified Qualified Code(s): R41.82 - Altered 

mental status, unspecified








- Discharge Dispostion


Disposition: AGAINST MEDICAL ADVICE


Condition at time of disposition: Improved


Decision to Admit order: No





- Referrals





- Patient Instructions


Printed Discharge Instructions:  Acute Renal Failure





- Post Discharge Activity

## 2019-06-29 NOTE — PDOC
Attending Attestation





- Resident


Resident Name: Adriana Hebert





- ED Attending Attestation


I have performed the following: I have examined & evaluated the patient, The 

case was reviewed & discussed with the resident, I agree w/resident's findings 

& plan





- HPI


HPI: 





06/29/19 06:11


Pt is drunk and his blood sugar fell today. Unclear if he took his meds and 

then didn't eat or if all he drank was alcohol.  He is not answering questions.

  Pt is awake and arousable but he is non compliant patient.





- Physicial Exam


PE: 





06/29/19 06:18


Agree with resident exam. Pt has no fever.


Abd soft NT ND.


No flank pain.


Extremities no swelling.


Abd soft NT ND.  No CP and heart RRR.





- Medical Decision Making





06/29/19 06:20


Hydrate with banana bag and check basic labs.
